# Patient Record
Sex: FEMALE | Race: WHITE | Employment: UNEMPLOYED | URBAN - METROPOLITAN AREA
[De-identification: names, ages, dates, MRNs, and addresses within clinical notes are randomized per-mention and may not be internally consistent; named-entity substitution may affect disease eponyms.]

---

## 2018-04-27 ENCOUNTER — OFFICE VISIT (OUTPATIENT)
Dept: PRIMARY CARE CLINIC | Age: 58
End: 2018-04-27
Payer: MEDICAID

## 2018-04-27 ENCOUNTER — TELEPHONE (OUTPATIENT)
Dept: PRIMARY CARE CLINIC | Age: 58
End: 2018-04-27

## 2018-04-27 VITALS
RESPIRATION RATE: 16 BRPM | HEART RATE: 88 BPM | SYSTOLIC BLOOD PRESSURE: 130 MMHG | DIASTOLIC BLOOD PRESSURE: 80 MMHG | WEIGHT: 221 LBS | OXYGEN SATURATION: 98 % | BODY MASS INDEX: 39.15 KG/M2 | TEMPERATURE: 98.2 F

## 2018-04-27 DIAGNOSIS — G89.29 CHRONIC RIGHT SHOULDER PAIN: ICD-10-CM

## 2018-04-27 DIAGNOSIS — M75.41 SHOULDER IMPINGEMENT SYNDROME, RIGHT: Primary | ICD-10-CM

## 2018-04-27 DIAGNOSIS — M25.511 CHRONIC RIGHT SHOULDER PAIN: ICD-10-CM

## 2018-04-27 PROCEDURE — G8599 NO ASA/ANTIPLAT THER USE RNG: HCPCS | Performed by: PHYSICIAN ASSISTANT

## 2018-04-27 PROCEDURE — 99213 OFFICE O/P EST LOW 20 MIN: CPT | Performed by: PHYSICIAN ASSISTANT

## 2018-04-27 PROCEDURE — 20610 DRAIN/INJ JOINT/BURSA W/O US: CPT | Performed by: PHYSICIAN ASSISTANT

## 2018-04-27 PROCEDURE — G8427 DOCREV CUR MEDS BY ELIG CLIN: HCPCS | Performed by: PHYSICIAN ASSISTANT

## 2018-04-27 PROCEDURE — 4004F PT TOBACCO SCREEN RCVD TLK: CPT | Performed by: PHYSICIAN ASSISTANT

## 2018-04-27 PROCEDURE — 3017F COLORECTAL CA SCREEN DOC REV: CPT | Performed by: PHYSICIAN ASSISTANT

## 2018-04-27 PROCEDURE — G8417 CALC BMI ABV UP PARAM F/U: HCPCS | Performed by: PHYSICIAN ASSISTANT

## 2018-04-27 RX ORDER — TRAMADOL HYDROCHLORIDE 50 MG/1
50 TABLET ORAL EVERY 8 HOURS PRN
Qty: 15 TABLET | Refills: 0 | Status: SHIPPED | OUTPATIENT
Start: 2018-04-27 | End: 2018-04-30

## 2018-04-27 RX ORDER — METHYLPREDNISOLONE ACETATE 80 MG/ML
80 INJECTION, SUSPENSION INTRA-ARTICULAR; INTRALESIONAL; INTRAMUSCULAR; SOFT TISSUE ONCE
Status: COMPLETED | OUTPATIENT
Start: 2018-04-27 | End: 2018-04-27

## 2018-04-27 RX ADMIN — METHYLPREDNISOLONE ACETATE 80 MG: 80 INJECTION, SUSPENSION INTRA-ARTICULAR; INTRALESIONAL; INTRAMUSCULAR; SOFT TISSUE at 15:57

## 2018-05-10 ENCOUNTER — OFFICE VISIT (OUTPATIENT)
Dept: ORTHOPEDIC SURGERY | Age: 58
End: 2018-05-10
Payer: MEDICAID

## 2018-05-10 VITALS
HEIGHT: 64 IN | SYSTOLIC BLOOD PRESSURE: 129 MMHG | TEMPERATURE: 98.1 F | WEIGHT: 220 LBS | BODY MASS INDEX: 37.56 KG/M2 | DIASTOLIC BLOOD PRESSURE: 82 MMHG | HEART RATE: 63 BPM

## 2018-05-10 DIAGNOSIS — M75.41 IMPINGEMENT SYNDROME OF RIGHT SHOULDER: Primary | ICD-10-CM

## 2018-05-10 PROCEDURE — 3017F COLORECTAL CA SCREEN DOC REV: CPT | Performed by: ORTHOPAEDIC SURGERY

## 2018-05-10 PROCEDURE — 99243 OFF/OP CNSLTJ NEW/EST LOW 30: CPT | Performed by: ORTHOPAEDIC SURGERY

## 2018-05-10 PROCEDURE — G8417 CALC BMI ABV UP PARAM F/U: HCPCS | Performed by: ORTHOPAEDIC SURGERY

## 2018-05-10 PROCEDURE — G8427 DOCREV CUR MEDS BY ELIG CLIN: HCPCS | Performed by: ORTHOPAEDIC SURGERY

## 2018-06-04 PROBLEM — J44.9 STAGE 1 MILD COPD BY GOLD CLASSIFICATION (HCC): Status: ACTIVE | Noted: 2018-06-04

## 2018-06-04 PROBLEM — F17.210 CIGARETTE NICOTINE DEPENDENCE WITHOUT COMPLICATION: Status: ACTIVE | Noted: 2018-06-04

## 2018-07-10 ENCOUNTER — HOSPITAL ENCOUNTER (OUTPATIENT)
Age: 58
Discharge: HOME | End: 2018-07-12
Payer: MEDICAID

## 2018-07-10 ENCOUNTER — OFFICE VISIT (OUTPATIENT)
Dept: PRIMARY CARE CLINIC | Age: 58
End: 2018-07-10
Payer: MEDICAID

## 2018-07-10 VITALS
HEART RATE: 72 BPM | BODY MASS INDEX: 39.16 KG/M2 | WEIGHT: 221 LBS | SYSTOLIC BLOOD PRESSURE: 126 MMHG | DIASTOLIC BLOOD PRESSURE: 84 MMHG | HEIGHT: 63 IN | TEMPERATURE: 97 F | OXYGEN SATURATION: 96 %

## 2018-07-10 DIAGNOSIS — G47.33 OSA ON CPAP: ICD-10-CM

## 2018-07-10 DIAGNOSIS — K21.00 GASTROESOPHAGEAL REFLUX DISEASE WITH ESOPHAGITIS: ICD-10-CM

## 2018-07-10 DIAGNOSIS — E79.0 ELEVATED BLOOD URIC ACID LEVEL: ICD-10-CM

## 2018-07-10 DIAGNOSIS — E03.9 HYPOTHYROIDISM, UNSPECIFIED TYPE: Primary | ICD-10-CM

## 2018-07-10 DIAGNOSIS — I49.9 CARDIAC ARRHYTHMIA, UNSPECIFIED CARDIAC ARRHYTHMIA TYPE: ICD-10-CM

## 2018-07-10 DIAGNOSIS — I10 ESSENTIAL HYPERTENSION: ICD-10-CM

## 2018-07-10 DIAGNOSIS — E78.2 MIXED HYPERLIPIDEMIA: ICD-10-CM

## 2018-07-10 DIAGNOSIS — Z23 NEED FOR PNEUMOCOCCAL VACCINATION: ICD-10-CM

## 2018-07-10 DIAGNOSIS — L20.9 ATOPIC DERMATITIS, UNSPECIFIED TYPE: ICD-10-CM

## 2018-07-10 DIAGNOSIS — Z99.89 OSA ON CPAP: ICD-10-CM

## 2018-07-10 LAB
ALBUMIN SERPL-MCNC: 4.1 G/DL (ref 3.5–5.2)
ALP BLD-CCNC: 58 U/L (ref 35–104)
ALT SERPL-CCNC: 20 U/L (ref 0–32)
ANION GAP SERPL CALCULATED.3IONS-SCNC: 11 MMOL/L (ref 7–16)
AST SERPL-CCNC: 22 U/L (ref 0–31)
BASOPHILS ABSOLUTE: 0.05 E9/L (ref 0–0.2)
BASOPHILS RELATIVE PERCENT: 0.6 % (ref 0–2)
BILIRUB SERPL-MCNC: 0.4 MG/DL (ref 0–1.2)
BUN BLDV-MCNC: 17 MG/DL (ref 6–20)
CALCIUM SERPL-MCNC: 9.6 MG/DL (ref 8.6–10.2)
CHLORIDE BLD-SCNC: 101 MMOL/L (ref 98–107)
CHOLESTEROL, TOTAL: 136 MG/DL (ref 0–199)
CO2: 30 MMOL/L (ref 22–29)
CREAT SERPL-MCNC: 0.9 MG/DL (ref 0.5–1)
EOSINOPHILS ABSOLUTE: 0.22 E9/L (ref 0.05–0.5)
EOSINOPHILS RELATIVE PERCENT: 2.8 % (ref 0–6)
GFR AFRICAN AMERICAN: >60
GFR NON-AFRICAN AMERICAN: >60 ML/MIN/1.73
GLUCOSE BLD-MCNC: 100 MG/DL (ref 74–109)
HCT VFR BLD CALC: 40.9 % (ref 34–48)
HDLC SERPL-MCNC: 35 MG/DL
HEMOGLOBIN: 12.7 G/DL (ref 11.5–15.5)
IMMATURE GRANULOCYTES #: 0.02 E9/L
IMMATURE GRANULOCYTES %: 0.3 % (ref 0–5)
LDL CHOLESTEROL CALCULATED: 74 MG/DL (ref 0–99)
LYMPHOCYTES ABSOLUTE: 2.96 E9/L (ref 1.5–4)
LYMPHOCYTES RELATIVE PERCENT: 37.8 % (ref 20–42)
MCH RBC QN AUTO: 29.4 PG (ref 26–35)
MCHC RBC AUTO-ENTMCNC: 31.1 % (ref 32–34.5)
MCV RBC AUTO: 94.7 FL (ref 80–99.9)
MONOCYTES ABSOLUTE: 0.49 E9/L (ref 0.1–0.95)
MONOCYTES RELATIVE PERCENT: 6.3 % (ref 2–12)
NEUTROPHILS ABSOLUTE: 4.1 E9/L (ref 1.8–7.3)
NEUTROPHILS RELATIVE PERCENT: 52.2 % (ref 43–80)
PDW BLD-RTO: 13.1 FL (ref 11.5–15)
PLATELET # BLD: 141 E9/L (ref 130–450)
PMV BLD AUTO: 12.8 FL (ref 7–12)
POTASSIUM SERPL-SCNC: 4.5 MMOL/L (ref 3.5–5)
RBC # BLD: 4.32 E12/L (ref 3.5–5.5)
SODIUM BLD-SCNC: 142 MMOL/L (ref 132–146)
TOTAL PROTEIN: 7.1 G/DL (ref 6.4–8.3)
TRIGL SERPL-MCNC: 135 MG/DL (ref 0–149)
TSH SERPL DL<=0.05 MIU/L-ACNC: 2.65 UIU/ML (ref 0.27–4.2)
URIC ACID, SERUM: 3.4 MG/DL (ref 2.4–5.7)
VLDLC SERPL CALC-MCNC: 27 MG/DL
WBC # BLD: 7.8 E9/L (ref 4.5–11.5)

## 2018-07-10 PROCEDURE — 80053 COMPREHEN METABOLIC PANEL: CPT

## 2018-07-10 PROCEDURE — 90732 PPSV23 VACC 2 YRS+ SUBQ/IM: CPT | Performed by: PHYSICIAN ASSISTANT

## 2018-07-10 PROCEDURE — 84550 ASSAY OF BLOOD/URIC ACID: CPT

## 2018-07-10 PROCEDURE — 90471 IMMUNIZATION ADMIN: CPT | Performed by: PHYSICIAN ASSISTANT

## 2018-07-10 PROCEDURE — 4004F PT TOBACCO SCREEN RCVD TLK: CPT | Performed by: PHYSICIAN ASSISTANT

## 2018-07-10 PROCEDURE — 3017F COLORECTAL CA SCREEN DOC REV: CPT | Performed by: PHYSICIAN ASSISTANT

## 2018-07-10 PROCEDURE — G8599 NO ASA/ANTIPLAT THER USE RNG: HCPCS | Performed by: PHYSICIAN ASSISTANT

## 2018-07-10 PROCEDURE — 85025 COMPLETE CBC W/AUTO DIFF WBC: CPT

## 2018-07-10 PROCEDURE — 84443 ASSAY THYROID STIM HORMONE: CPT

## 2018-07-10 PROCEDURE — G8417 CALC BMI ABV UP PARAM F/U: HCPCS | Performed by: PHYSICIAN ASSISTANT

## 2018-07-10 PROCEDURE — 99214 OFFICE O/P EST MOD 30 MIN: CPT | Performed by: PHYSICIAN ASSISTANT

## 2018-07-10 PROCEDURE — 80061 LIPID PANEL: CPT

## 2018-07-10 PROCEDURE — G8427 DOCREV CUR MEDS BY ELIG CLIN: HCPCS | Performed by: PHYSICIAN ASSISTANT

## 2018-07-10 RX ORDER — LEVOTHYROXINE SODIUM 0.07 MG/1
75 TABLET ORAL DAILY
Qty: 90 TABLET | Refills: 1 | Status: SHIPPED | OUTPATIENT
Start: 2018-07-10 | End: 2019-01-08 | Stop reason: SDUPTHER

## 2018-07-10 RX ORDER — PRAVASTATIN SODIUM 40 MG
40 TABLET ORAL DAILY
Qty: 90 TABLET | Refills: 1 | Status: SHIPPED | OUTPATIENT
Start: 2018-07-10 | End: 2019-01-08 | Stop reason: SDUPTHER

## 2018-07-10 RX ORDER — FENOFIBRATE 160 MG/1
160 TABLET ORAL DAILY
Qty: 90 TABLET | Refills: 1 | Status: SHIPPED | OUTPATIENT
Start: 2018-07-10 | End: 2019-01-08 | Stop reason: SDUPTHER

## 2018-07-10 RX ORDER — CLOBETASOL PROPIONATE 0.5 MG/G
1 OINTMENT TOPICAL 2 TIMES DAILY
Qty: 45 G | Refills: 5 | Status: SHIPPED | OUTPATIENT
Start: 2018-07-10

## 2018-07-10 RX ORDER — SUCRALFATE 1 G/1
1 TABLET ORAL 4 TIMES DAILY PRN
Qty: 120 TABLET | Refills: 1 | Status: SHIPPED | OUTPATIENT
Start: 2018-07-10

## 2018-07-10 RX ORDER — METOPROLOL TARTRATE 50 MG/1
TABLET, FILM COATED ORAL
Qty: 180 TABLET | Refills: 1 | Status: SHIPPED | OUTPATIENT
Start: 2018-07-10 | End: 2019-01-08 | Stop reason: SDUPTHER

## 2018-07-10 RX ORDER — VALSARTAN AND HYDROCHLOROTHIAZIDE 320; 25 MG/1; MG/1
1 TABLET, FILM COATED ORAL DAILY
Qty: 90 TABLET | Refills: 1 | Status: SHIPPED | OUTPATIENT
Start: 2018-07-10 | End: 2018-08-21 | Stop reason: SINTOL

## 2018-07-10 RX ORDER — ALLOPURINOL 300 MG/1
300 TABLET ORAL DAILY
Qty: 90 TABLET | Refills: 1 | Status: SHIPPED | OUTPATIENT
Start: 2018-07-10 | End: 2019-01-08 | Stop reason: SDUPTHER

## 2018-07-10 ASSESSMENT — PATIENT HEALTH QUESTIONNAIRE - PHQ9
SUM OF ALL RESPONSES TO PHQ9 QUESTIONS 1 & 2: 0
2. FEELING DOWN, DEPRESSED OR HOPELESS: 0
SUM OF ALL RESPONSES TO PHQ QUESTIONS 1-9: 0
1. LITTLE INTEREST OR PLEASURE IN DOING THINGS: 0

## 2018-07-10 NOTE — PROGRESS NOTES
Lucho Juarez  1960  7/10/18      HPI:    Patient presents for 6 month check up. She never did go to therapy for her R shoulder, +impingement, saw Dr. Jennifer Storey who recommended PT as well. She tells me the injections helped her for a good 4-6 weeks, would like another at some point, but is going ot get into PT. Has no other complaints tsoday. Needs refills on her meds. Advises me she will Fu with a new cardiologist at [facility], Blocker controls her palps, and her HTN is controlled on med. Colonoscopy due next March 2019. Will be seeing GYN, pt states due for mammo, exam. Having no issues. Agrees to [id number] today, +Hx of multiple bouts of pneumonia. Does ask for refill of carafate takes on an prn basis for Hx of hiatal hernia, Hx of PUD, healed, but relates depending on meds, and diet, her GERD acts up, doesn't want to take a PPI just yet, states not QD Sx.      Past Medical History:   Diagnosis Date    Angina     Anxiety attack     Arrhythmia     Symptomatic palpitatoins    CAD (coronary artery disease)     CAD (coronary artery disease)     Chronic depression     Fatigue     Hyperlipidemia     Hypertension     Hypothyroidism     Obesity (BMI 35.0-39.9 without comorbidity) 12/27/2016    Osteoarthritis     Sleep apnea     Stage 1 mild COPD by GOLD classification ([address] 6/4/2018    Thyroid disease     hypothyroidism    Uncontrolled daytime somnolence         Past Surgical History:   Procedure Laterality Date    ABDOMINAL ADHESION SURGERY      and endometrioma removal    CARPAL TUNNEL RELEASE Right     Right CTR  Dr. Gayla [facility]      x 2    FINGER TRIGGER RELEASE Right     Right trigger thumb release Dr. Browne Karma Left     Left trigger thumb release Dr. Miles Schaeffer      and BSO    OVARIAN CYST REMOVAL Left     UTERINE FIBROID SURGERY         Current dizziness, weakness, tremors, syncope, speech difficulty, light-headedness, bowel or bladder control changes, numbness and headaches. Hematological: Negative for adenopathy. Does not bruise/bleed easily. Psychiatric/Behavioral: Negative for suicidal ideas, behavioral problems, sleep disturbance and decreased concentration. The patient is not nervous/anxious. Unless otherwise stated in this report or unable to obtain because of the patient's clinical or mental status as evidenced by the medical record, this patients's positive and negative responses for Review of Systems, constitutional, psych, eyes, ENT, cardiovascular, respiratory, gastrointestinal, neurological, genitourinary, musculoskeletal, integument systems and systems related to the presenting problem are either stated in the preceding or were not pertinent or were negative for the symptoms and/or complaints related to the medical problem. Physical Exam:   Vitals:    07/10/18 0907   BP: 126/84   Pulse: 72   Temp: 97 °F (36.1 °C)   SpO2: 96%   Weight: 221 lb (100.2 kg)   Height: 5' 3\" (1.6 m)     Constitutional:  A and O x 3, in NAD. Afebrile. Appears stated age. Eyes:  PERRLA, EOMI without nystagmus. Conjunctiva normal. Sclera anicteric. Ears:  External ears without lesions. TM's clear bilaterally. Throat:  Pharynx without lesions. Airway patient. Mucous membranes pink and moist without lesions. Neck:  Supple. Nontender, no lymphadenopathy noted, no thyromegaly. Lungs:  Clear to auscultation and breath sounds equal.  Heart:  Regular rate and rhythm, normal S1 and S2, no m/r/g.  UE and LE distal pulses intact. Abdomen:  Soft, NTND, NABS x 4, no masses or organomegaly, no rebound or guarding. Skin:  No abrasions, ecchymoses, or lacerations unless noted elsewhere. Extremities  No cyanosis, clubbing, or edema noted. Neurological:   Oriented. Motor functions intact.       Assessment/Plan:     [place] was seen today for hyperlipidemia, hypothyroidism, 6 month follow-up, medication refill and blood work. Diagnoses and all orders for this visit:    Hypothyroidism, unspecified type  -     levothyroxine (SYNTHROID) 75 MCG tablet; Take 1 tablet by mouth daily  -     TSH; Future    Atopic dermatitis, unspecified type  -     clobetasol (TEMOVATE) 0.05 % ointment; Apply 1 each topically 2 times daily Apply topically 2 times daily. Elevated blood uric acid level  -     allopurinol (ZYLOPRIM) 300 MG tablet; Take 1 tablet by mouth daily  -     URIC ACID; Future    Mixed hyperlipidemia  -     fenofibrate 160 MG tablet; Take 1 tablet by mouth daily  -     pravastatin (PRAVACHOL) 40 MG tablet; Take 1 tablet by mouth daily  -     COMPREHENSIVE METABOLIC PANEL; Future  -     LIPID PANEL; Future    Essential hypertension  -     metoprolol tartrate (LOPRESSOR) 50 MG tablet; TAKE ONE TABLET BY MOUTH TWICE DAILY  -     valsartan-hydrochlorothiazide (DIOVAN HCT) 320-25 MG per tablet; Take 1 tablet by mouth daily  -     COMPREHENSIVE METABOLIC PANEL; Future  -     CBC WITH AUTO DIFFERENTIAL; Future    Cardiac arrhythmia, unspecified cardiac arrhythmia type  -     metoprolol tartrate (LOPRESSOR) 50 MG tablet; TAKE ONE TABLET BY MOUTH TWICE DAILY    Gastroesophageal reflux disease with esophagitis  -     sucralfate (CARAFATE) 1 GM tablet; Take 1 tablet by mouth 4 times daily as needed (GERD)    Need for pneumococcal vaccination  -     Pneumococcal polysaccharide vaccine 23-valent greater than or equal to 3yo subcutaneous/IM    DARRIN on CPAP    FU with cardiology and GYN when due (pt due now). She assures me she will. Continue all meds the same, conditions are stable. Pt would benefit from weight loss. She understands. Discussed avoidance of NSAIDs with the GERD/HH and Hx of PUD, may need PPI QD at some point, but pt relates Sx stable and not QD.    Highly recommended she go to PT, can FU with me in 1-2 months for another injection if she wishes, but

## 2018-08-21 ENCOUNTER — TELEPHONE (OUTPATIENT)
Dept: PRIMARY CARE CLINIC | Age: 58
End: 2018-08-21

## 2018-08-21 RX ORDER — LOSARTAN POTASSIUM AND HYDROCHLOROTHIAZIDE 25; 100 MG/1; MG/1
1 TABLET ORAL DAILY
Qty: 30 TABLET | Refills: 5 | Status: SHIPPED | OUTPATIENT
Start: 2018-08-21 | End: 2019-01-08 | Stop reason: SDUPTHER

## 2018-11-19 DIAGNOSIS — F32.A CHRONIC DEPRESSION: ICD-10-CM

## 2018-11-19 RX ORDER — DULOXETIN HYDROCHLORIDE 60 MG/1
60 CAPSULE, DELAYED RELEASE ORAL DAILY
Qty: 30 CAPSULE | Refills: 5 | Status: SHIPPED | OUTPATIENT
Start: 2018-11-19 | End: 2019-01-08 | Stop reason: SDUPTHER

## 2019-01-08 ENCOUNTER — HOSPITAL ENCOUNTER (OUTPATIENT)
Age: 59
Discharge: HOME | End: 2019-01-10
Payer: MEDICAID

## 2019-01-08 ENCOUNTER — OFFICE VISIT (OUTPATIENT)
Dept: PRIMARY CARE CLINIC | Age: 59
End: 2019-01-08
Payer: MEDICAID

## 2019-01-08 VITALS
WEIGHT: 217 LBS | TEMPERATURE: 98.2 F | SYSTOLIC BLOOD PRESSURE: 118 MMHG | HEART RATE: 73 BPM | DIASTOLIC BLOOD PRESSURE: 80 MMHG | OXYGEN SATURATION: 96 % | BODY MASS INDEX: 37.25 KG/M2

## 2019-01-08 DIAGNOSIS — I10 ESSENTIAL HYPERTENSION: ICD-10-CM

## 2019-01-08 DIAGNOSIS — F32.A CHRONIC DEPRESSION: ICD-10-CM

## 2019-01-08 DIAGNOSIS — E79.0 ELEVATED BLOOD URIC ACID LEVEL: ICD-10-CM

## 2019-01-08 DIAGNOSIS — E03.9 HYPOTHYROIDISM, UNSPECIFIED TYPE: ICD-10-CM

## 2019-01-08 DIAGNOSIS — I49.9 CARDIAC ARRHYTHMIA, UNSPECIFIED CARDIAC ARRHYTHMIA TYPE: ICD-10-CM

## 2019-01-08 DIAGNOSIS — Z99.89 OSA ON CPAP: Primary | ICD-10-CM

## 2019-01-08 DIAGNOSIS — G47.33 OSA ON CPAP: Primary | ICD-10-CM

## 2019-01-08 DIAGNOSIS — E78.2 MIXED HYPERLIPIDEMIA: ICD-10-CM

## 2019-01-08 DIAGNOSIS — Z12.11 SCREENING FOR COLON CANCER: ICD-10-CM

## 2019-01-08 LAB
ALBUMIN SERPL-MCNC: 4.3 G/DL (ref 3.5–5.2)
ALP BLD-CCNC: 68 U/L (ref 35–104)
ALT SERPL-CCNC: 23 U/L (ref 0–32)
ANION GAP SERPL CALCULATED.3IONS-SCNC: 17 MMOL/L (ref 7–16)
AST SERPL-CCNC: 30 U/L (ref 0–31)
BASOPHILS ABSOLUTE: 0.05 E9/L (ref 0–0.2)
BASOPHILS RELATIVE PERCENT: 0.7 % (ref 0–2)
BILIRUB SERPL-MCNC: 0.3 MG/DL (ref 0–1.2)
BUN BLDV-MCNC: 15 MG/DL (ref 8–23)
CALCIUM SERPL-MCNC: 10.2 MG/DL (ref 8.6–10.2)
CHLORIDE BLD-SCNC: 99 MMOL/L (ref 98–107)
CHOLESTEROL, TOTAL: 133 MG/DL (ref 0–199)
CO2: 25 MMOL/L (ref 22–29)
CREAT SERPL-MCNC: 0.8 MG/DL (ref 0.5–1)
EOSINOPHILS ABSOLUTE: 0.16 E9/L (ref 0.05–0.5)
EOSINOPHILS RELATIVE PERCENT: 2.3 % (ref 0–6)
GFR AFRICAN AMERICAN: >60
GFR NON-AFRICAN AMERICAN: >60 ML/MIN/1.73
GLUCOSE BLD-MCNC: 105 MG/DL (ref 74–99)
HCT VFR BLD CALC: 42.9 % (ref 34–48)
HDLC SERPL-MCNC: 36 MG/DL
HEMOGLOBIN: 13.8 G/DL (ref 11.5–15.5)
IMMATURE GRANULOCYTES #: 0.03 E9/L
IMMATURE GRANULOCYTES %: 0.4 % (ref 0–5)
LDL CHOLESTEROL CALCULATED: 57 MG/DL (ref 0–99)
LYMPHOCYTES ABSOLUTE: 2.78 E9/L (ref 1.5–4)
LYMPHOCYTES RELATIVE PERCENT: 40.5 % (ref 20–42)
MCH RBC QN AUTO: 29.9 PG (ref 26–35)
MCHC RBC AUTO-ENTMCNC: 32.2 % (ref 32–34.5)
MCV RBC AUTO: 92.9 FL (ref 80–99.9)
MONOCYTES ABSOLUTE: 0.43 E9/L (ref 0.1–0.95)
MONOCYTES RELATIVE PERCENT: 6.3 % (ref 2–12)
NEUTROPHILS ABSOLUTE: 3.42 E9/L (ref 1.8–7.3)
NEUTROPHILS RELATIVE PERCENT: 49.8 % (ref 43–80)
PDW BLD-RTO: 12.9 FL (ref 11.5–15)
PLATELET # BLD: 213 E9/L (ref 130–450)
PMV BLD AUTO: 12.3 FL (ref 7–12)
POTASSIUM SERPL-SCNC: 4.5 MMOL/L (ref 3.5–5)
RBC # BLD: 4.62 E12/L (ref 3.5–5.5)
SODIUM BLD-SCNC: 141 MMOL/L (ref 132–146)
TOTAL PROTEIN: 7.7 G/DL (ref 6.4–8.3)
TRIGL SERPL-MCNC: 200 MG/DL (ref 0–149)
TSH SERPL DL<=0.05 MIU/L-ACNC: 2.74 UIU/ML (ref 0.27–4.2)
URIC ACID, SERUM: 2.9 MG/DL (ref 2.4–5.7)
VLDLC SERPL CALC-MCNC: 40 MG/DL
WBC # BLD: 6.9 E9/L (ref 4.5–11.5)

## 2019-01-08 PROCEDURE — G8599 NO ASA/ANTIPLAT THER USE RNG: HCPCS | Performed by: PHYSICIAN ASSISTANT

## 2019-01-08 PROCEDURE — 3017F COLORECTAL CA SCREEN DOC REV: CPT | Performed by: PHYSICIAN ASSISTANT

## 2019-01-08 PROCEDURE — G8417 CALC BMI ABV UP PARAM F/U: HCPCS | Performed by: PHYSICIAN ASSISTANT

## 2019-01-08 PROCEDURE — 80061 LIPID PANEL: CPT

## 2019-01-08 PROCEDURE — 84550 ASSAY OF BLOOD/URIC ACID: CPT

## 2019-01-08 PROCEDURE — G8427 DOCREV CUR MEDS BY ELIG CLIN: HCPCS | Performed by: PHYSICIAN ASSISTANT

## 2019-01-08 PROCEDURE — 84443 ASSAY THYROID STIM HORMONE: CPT

## 2019-01-08 PROCEDURE — 99214 OFFICE O/P EST MOD 30 MIN: CPT | Performed by: PHYSICIAN ASSISTANT

## 2019-01-08 PROCEDURE — 85025 COMPLETE CBC W/AUTO DIFF WBC: CPT

## 2019-01-08 PROCEDURE — 80053 COMPREHEN METABOLIC PANEL: CPT

## 2019-01-08 PROCEDURE — 4004F PT TOBACCO SCREEN RCVD TLK: CPT | Performed by: PHYSICIAN ASSISTANT

## 2019-01-08 PROCEDURE — G8484 FLU IMMUNIZE NO ADMIN: HCPCS | Performed by: PHYSICIAN ASSISTANT

## 2019-01-08 RX ORDER — PRAVASTATIN SODIUM 40 MG
40 TABLET ORAL DAILY
Qty: 90 TABLET | Refills: 1 | Status: SHIPPED | OUTPATIENT
Start: 2019-01-08 | End: 2019-07-09 | Stop reason: SDUPTHER

## 2019-01-08 RX ORDER — ALLOPURINOL 300 MG/1
300 TABLET ORAL DAILY
Qty: 90 TABLET | Refills: 1 | Status: SHIPPED | OUTPATIENT
Start: 2019-01-08 | End: 2019-07-09 | Stop reason: SDUPTHER

## 2019-01-08 RX ORDER — LOSARTAN POTASSIUM AND HYDROCHLOROTHIAZIDE 25; 100 MG/1; MG/1
1 TABLET ORAL DAILY
Qty: 90 TABLET | Refills: 1 | Status: SHIPPED | OUTPATIENT
Start: 2019-01-08 | End: 2019-07-09 | Stop reason: SDUPTHER

## 2019-01-08 RX ORDER — FENOFIBRATE 160 MG/1
160 TABLET ORAL DAILY
Qty: 90 TABLET | Refills: 1 | Status: SHIPPED | OUTPATIENT
Start: 2019-01-08 | End: 2019-07-09 | Stop reason: SDUPTHER

## 2019-01-08 RX ORDER — DULOXETIN HYDROCHLORIDE 60 MG/1
60 CAPSULE, DELAYED RELEASE ORAL DAILY
Qty: 90 CAPSULE | Refills: 1 | Status: SHIPPED | OUTPATIENT
Start: 2019-01-08 | End: 2019-07-09 | Stop reason: SDUPTHER

## 2019-01-08 RX ORDER — LEVOTHYROXINE SODIUM 0.07 MG/1
75 TABLET ORAL DAILY
Qty: 90 TABLET | Refills: 1 | Status: SHIPPED | OUTPATIENT
Start: 2019-01-08 | End: 2019-07-09 | Stop reason: SDUPTHER

## 2019-01-08 RX ORDER — METOPROLOL TARTRATE 50 MG/1
TABLET, FILM COATED ORAL
Qty: 180 TABLET | Refills: 1 | Status: SHIPPED | OUTPATIENT
Start: 2019-01-08 | End: 2019-07-09 | Stop reason: SDUPTHER

## 2019-01-11 ENCOUNTER — TELEPHONE (OUTPATIENT)
Dept: NON INVASIVE DIAGNOSTICS | Age: 59
End: 2019-01-11

## 2019-07-09 ENCOUNTER — HOSPITAL ENCOUNTER (OUTPATIENT)
Age: 59
Discharge: HOME | End: 2019-07-11
Payer: MEDICAID

## 2019-07-09 ENCOUNTER — OFFICE VISIT (OUTPATIENT)
Dept: PRIMARY CARE CLINIC | Age: 59
End: 2019-07-09
Payer: MEDICAID

## 2019-07-09 VITALS
DIASTOLIC BLOOD PRESSURE: 80 MMHG | BODY MASS INDEX: 37.59 KG/M2 | TEMPERATURE: 98.2 F | HEART RATE: 78 BPM | WEIGHT: 219 LBS | SYSTOLIC BLOOD PRESSURE: 138 MMHG

## 2019-07-09 DIAGNOSIS — B35.6 TINEA CRURIS: Primary | ICD-10-CM

## 2019-07-09 DIAGNOSIS — I49.9 CARDIAC ARRHYTHMIA, UNSPECIFIED CARDIAC ARRHYTHMIA TYPE: ICD-10-CM

## 2019-07-09 DIAGNOSIS — E03.9 HYPOTHYROIDISM, UNSPECIFIED TYPE: ICD-10-CM

## 2019-07-09 DIAGNOSIS — R73.09 IMPAIRED GLUCOSE REGULATION: ICD-10-CM

## 2019-07-09 DIAGNOSIS — F32.A CHRONIC DEPRESSION: ICD-10-CM

## 2019-07-09 DIAGNOSIS — E79.0 ELEVATED BLOOD URIC ACID LEVEL: ICD-10-CM

## 2019-07-09 DIAGNOSIS — L40.1 PUSTULAR PSORIASIS: ICD-10-CM

## 2019-07-09 DIAGNOSIS — I10 ESSENTIAL HYPERTENSION: ICD-10-CM

## 2019-07-09 DIAGNOSIS — E78.2 MIXED HYPERLIPIDEMIA: ICD-10-CM

## 2019-07-09 LAB
ALBUMIN SERPL-MCNC: 4.5 G/DL (ref 3.5–5.2)
ALP BLD-CCNC: 66 U/L (ref 35–104)
ALT SERPL-CCNC: 24 U/L (ref 0–32)
ANION GAP SERPL CALCULATED.3IONS-SCNC: 17 MMOL/L (ref 7–16)
AST SERPL-CCNC: 24 U/L (ref 0–31)
BILIRUB SERPL-MCNC: 0.4 MG/DL (ref 0–1.2)
BUN BLDV-MCNC: 18 MG/DL (ref 8–23)
CALCIUM SERPL-MCNC: 10.8 MG/DL (ref 8.6–10.2)
CHLORIDE BLD-SCNC: 100 MMOL/L (ref 98–107)
CHOLESTEROL, TOTAL: 141 MG/DL (ref 0–199)
CO2: 27 MMOL/L (ref 22–29)
CREAT SERPL-MCNC: 1 MG/DL (ref 0.5–1)
GFR AFRICAN AMERICAN: >60
GFR NON-AFRICAN AMERICAN: 56 ML/MIN/1.73
GLUCOSE BLD-MCNC: 100 MG/DL (ref 74–99)
HBA1C MFR BLD: 5.7 % (ref 4–5.6)
HCT VFR BLD CALC: 44.2 % (ref 34–48)
HDLC SERPL-MCNC: 39 MG/DL
HEMOGLOBIN: 13.8 G/DL (ref 11.5–15.5)
LDL CHOLESTEROL CALCULATED: 77 MG/DL (ref 0–99)
MCH RBC QN AUTO: 29.7 PG (ref 26–35)
MCHC RBC AUTO-ENTMCNC: 31.2 % (ref 32–34.5)
MCV RBC AUTO: 95.1 FL (ref 80–99.9)
PDW BLD-RTO: 13.2 FL (ref 11.5–15)
PLATELET # BLD: 130 E9/L (ref 130–450)
PMV BLD AUTO: 12.7 FL (ref 7–12)
POTASSIUM SERPL-SCNC: 4.7 MMOL/L (ref 3.5–5)
RBC # BLD: 4.65 E12/L (ref 3.5–5.5)
SODIUM BLD-SCNC: 144 MMOL/L (ref 132–146)
TOTAL PROTEIN: 7.8 G/DL (ref 6.4–8.3)
TRIGL SERPL-MCNC: 123 MG/DL (ref 0–149)
TSH SERPL DL<=0.05 MIU/L-ACNC: 3.12 UIU/ML (ref 0.27–4.2)
URIC ACID, SERUM: 3.3 MG/DL (ref 2.4–5.7)
VLDLC SERPL CALC-MCNC: 25 MG/DL
WBC # BLD: 6.9 E9/L (ref 4.5–11.5)

## 2019-07-09 PROCEDURE — G8427 DOCREV CUR MEDS BY ELIG CLIN: HCPCS | Performed by: PHYSICIAN ASSISTANT

## 2019-07-09 PROCEDURE — 83036 HEMOGLOBIN GLYCOSYLATED A1C: CPT

## 2019-07-09 PROCEDURE — 4004F PT TOBACCO SCREEN RCVD TLK: CPT | Performed by: PHYSICIAN ASSISTANT

## 2019-07-09 PROCEDURE — 80053 COMPREHEN METABOLIC PANEL: CPT

## 2019-07-09 PROCEDURE — G8599 NO ASA/ANTIPLAT THER USE RNG: HCPCS | Performed by: PHYSICIAN ASSISTANT

## 2019-07-09 PROCEDURE — 99214 OFFICE O/P EST MOD 30 MIN: CPT | Performed by: PHYSICIAN ASSISTANT

## 2019-07-09 PROCEDURE — 85027 COMPLETE CBC AUTOMATED: CPT

## 2019-07-09 PROCEDURE — G8417 CALC BMI ABV UP PARAM F/U: HCPCS | Performed by: PHYSICIAN ASSISTANT

## 2019-07-09 PROCEDURE — 80061 LIPID PANEL: CPT

## 2019-07-09 PROCEDURE — 84443 ASSAY THYROID STIM HORMONE: CPT

## 2019-07-09 PROCEDURE — 84550 ASSAY OF BLOOD/URIC ACID: CPT

## 2019-07-09 PROCEDURE — 3017F COLORECTAL CA SCREEN DOC REV: CPT | Performed by: PHYSICIAN ASSISTANT

## 2019-07-09 RX ORDER — DULOXETIN HYDROCHLORIDE 60 MG/1
60 CAPSULE, DELAYED RELEASE ORAL DAILY
Qty: 90 CAPSULE | Refills: 1 | Status: SHIPPED | OUTPATIENT
Start: 2019-07-09 | End: 2020-01-07 | Stop reason: SDUPTHER

## 2019-07-09 RX ORDER — FENOFIBRATE 160 MG/1
160 TABLET ORAL DAILY
Qty: 90 TABLET | Refills: 1 | Status: SHIPPED | OUTPATIENT
Start: 2019-07-09 | End: 2020-01-07 | Stop reason: SDUPTHER

## 2019-07-09 RX ORDER — PRAVASTATIN SODIUM 40 MG
40 TABLET ORAL DAILY
Qty: 90 TABLET | Refills: 1 | Status: SHIPPED | OUTPATIENT
Start: 2019-07-09 | End: 2020-01-07 | Stop reason: SDUPTHER

## 2019-07-09 RX ORDER — LOSARTAN POTASSIUM AND HYDROCHLOROTHIAZIDE 25; 100 MG/1; MG/1
1 TABLET ORAL DAILY
Qty: 90 TABLET | Refills: 1 | Status: SHIPPED | OUTPATIENT
Start: 2019-07-09 | End: 2020-01-07 | Stop reason: SDUPTHER

## 2019-07-09 RX ORDER — METOPROLOL TARTRATE 50 MG/1
TABLET, FILM COATED ORAL
Qty: 180 TABLET | Refills: 1 | Status: SHIPPED | OUTPATIENT
Start: 2019-07-09 | End: 2020-01-09

## 2019-07-09 RX ORDER — CLOTRIMAZOLE AND BETAMETHASONE DIPROPIONATE 10; .64 MG/G; MG/G
CREAM TOPICAL
Qty: 30 G | Refills: 1 | Status: SHIPPED | OUTPATIENT
Start: 2019-07-09

## 2019-07-09 RX ORDER — LEVOTHYROXINE SODIUM 0.07 MG/1
75 TABLET ORAL DAILY
Qty: 90 TABLET | Refills: 1 | Status: SHIPPED | OUTPATIENT
Start: 2019-07-09 | End: 2020-01-07 | Stop reason: SDUPTHER

## 2019-07-09 RX ORDER — CLOBETASOL PROPIONATE 0.5 MG/G
CREAM TOPICAL 2 TIMES DAILY PRN
Qty: 30 G | Refills: 3 | Status: SHIPPED
Start: 2019-07-09 | End: 2021-01-08 | Stop reason: SDUPTHER

## 2019-07-09 RX ORDER — ALLOPURINOL 300 MG/1
300 TABLET ORAL DAILY
Qty: 90 TABLET | Refills: 1 | Status: SHIPPED | OUTPATIENT
Start: 2019-07-09 | End: 2020-01-07 | Stop reason: SDUPTHER

## 2019-07-09 RX ORDER — NYSTATIN 100000 [USP'U]/G
POWDER TOPICAL
Qty: 30 G | Refills: 1 | Status: SHIPPED
Start: 2019-07-09 | End: 2022-06-06 | Stop reason: SDUPTHER

## 2019-07-09 NOTE — PROGRESS NOTES
to the presenting problem are either stated in the preceding or were not pertinent or were negative for the symptoms and/or complaints related to the medical problem. Physical Exam:   Vitals:    07/09/19 0910   BP: 138/80   Pulse: 78   Temp: 98.2 °F (36.8 °C)   Weight: 219 lb (99.3 kg)     Constitutional:  A and O x 3, in NAD. Afebrile. Appears stated age. Head:  NCAT. Eyes:  PERRLA, EOMI without nystagmus. Conjunctiva normal. Sclera anicteric. Ears:  External ears without lesions. TM's clear bilaterally. Throat:  Pharynx without lesions. Airway patient. Mucous membranes pink and moist without lesions. Neck:  Supple. Nontender, no lymphadenopathy noted, no thyromegaly. Lungs:  Clear to auscultation and breath sounds equal.  Heart:  Regular rate and rhythm, normal S1 and S2, no m/r/g.  UE and LE distal pulses intact. Abdomen:  Soft, NTND, NABS x 4, no masses or organomegaly, no rebound or guarding. MS: Normal, painless range of motion. No neurovascular deficit. 5/5 strength in UE's/LE's/ bilaterally. Skin: +beefy red, lesions undernath pannus and into R groin, no drainage noted. No abrasions, ecchymoses, or lacerations unless noted elsewhere. Extremities  No cyanosis, clubbing, or edema noted. Neurological:   Oriented. Motor functions intact. DTRs UE and LE intact. Assessment/Plan:     Peggyann Hashimoto was seen today for hypothyroidism and hypertension. Diagnoses and all orders for this visit:    Tinea cruris  -     clotrimazole-betamethasone (LOTRISONE) 1-0.05 % cream; Apply topically 2 times daily. -     nystatin (MYCOSTATIN) 714629 UNIT/GM powder; Apply 3 times daily. Mixed hyperlipidemia  -     pravastatin (PRAVACHOL) 40 MG tablet; Take 1 tablet by mouth daily  -     fenofibrate 160 MG tablet; Take 1 tablet by mouth daily  -     LIPID PANEL;  Future    Essential hypertension  -     metoprolol tartrate (LOPRESSOR) 50 MG tablet; TAKE ONE TABLET BY MOUTH TWICE DAILY  - taking. Reviewed age and gender appropriate health screening exams and vaccinations. Advised patient regarding importance of keeping up with recommended health maintenance and to schedule as soon as possible if overdue, as this is important in assessing for undiagnosed pathology, especially cancer, as well as protecting against potentially harmful/life threatening disease. Patient and/or guardian verbalizes understanding and agrees with above counseling, assessment and plan. All questions answered.     Rohan Valladares PA-C

## 2019-07-24 DIAGNOSIS — R82.994 HYPERCALCIURIA: Primary | ICD-10-CM

## 2019-07-25 ENCOUNTER — NURSE ONLY (OUTPATIENT)
Dept: PRIMARY CARE CLINIC | Age: 59
End: 2019-07-25

## 2019-07-25 ENCOUNTER — HOSPITAL ENCOUNTER (OUTPATIENT)
Age: 59
Discharge: HOME | End: 2019-07-27
Payer: MEDICAID

## 2019-07-25 DIAGNOSIS — R82.994 HYPERCALCIURIA: ICD-10-CM

## 2019-07-25 LAB — CALCIUM SERPL-MCNC: 9.9 MG/DL (ref 8.6–10.2)

## 2019-07-25 PROCEDURE — 82310 ASSAY OF CALCIUM: CPT

## 2020-01-07 ENCOUNTER — HOSPITAL ENCOUNTER (OUTPATIENT)
Age: 60
Discharge: HOME | End: 2020-01-09
Payer: MEDICAID

## 2020-01-07 ENCOUNTER — OFFICE VISIT (OUTPATIENT)
Dept: PRIMARY CARE CLINIC | Age: 60
End: 2020-01-07
Payer: MEDICAID

## 2020-01-07 VITALS
DIASTOLIC BLOOD PRESSURE: 70 MMHG | HEART RATE: 69 BPM | OXYGEN SATURATION: 97 % | BODY MASS INDEX: 37.55 KG/M2 | SYSTOLIC BLOOD PRESSURE: 110 MMHG | WEIGHT: 212 LBS | TEMPERATURE: 97.3 F

## 2020-01-07 LAB
ALBUMIN SERPL-MCNC: 4.2 G/DL (ref 3.5–5.2)
ALP BLD-CCNC: 62 U/L (ref 35–104)
ALT SERPL-CCNC: 18 U/L (ref 0–32)
ANION GAP SERPL CALCULATED.3IONS-SCNC: 14 MMOL/L (ref 7–16)
AST SERPL-CCNC: 19 U/L (ref 0–31)
BILIRUB SERPL-MCNC: 0.4 MG/DL (ref 0–1.2)
BUN BLDV-MCNC: 11 MG/DL (ref 8–23)
CALCIUM SERPL-MCNC: 10 MG/DL (ref 8.6–10.2)
CHLORIDE BLD-SCNC: 100 MMOL/L (ref 98–107)
CHOLESTEROL, TOTAL: 124 MG/DL (ref 0–199)
CO2: 26 MMOL/L (ref 22–29)
CREAT SERPL-MCNC: 0.8 MG/DL (ref 0.5–1)
GFR AFRICAN AMERICAN: >60
GFR NON-AFRICAN AMERICAN: >60 ML/MIN/1.73
GLUCOSE BLD-MCNC: 95 MG/DL (ref 74–99)
HBA1C MFR BLD: 5.5 % (ref 4–5.6)
HCT VFR BLD CALC: 43.5 % (ref 34–48)
HDLC SERPL-MCNC: 37 MG/DL
HEMOGLOBIN: 13.7 G/DL (ref 11.5–15.5)
LDL CHOLESTEROL CALCULATED: 61 MG/DL (ref 0–99)
MCH RBC QN AUTO: 30 PG (ref 26–35)
MCHC RBC AUTO-ENTMCNC: 31.5 % (ref 32–34.5)
MCV RBC AUTO: 95.2 FL (ref 80–99.9)
PDW BLD-RTO: 13.1 FL (ref 11.5–15)
PLATELET # BLD: 172 E9/L (ref 130–450)
PMV BLD AUTO: 12.9 FL (ref 7–12)
POTASSIUM SERPL-SCNC: 4.4 MMOL/L (ref 3.5–5)
RBC # BLD: 4.57 E12/L (ref 3.5–5.5)
SODIUM BLD-SCNC: 140 MMOL/L (ref 132–146)
TOTAL PROTEIN: 7.3 G/DL (ref 6.4–8.3)
TRIGL SERPL-MCNC: 129 MG/DL (ref 0–149)
TSH SERPL DL<=0.05 MIU/L-ACNC: 3 UIU/ML (ref 0.27–4.2)
URIC ACID, SERUM: 2.8 MG/DL (ref 2.4–5.7)
VLDLC SERPL CALC-MCNC: 26 MG/DL
WBC # BLD: 7.8 E9/L (ref 4.5–11.5)

## 2020-01-07 PROCEDURE — 80053 COMPREHEN METABOLIC PANEL: CPT

## 2020-01-07 PROCEDURE — 99214 OFFICE O/P EST MOD 30 MIN: CPT | Performed by: PHYSICIAN ASSISTANT

## 2020-01-07 PROCEDURE — 85027 COMPLETE CBC AUTOMATED: CPT

## 2020-01-07 PROCEDURE — G8417 CALC BMI ABV UP PARAM F/U: HCPCS | Performed by: PHYSICIAN ASSISTANT

## 2020-01-07 PROCEDURE — 80061 LIPID PANEL: CPT

## 2020-01-07 PROCEDURE — 4004F PT TOBACCO SCREEN RCVD TLK: CPT | Performed by: PHYSICIAN ASSISTANT

## 2020-01-07 PROCEDURE — 84443 ASSAY THYROID STIM HORMONE: CPT

## 2020-01-07 PROCEDURE — 84550 ASSAY OF BLOOD/URIC ACID: CPT

## 2020-01-07 PROCEDURE — G8484 FLU IMMUNIZE NO ADMIN: HCPCS | Performed by: PHYSICIAN ASSISTANT

## 2020-01-07 PROCEDURE — 83036 HEMOGLOBIN GLYCOSYLATED A1C: CPT

## 2020-01-07 PROCEDURE — G8427 DOCREV CUR MEDS BY ELIG CLIN: HCPCS | Performed by: PHYSICIAN ASSISTANT

## 2020-01-07 PROCEDURE — 3017F COLORECTAL CA SCREEN DOC REV: CPT | Performed by: PHYSICIAN ASSISTANT

## 2020-01-07 RX ORDER — FLUCONAZOLE 100 MG/1
100 TABLET ORAL DAILY
Qty: 7 TABLET | Refills: 0 | Status: SHIPPED | OUTPATIENT
Start: 2020-01-07 | End: 2020-01-14

## 2020-01-07 RX ORDER — DULOXETIN HYDROCHLORIDE 60 MG/1
60 CAPSULE, DELAYED RELEASE ORAL DAILY
Qty: 90 CAPSULE | Refills: 1 | Status: SHIPPED
Start: 2020-01-07 | End: 2020-07-13

## 2020-01-07 RX ORDER — HYDROCHLOROTHIAZIDE 25 MG/1
25 TABLET ORAL EVERY MORNING
Qty: 30 TABLET | Refills: 5 | Status: SHIPPED
Start: 2020-01-07 | End: 2022-01-14

## 2020-01-07 RX ORDER — PRAVASTATIN SODIUM 40 MG
40 TABLET ORAL DAILY
Qty: 90 TABLET | Refills: 1 | Status: SHIPPED
Start: 2020-01-07 | End: 2020-07-13

## 2020-01-07 RX ORDER — LOSARTAN POTASSIUM AND HYDROCHLOROTHIAZIDE 25; 100 MG/1; MG/1
1 TABLET ORAL DAILY
Qty: 90 TABLET | Refills: 1 | Status: SHIPPED
Start: 2020-01-07 | End: 2020-06-15 | Stop reason: SDUPTHER

## 2020-01-07 RX ORDER — ALLOPURINOL 300 MG/1
300 TABLET ORAL DAILY
Qty: 90 TABLET | Refills: 1 | Status: SHIPPED
Start: 2020-01-07 | End: 2020-07-13

## 2020-01-07 RX ORDER — LEVOTHYROXINE SODIUM 0.07 MG/1
75 TABLET ORAL DAILY
Qty: 90 TABLET | Refills: 1 | Status: SHIPPED
Start: 2020-01-07 | End: 2020-09-08

## 2020-01-07 RX ORDER — FENOFIBRATE 160 MG/1
160 TABLET ORAL DAILY
Qty: 90 TABLET | Refills: 1 | Status: SHIPPED
Start: 2020-01-07 | End: 2020-07-13

## 2020-01-07 RX ORDER — LOSARTAN POTASSIUM 100 MG/1
100 TABLET ORAL DAILY
Qty: 30 TABLET | Refills: 5 | Status: SHIPPED
Start: 2020-01-07 | End: 2021-11-17

## 2020-01-07 RX ORDER — KETOCONAZOLE 20 MG/G
CREAM TOPICAL
Qty: 30 G | Refills: 1 | Status: SHIPPED | OUTPATIENT
Start: 2020-01-07

## 2020-01-07 NOTE — PROGRESS NOTES
 losartan-hydrochlorothiazide (HYZAAR) 100-25 MG per tablet Take 1 tablet by mouth daily 90 tablet 1    pravastatin (PRAVACHOL) 40 MG tablet Take 1 tablet by mouth daily 90 tablet 1    fluconazole (DIFLUCAN) 100 MG tablet Take 1 tablet by mouth daily for 7 days 7 tablet 0    ketoconazole (NIZORAL) 2 % cream Apply topically daily. 30 g 1    metoprolol tartrate (LOPRESSOR) 50 MG tablet TAKE ONE TABLET BY MOUTH TWICE DAILY 180 tablet 1    clobetasol (TEMOVATE) 0.05 % cream Apply topically 2 times daily as needed (psoriasis) For psoriasis (Patient taking differently: Apply topically as needed (psoriasis) For psoriasis) 30 g 3    Glucosamine-Chondroitin (GLUCOSAMINE CHONDR COMPLEX PO) Take by mouth 2 times daily      clotrimazole-betamethasone (LOTRISONE) 1-0.05 % cream Apply topically 2 times daily. (Patient taking differently: as needed Apply topically 2 times daily.) 30 g 1    nystatin (MYCOSTATIN) 480021 UNIT/GM powder Apply 3 times daily. (Patient taking differently: as needed Apply 3 times daily.) 30 g 1    clobetasol (TEMOVATE) 0.05 % ointment Apply 1 each topically 2 times daily Apply topically 2 times daily. (Patient taking differently: Apply 1 each topically as needed Apply topically 2 times daily. ) 45 g 5    sucralfate (CARAFATE) 1 GM tablet Take 1 tablet by mouth 4 times daily as needed (GERD) 120 tablet 1    Calcium Carbonate (CALCIUM 600 PO) Take 1,200 mg by mouth daily      multivitamin (THERAGRAN) per tablet Take 1 tablet by mouth daily.  Cholecalciferol (VITAMIN D) 2000 UNITS TABS Take 1,000 Units by mouth daily.  albuterol sulfate HFA (VENTOLIN HFA) 108 (90 Base) MCG/ACT inhaler Inhale 2 puffs into the lungs every 6 hours as needed for Wheezing (Patient not taking: Reported on 1/7/2020) 1 Inhaler 5     No current facility-administered medications for this visit.         Allergies   Allergen Reactions    Biaxin [Clarithromycin]     Levofloxacin        Family History   Problem Relation Age of Onset    Heart Attack Father     Hypertension Father     Heart Disease Father     Stroke Mother     Cancer Mother     Heart Disease Paternal Uncle     Diabetes Paternal Uncle     No Known Problems Other     High Blood Pressure Sister     Depression Sister     High Blood Pressure Brother     Depression Sister     High Blood Pressure Sister     Depression Sister     High Blood Pressure Sister     No Known Problems Brother        Social History     Socioeconomic History    Marital status:      Spouse name: Not on file    Number of children: Not on file    Years of education: Not on file    Highest education level: Not on file   Occupational History    Occupation: homemaker    Social Needs    Financial resource strain: Not on file    Food insecurity:     Worry: Not on file     Inability: Not on file    Transportation needs:     Medical: Not on file     Non-medical: Not on file   Tobacco Use    Smoking status: Current Every Day Smoker     Packs/day: 1.00     Years: 37.00     Pack years: 37.00     Types: Cigarettes     Start date: 7/9/2000    Smokeless tobacco: Never Used    Tobacco comment: smokes between 6-8 cigarettes a day   Substance and Sexual Activity    Alcohol use: Yes     Comment: glass of wine very rarely    Drug use: No    Sexual activity: Yes     Partners: Male     Comment: infrequent   Lifestyle    Physical activity:     Days per week: Not on file     Minutes per session: Not on file    Stress: Not on file   Relationships    Social connections:     Talks on phone: Not on file     Gets together: Not on file     Attends Buddhism service: Not on file     Active member of club or organization: Not on file     Attends meetings of clubs or organizations: Not on file     Relationship status: Not on file    Intimate partner violence:     Fear of current or ex partner: Not on file     Emotionally abused: Not on file     Physically abused: Not on file threatening disease. Patient and/or guardian verbalizes understanding and agrees with above counseling, assessment and plan. All questions answered.     Jero Matthews PA-C

## 2020-01-09 RX ORDER — METOPROLOL TARTRATE 50 MG/1
TABLET, FILM COATED ORAL
Qty: 180 TABLET | Refills: 0 | Status: SHIPPED
Start: 2020-01-09 | End: 2020-04-13

## 2020-01-14 ENCOUNTER — TELEPHONE (OUTPATIENT)
Dept: ADMINISTRATIVE | Age: 60
End: 2020-01-14

## 2020-02-20 ENCOUNTER — OFFICE VISIT (OUTPATIENT)
Dept: CARDIOLOGY CLINIC | Age: 60
End: 2020-02-20
Payer: MEDICAID

## 2020-02-20 VITALS
HEIGHT: 63 IN | RESPIRATION RATE: 18 BRPM | SYSTOLIC BLOOD PRESSURE: 128 MMHG | DIASTOLIC BLOOD PRESSURE: 80 MMHG | HEART RATE: 59 BPM | WEIGHT: 213.1 LBS | BODY MASS INDEX: 37.76 KG/M2

## 2020-02-20 PROCEDURE — G8484 FLU IMMUNIZE NO ADMIN: HCPCS | Performed by: INTERNAL MEDICINE

## 2020-02-20 PROCEDURE — 99244 OFF/OP CNSLTJ NEW/EST MOD 40: CPT | Performed by: INTERNAL MEDICINE

## 2020-02-20 PROCEDURE — 93000 ELECTROCARDIOGRAM COMPLETE: CPT | Performed by: INTERNAL MEDICINE

## 2020-02-20 PROCEDURE — G8427 DOCREV CUR MEDS BY ELIG CLIN: HCPCS | Performed by: INTERNAL MEDICINE

## 2020-02-20 PROCEDURE — G8417 CALC BMI ABV UP PARAM F/U: HCPCS | Performed by: INTERNAL MEDICINE

## 2020-02-20 NOTE — PROGRESS NOTES
OUTPATIENT CARDIOLOGY CONSULT    Name: Lori Chappell    Age: 62 y.o. Date of Service: 2/20/2020    Reason for Consultation: Abnormal EKG, PAT     Referring Physician: Jacki Aguayo PA-C    History of Present Illness:  Lori Chappell is a 62 y.o. female who presents today for further evaluation of abnormal EKG and paroxysmal atrial tachycardia. She was previously followed by Dr. Jaiden Lynch (> 3 years ago). Her PMH is outlined in detail below (see A/P below). She is typically active active with no complaints of chest pain, SOB, palpitations (as long as she takes her beta blocker), or syncope. No history of PND or orthopnea. She is currently with no active cardiac complaints at rest. SR on EKG.     Review of Systems:  Cardiac: As per HPI  General: No fever, chills  Pulmonary: As per HPI  HEENT: No visual disturbances, difficult swallowing  GI: No nausea, vomiting  Endocrine: +hypothyroidism, no DM  Musculoskeletal: FERNÁNDEZ x 4, no focal motor deficits  Skin: Intact, no rashes  Neuro/Psych: No headache or seizures    Past Medical History:  Past Medical History:   Diagnosis Date    Angina     Anxiety attack     Arrhythmia     Symptomatic palpitatoins    CAD (coronary artery disease)     CAD (coronary artery disease)     Chronic depression     Fatigue     Hyperlipidemia     Hypertension     Hypothyroidism     Obesity (BMI 35.0-39.9 without comorbidity) 12/27/2016    Osteoarthritis     Sleep apnea     Stage 1 mild COPD by GOLD classification ([address] 6/4/2018    Thyroid disease     hypothyroidism    Uncontrolled daytime somnolence        Past Surgical History:  Past Surgical History:   Procedure Laterality Date    ABDOMINAL ADHESION SURGERY      and endometrioma removal    CARPAL TUNNEL RELEASE Right     Right CTR  Dr. Sonia Mckeon      x 2    FINGER TRIGGER RELEASE Right     Right trigger thumb release Dr. Maddy Long  FINGER TRIGGER RELEASE Left     Left trigger thumb release Dr. Carroll Kras      and BSO    OVARIAN CYST REMOVAL Left     UTERINE FIBROID SURGERY         Family History:  Family History   Problem Relation Age of Onset    Heart Attack Father     Hypertension Father     Heart Disease Father     Stroke Mother     Cancer Mother     Heart Disease Paternal Uncle     Diabetes Paternal Uncle     No Known Problems Other     High Blood Pressure Sister     Depression Sister     High Blood Pressure Brother     Depression Sister     High Blood Pressure Sister     Depression Sister     High Blood Pressure Sister     No Known Problems Brother      Father -- passed away at age 46 from an MI    Social History:  Social History     Socioeconomic History    Marital status:      Spouse name: Not on file    Number of children: Not on file    Years of education: Not on file    Highest education level: Not on file   Occupational History    Occupation: homemaker    Social Needs    Financial resource strain: Not on file    Food insecurity:     Worry: Not on file     Inability: Not on file    Transportation needs:     Medical: Not on file     Non-medical: Not on file   Tobacco Use    Smoking status: Current Every Day Smoker     Packs/day: 1.00     Years: 37.00     Pack years: 37.00     Types: Cigarettes     Start date: 7/9/2000    Smokeless tobacco: Never Used    Tobacco comment: smokes between 6-8 cigarettes a day   Substance and Sexual Activity    Alcohol use: Yes     Comment: glass of wine very rarely    Drug use: No    Sexual activity: Yes     Partners: Male     Comment: infrequent   Lifestyle    Physical activity:     Days per week: Not on file     Minutes per session: Not on file    Stress: Not on file   Relationships    Social connections:     Talks on phone: Not on file     Gets together: Not on file     Attends Yazidi service: Not on file     Active member of club or 01/07/2020     01/07/2020    CO2 26 01/07/2020     Lab Results   Component Value Date    MG 2.2 09/13/2011     Lab Results   Component Value Date    WBC 7.8 01/07/2020    HGB 13.7 01/07/2020    HCT 43.5 01/07/2020    MCV 95.2 01/07/2020     01/07/2020     Lab Results   Component Value Date    ALT 18 01/07/2020    AST 19 01/07/2020    ALKPHOS 62 01/07/2020    BILITOT 0.4 01/07/2020     Lab Results   Component Value Date    CKMB 2.7 09/13/2011    TROPONINI <0.01 09/13/2011     Lab Results   Component Value Date    INR 1.1 08/28/2015    INR 1.1 09/13/2011    PROTIME 11.8 08/28/2015    PROTIME 11.5 09/13/2011     Lab Results   Component Value Date    TSH 3.000 01/07/2020     Lab Results   Component Value Date    LABA1C 5.5 01/07/2020     No results found for: EAG  Lab Results   Component Value Date    CHOL 124 01/07/2020    CHOL 141 07/09/2019    CHOL 133 01/08/2019     Lab Results   Component Value Date    TRIG 129 01/07/2020    TRIG 123 07/09/2019    TRIG 200 (H) 01/08/2019     Lab Results   Component Value Date    HDL 37 01/07/2020    HDL 39 07/09/2019    HDL 36 01/08/2019     Lab Results   Component Value Date    LDLCALC 61 01/07/2020    LDLCALC 77 07/09/2019    LDLCALC 57 01/08/2019     Lab Results   Component Value Date    LABVLDL 26 01/07/2020    LABVLDL 25 07/09/2019    LABVLDL 40 01/08/2019     No results found for: CHOLHDLRATIO  No results for input(s): PROBNP in the last 72 hours. Cardiac Tests:  ECG: SB, rate 61, old IWMI and AWMI pattern (similar to prior EKG's since 8/2011)    ASSESSMENT / PLAN:  1. Abnormal EKG (similar to prior EKG's since 8/2011)  2. History of paroxysmal atrial tachycardia / palpitations -- no recent episodes, on BB  3. HTN -- BP today 128/80  4. HLD -- on statin  5. BMI 37.75  6. DARRIN -- she is compliant with treatment  7. Ongoing tobacco abuse -- currenty 0.25 PPD  8. [facility] of CAD  9.  Hypothyroidism -- on synthroid, normal TSH  10. Echocardiogram (8/2011): normal LV size/function, EF 60-65%, normal RV size/function, stage 2 DD, trace MR/TR, normal estimated PASP  11. Julia Mutton nuclear stress test (8/2011): negative, EF 65% with normal wall motion    - Prior cardiac studies reviewed today  - Continue current medications (including beta blocker)  - Discussed option of cardiac monitoring pending clinical course (no recent palpitations, on BB)  - Aggressive risk factor modifications / counseled re: tobacco cessation  - Treatment of DARRIN (patient is compliant with treatment)    Thank you for allowing me to participate in your patient's care. Please feel free to contact me if you have any questions or concerns.     Andrés Weinstein MD  [phone] Cardiology

## 2020-04-13 RX ORDER — METOPROLOL TARTRATE 50 MG/1
TABLET, FILM COATED ORAL
Qty: 180 TABLET | Refills: 0 | Status: SHIPPED
Start: 2020-04-13 | End: 2020-07-13

## 2020-06-15 RX ORDER — LOSARTAN POTASSIUM AND HYDROCHLOROTHIAZIDE 25; 100 MG/1; MG/1
1 TABLET ORAL DAILY
Qty: 90 TABLET | Refills: 1 | Status: SHIPPED
Start: 2020-06-15 | End: 2020-12-09

## 2020-07-07 ENCOUNTER — HOSPITAL ENCOUNTER (OUTPATIENT)
Age: 60
Discharge: HOME | End: 2020-07-09
Payer: MEDICAID

## 2020-07-07 ENCOUNTER — OFFICE VISIT (OUTPATIENT)
Dept: PRIMARY CARE CLINIC | Age: 60
End: 2020-07-07
Payer: MEDICAID

## 2020-07-07 VITALS
DIASTOLIC BLOOD PRESSURE: 74 MMHG | BODY MASS INDEX: 37.91 KG/M2 | SYSTOLIC BLOOD PRESSURE: 118 MMHG | TEMPERATURE: 97.2 F | WEIGHT: 214 LBS | OXYGEN SATURATION: 95 % | HEART RATE: 79 BPM

## 2020-07-07 LAB
ALBUMIN SERPL-MCNC: 4.1 G/DL (ref 3.5–5.2)
ALP BLD-CCNC: 61 U/L (ref 35–104)
ALT SERPL-CCNC: 23 U/L (ref 0–32)
ANION GAP SERPL CALCULATED.3IONS-SCNC: 17 MMOL/L (ref 7–16)
AST SERPL-CCNC: 23 U/L (ref 0–31)
BILIRUB SERPL-MCNC: 0.4 MG/DL (ref 0–1.2)
BUN BLDV-MCNC: 13 MG/DL (ref 8–23)
CALCIUM SERPL-MCNC: 10 MG/DL (ref 8.6–10.2)
CHLORIDE BLD-SCNC: 101 MMOL/L (ref 98–107)
CHOLESTEROL, TOTAL: 116 MG/DL (ref 0–199)
CO2: 24 MMOL/L (ref 22–29)
CREAT SERPL-MCNC: 0.8 MG/DL (ref 0.5–1)
GFR AFRICAN AMERICAN: >60
GFR NON-AFRICAN AMERICAN: >60 ML/MIN/1.73
GLUCOSE BLD-MCNC: 101 MG/DL (ref 74–99)
HBA1C MFR BLD: 5.6 % (ref 4–5.6)
HCT VFR BLD CALC: 42.4 % (ref 34–48)
HDLC SERPL-MCNC: 33 MG/DL
HEMOGLOBIN: 13.2 G/DL (ref 11.5–15.5)
LDL CHOLESTEROL CALCULATED: 58 MG/DL (ref 0–99)
MCH RBC QN AUTO: 29.5 PG (ref 26–35)
MCHC RBC AUTO-ENTMCNC: 31.1 % (ref 32–34.5)
MCV RBC AUTO: 94.9 FL (ref 80–99.9)
PDW BLD-RTO: 13 FL (ref 11.5–15)
PLATELET # BLD: 132 E9/L (ref 130–450)
PMV BLD AUTO: 12.3 FL (ref 7–12)
POTASSIUM SERPL-SCNC: 4.5 MMOL/L (ref 3.5–5)
RBC # BLD: 4.47 E12/L (ref 3.5–5.5)
SODIUM BLD-SCNC: 142 MMOL/L (ref 132–146)
TOTAL PROTEIN: 7.3 G/DL (ref 6.4–8.3)
TRIGL SERPL-MCNC: 124 MG/DL (ref 0–149)
TSH SERPL DL<=0.05 MIU/L-ACNC: 1.65 UIU/ML (ref 0.27–4.2)
URIC ACID, SERUM: 2.6 MG/DL (ref 2.4–5.7)
VLDLC SERPL CALC-MCNC: 25 MG/DL
WBC # BLD: 7.2 E9/L (ref 4.5–11.5)

## 2020-07-07 PROCEDURE — 84443 ASSAY THYROID STIM HORMONE: CPT

## 2020-07-07 PROCEDURE — 99214 OFFICE O/P EST MOD 30 MIN: CPT | Performed by: PHYSICIAN ASSISTANT

## 2020-07-07 PROCEDURE — G8427 DOCREV CUR MEDS BY ELIG CLIN: HCPCS | Performed by: PHYSICIAN ASSISTANT

## 2020-07-07 PROCEDURE — 4004F PT TOBACCO SCREEN RCVD TLK: CPT | Performed by: PHYSICIAN ASSISTANT

## 2020-07-07 PROCEDURE — 3017F COLORECTAL CA SCREEN DOC REV: CPT | Performed by: PHYSICIAN ASSISTANT

## 2020-07-07 PROCEDURE — 84550 ASSAY OF BLOOD/URIC ACID: CPT

## 2020-07-07 PROCEDURE — 80053 COMPREHEN METABOLIC PANEL: CPT

## 2020-07-07 PROCEDURE — G8417 CALC BMI ABV UP PARAM F/U: HCPCS | Performed by: PHYSICIAN ASSISTANT

## 2020-07-07 PROCEDURE — 85027 COMPLETE CBC AUTOMATED: CPT

## 2020-07-07 PROCEDURE — 83036 HEMOGLOBIN GLYCOSYLATED A1C: CPT

## 2020-07-07 PROCEDURE — 80061 LIPID PANEL: CPT

## 2020-07-07 NOTE — PROGRESS NOTES
Aarti Rossi  1960 7/7/20      HPI:    Patient presents for her 6 month check. She notes overall she has been feeling well, her anxiety has has been up some with the coronavirus, and while at sores, but she notes it has been improving, and does deep breathing, and feels this is the best Tx right now. She is also swimming a lot for exercise, and mental health and this helps. She has noticed her feet have become more painful, denies gouty attacks, but nots her 2nd R toe will throb and ache at times, for 1 week then improve, it isn't like her normal gout attack, she also notes pes planus. Hypothyroidism has been controlled, last TSH 1/2020, all labs at least check were OK, lipids, cmp and cbc. A1c was stable. She hasn't lost weigh but has maintained, she really wants to try and be better with her diet. She tells me she will make appointment with Dr. Davonte Del Real for annual and mammogram, she tells me she doesn't t want me to order. Also, has cologuard at home still, and will complete.      Past Medical History:   Diagnosis Date    Angina     Anxiety attack     Arrhythmia     Symptomatic palpitatoins    CAD (coronary artery disease)     CAD (coronary artery disease)     Chronic depression     Fatigue     Hyperlipidemia     Hypertension     Hypothyroidism     Obesity (BMI 35.0-39.9 without comorbidity) 12/27/2016    Osteoarthritis     Sleep apnea     Stage 1 mild COPD by GOLD classification ([address] 6/4/2018    Thyroid disease     hypothyroidism    Uncontrolled daytime somnolence         Past Surgical History:   Procedure Laterality Date    ABDOMINAL ADHESION SURGERY      and endometrioma removal    CARPAL TUNNEL RELEASE Right     Right CTR  Dr. Juanito Robbins      x 2    FINGER TRIGGER RELEASE Right     Right trigger thumb release Dr. Adri Norton Left     Left trigger thumb release Dr. Lala Forbes      and BSO    OVARIAN CYST REMOVAL Left     UTERINE FIBROID SURGERY         Current Outpatient Medications   Medication Sig Dispense Refill    losartan-hydrochlorothiazide (HYZAAR) 100-25 MG per tablet Take 1 tablet by mouth daily 90 tablet 1    metoprolol tartrate (LOPRESSOR) 50 MG tablet Take 1 tablet by mouth twice daily 180 tablet 0    allopurinol (ZYLOPRIM) 300 MG tablet Take 1 tablet by mouth daily 90 tablet 1    DULoxetine (CYMBALTA) 60 MG extended release capsule Take 1 capsule by mouth daily 90 capsule 1    fenofibrate 160 MG tablet Take 1 tablet by mouth daily 90 tablet 1    levothyroxine (SYNTHROID) 75 MCG tablet Take 1 tablet by mouth daily 90 tablet 1    pravastatin (PRAVACHOL) 40 MG tablet Take 1 tablet by mouth daily 90 tablet 1    ketoconazole (NIZORAL) 2 % cream Apply topically daily. 30 g 1    losartan (COZAAR) 100 MG tablet Take 1 tablet by mouth daily 30 tablet 5    hydrochlorothiazide (HYDRODIURIL) 25 MG tablet Take 1 tablet by mouth every morning 30 tablet 5    clobetasol (TEMOVATE) 0.05 % cream Apply topically 2 times daily as needed (psoriasis) For psoriasis (Patient taking differently: Apply topically as needed (psoriasis) For psoriasis) 30 g 3    Glucosamine-Chondroitin (GLUCOSAMINE CHONDR COMPLEX PO) Take by mouth 2 times daily      clotrimazole-betamethasone (LOTRISONE) 1-0.05 % cream Apply topically 2 times daily. (Patient taking differently: as needed Apply topically 2 times daily.) 30 g 1    nystatin (MYCOSTATIN) 018840 UNIT/GM powder Apply 3 times daily. (Patient taking differently: as needed Apply 3 times daily.) 30 g 1    clobetasol (TEMOVATE) 0.05 % ointment Apply 1 each topically 2 times daily Apply topically 2 times daily.  (Patient taking differently: Apply 1 each topically as needed Apply topically 2 times daily. ) 45 g 5    sucralfate (CARAFATE) 1 GM tablet Take 1 tablet by mouth 4 times daily as needed (GERD) 120 tablet 1  albuterol sulfate HFA (VENTOLIN HFA) 108 (90 Base) MCG/ACT inhaler Inhale 2 puffs into the lungs every 6 hours as needed for Wheezing 1 Inhaler 5    Calcium Carbonate (CALCIUM 600 PO) Take 1,200 mg by mouth daily      multivitamin (THERAGRAN) per tablet Take 1 tablet by mouth daily.  Cholecalciferol (VITAMIN D) 2000 UNITS TABS Take 1,000 Units by mouth daily. No current facility-administered medications for this visit.         Allergies   Allergen Reactions    Biaxin [Clarithromycin]     Levofloxacin        Family History   Problem Relation Age of Onset    Heart Attack Father     Hypertension Father     Heart Disease Father     Stroke Mother     Cancer Mother     Heart Disease Paternal Uncle     Diabetes Paternal Uncle     No Known Problems Other     High Blood Pressure Sister     Depression Sister     High Blood Pressure Brother     Depression Sister     High Blood Pressure Sister     Depression Sister     High Blood Pressure Sister     No Known Problems Brother        Social History     Socioeconomic History    Marital status:      Spouse name: Not on file    Number of children: Not on file    Years of education: Not on file    Highest education level: Not on file   Occupational History    Occupation: homemaker    Social Needs    Financial resource strain: Not on file    Food insecurity     Worry: Not on file     Inability: Not on file    Transportation needs     Medical: Not on file     Non-medical: Not on file   Tobacco Use    Smoking status: Current Every Day Smoker     Packs/day: 1.00     Years: 37.00     Pack years: 37.00     Types: Cigarettes     Start date: 7/9/2000    Smokeless tobacco: Never Used    Tobacco comment: smokes between 6-8 cigarettes a day   Substance and Sexual Activity    Alcohol use: Yes     Comment: glass of wine very rarely    Drug use: No    Sexual activity: Yes     Partners: Male     Comment: infrequent   Lifestyle    Physical activity     Days per week: Not on file     Minutes per session: Not on file    Stress: Not on file   Relationships    Social connections     Talks on phone: Not on file     Gets together: Not on file     Attends Jew service: Not on file     Active member of club or organization: Not on file     Attends meetings of clubs or organizations: Not on file     Relationship status: Not on file    Intimate partner violence     Fear of current or ex partner: Not on file     Emotionally abused: Not on file     Physically abused: Not on file     Forced sexual activity: Not on file   Other Topics Concern    Not on file   Social History Narrative    Not on file       Review of Systems :     Constitutional: Negative for fatigue, malaise, fever, chills, appetite change and unexpected weight change. HENT: Negative for congestion, ear discharge, ear pain, facial swelling, mouth sores, postnasal drip, rhinorrhea, sinus pressure, sore throat, tinnitus and trouble swallowing. Eyes: Negative for vision changes, double vision, pain  Respiratory: Negative for cough, chest tightness, shortness of breath, chest heaviness, pleuritic pain,  wheezing. Cardiovascular: Negative for diaphoresis, chest pain, palpitations, or edema   Gastrointestinal: Negative for nausea, vomiting, abdominal pain, diarrhea, constipation, blood in stool, melena, changes in bowel habits. Endocrine: Negative for polydipsia, polyphagia and polyuria. No heat or cold intolerance. Genitourinary: Negative for dysuria, urgency, frequency, hematuria, difficulty urinating, nocturia. Musculoskeletal:   No gait disturbance. Skin: Negative for rash, lesions, hair or nail changes. Allergic/Immunologic: Negative for environmental allergies and food allergies. Neurological: Negative for dizziness, weakness, tremors, syncope, speech difficulty, light-headedness, bowel or bladder control changes, numbness and headaches.    Hematological: Negative for adenopathy. Does not bruise/bleed easily. Psychiatric/Behavioral: Negative for suicidal ideas, behavioral problems, sleep disturbance and decreased concentration. Unless otherwise stated in this report or unable to obtain because of the patient's clinical or mental status as evidenced by the medical record, this patients's positive and negative responses for Review of Systems, constitutional, psych, eyes, ENT, cardiovascular, respiratory, gastrointestinal, neurological, genitourinary, musculoskeletal, integument systems and systems related to the presenting problem are either stated in the preceding or were not pertinent or were negative for the symptoms and/or complaints related to the medical problem. Physical Exam:   Vitals:    07/07/20 0920   BP: 118/74   Pulse: 79   Temp: 97.2 °F (36.2 °C)   SpO2: 95%   Weight: 214 lb (97.1 kg)     Constitutional:  A and O x 3, in NAD. Afebrile. Appears stated age. Head:  NCAT. Eyes:  PERRLA, EOMI without nystagmus. Conjunctiva normal. Sclera anicteric. Ears:  External ears without lesions. TM's clear bilaterally. Throat:  Pharynx without lesions. Airway patient. Mucous membranes pink and moist without lesions. Neck:  Supple. Nontender, no lymphadenopathy noted, no thyromegaly. Lungs:  Clear to auscultation and breath sounds equal.  Heart:  Regular rate and rhythm, normal S1 and S2, no m/r/g.   UE and LE distal pulses intact. Abdomen:  Soft, NTND, NABS x 4, no masses or organomegaly, no rebound or guarding. MS: Normal, painless range of motion. No neurovascular deficit. 5/5 strength in UE's/LE's/ bilaterally. Skin:  No abrasions, ecchymoses, or lacerations unless noted elsewhere. Extremities  No cyanosis, clubbing, or edema noted. Neurological:   Oriented. Motor functions intact. DTRs UE and LE intact.      Assessment/Plan:     Madeline Brady was seen today for 6 month follow-up, hypertension, hypothyroidism, hyperlipidemia and blood work.    Diagnoses and all orders for this visit:    Pes planus of both feet  -     External Referral To Podiatry    Pain of toe of right foot  -     External Referral To Podiatry  -Dr. Lakeisha Hines  Hypothyroidism, unspecified type  -     TSH; Future    Elevated blood uric acid level  -     URIC ACID; Future    Essential hypertension  -     COMPREHENSIVE METABOLIC PANEL; Future  -     CBC; Future    Mixed hyperlipidemia  -     COMPREHENSIVE METABOLIC PANEL; Future  -     CBC; Future  -     LIPID PANEL; Future    Coronary artery disease involving native heart without angina pectoris, unspecified vessel or lesion type  -Fu with cardiology, Dr. Linden Blanchard, PAT has been controlled as well. Impaired fasting glucose  -     HEMOGLOBIN A1C; Future    FU in 6 months unless needs to come back sooner. She will obtain mammogram(she prefer to get through GYN), and we will discussed cologuard again at next visit if she hasn't completed. Return in about 6 months (around 1/7/2021). Counseled regarding above diagnosis, including possible risks and complications,  especially if left uncontrolled. Counseled regarding the possible side effects, risks, benefits and alternatives to treatment; patient and/or guardian verbalizes understanding, agrees, feels comfortable with and wishes to proceed with above treatment plan. Advised patient to call with any new medication issues, and read all Rx info from pharmacy to assure aware of all possible risks and side effects of medication before taking. Reviewed age and gender appropriate health screening exams and vaccinations. Advised patient regarding importance of keeping up with recommended health maintenance and to schedule as soon as possible if overdue, as this is important in assessing for undiagnosed pathology, especially cancer, as well as protecting against potentially harmful/life threatening disease.        Patient and/or guardian verbalizes understanding and agrees with above counseling, assessment and plan. All questions answered.     Ave Ball PA-C

## 2020-09-08 RX ORDER — LEVOTHYROXINE SODIUM 75 UG/1
TABLET ORAL
Qty: 90 TABLET | Refills: 0 | Status: SHIPPED
Start: 2020-09-08 | End: 2020-12-07

## 2020-11-03 PROBLEM — I10 HYPERTENSION: Status: RESOLVED | Noted: 2020-11-03 | Resolved: 2020-11-03

## 2020-12-07 RX ORDER — LEVOTHYROXINE SODIUM 75 UG/1
TABLET ORAL
Qty: 90 TABLET | Refills: 0 | Status: SHIPPED
Start: 2020-12-07 | End: 2021-01-08 | Stop reason: SDUPTHER

## 2020-12-09 RX ORDER — LOSARTAN POTASSIUM AND HYDROCHLOROTHIAZIDE 25; 100 MG/1; MG/1
TABLET ORAL
Qty: 90 TABLET | Refills: 0 | Status: SHIPPED
Start: 2020-12-09 | End: 2021-01-08 | Stop reason: SDUPTHER

## 2020-12-22 RX ORDER — DULOXETIN HYDROCHLORIDE 60 MG/1
CAPSULE, DELAYED RELEASE ORAL
Qty: 90 CAPSULE | Refills: 0 | Status: SHIPPED
Start: 2020-12-22 | End: 2021-01-08 | Stop reason: SDUPTHER

## 2021-01-08 ENCOUNTER — VIRTUAL VISIT (OUTPATIENT)
Dept: PRIMARY CARE CLINIC | Age: 61
End: 2021-01-08
Payer: MEDICAID

## 2021-01-08 DIAGNOSIS — E79.0 ELEVATED BLOOD URIC ACID LEVEL: ICD-10-CM

## 2021-01-08 DIAGNOSIS — R73.01 IMPAIRED FASTING GLUCOSE: ICD-10-CM

## 2021-01-08 DIAGNOSIS — F32.A CHRONIC DEPRESSION: ICD-10-CM

## 2021-01-08 DIAGNOSIS — E78.2 MIXED HYPERLIPIDEMIA: ICD-10-CM

## 2021-01-08 DIAGNOSIS — I10 ESSENTIAL HYPERTENSION: Primary | ICD-10-CM

## 2021-01-08 DIAGNOSIS — I49.9 CARDIAC ARRHYTHMIA, UNSPECIFIED CARDIAC ARRHYTHMIA TYPE: ICD-10-CM

## 2021-01-08 DIAGNOSIS — E03.9 HYPOTHYROIDISM, UNSPECIFIED TYPE: ICD-10-CM

## 2021-01-08 DIAGNOSIS — L40.1 PUSTULAR PSORIASIS: ICD-10-CM

## 2021-01-08 DIAGNOSIS — R43.0 LOSS OF SMELL: ICD-10-CM

## 2021-01-08 PROCEDURE — G8484 FLU IMMUNIZE NO ADMIN: HCPCS | Performed by: PHYSICIAN ASSISTANT

## 2021-01-08 PROCEDURE — 3017F COLORECTAL CA SCREEN DOC REV: CPT | Performed by: PHYSICIAN ASSISTANT

## 2021-01-08 PROCEDURE — G8417 CALC BMI ABV UP PARAM F/U: HCPCS | Performed by: PHYSICIAN ASSISTANT

## 2021-01-08 PROCEDURE — 99214 OFFICE O/P EST MOD 30 MIN: CPT | Performed by: PHYSICIAN ASSISTANT

## 2021-01-08 PROCEDURE — G8428 CUR MEDS NOT DOCUMENT: HCPCS | Performed by: PHYSICIAN ASSISTANT

## 2021-01-08 PROCEDURE — 4004F PT TOBACCO SCREEN RCVD TLK: CPT | Performed by: PHYSICIAN ASSISTANT

## 2021-01-08 RX ORDER — DULOXETIN HYDROCHLORIDE 60 MG/1
CAPSULE, DELAYED RELEASE ORAL
Qty: 90 CAPSULE | Refills: 0 | Status: SHIPPED
Start: 2021-01-08 | End: 2021-06-18

## 2021-01-08 RX ORDER — FENOFIBRATE 160 MG/1
TABLET ORAL
Qty: 90 TABLET | Refills: 1 | Status: SHIPPED
Start: 2021-01-08 | End: 2021-06-22

## 2021-01-08 RX ORDER — METOPROLOL TARTRATE 50 MG/1
50 TABLET, FILM COATED ORAL 2 TIMES DAILY
Qty: 180 TABLET | Refills: 1 | Status: SHIPPED
Start: 2021-01-08 | End: 2021-06-22

## 2021-01-08 RX ORDER — LEVOTHYROXINE SODIUM 0.07 MG/1
TABLET ORAL
Qty: 90 TABLET | Refills: 0 | Status: SHIPPED
Start: 2021-01-08 | End: 2021-02-23

## 2021-01-08 RX ORDER — PRAVASTATIN SODIUM 40 MG
TABLET ORAL
Qty: 90 TABLET | Refills: 1 | Status: SHIPPED
Start: 2021-01-08 | End: 2021-06-22

## 2021-01-08 RX ORDER — LOSARTAN POTASSIUM AND HYDROCHLOROTHIAZIDE 25; 100 MG/1; MG/1
TABLET ORAL
Qty: 90 TABLET | Refills: 0 | Status: SHIPPED
Start: 2021-01-08 | End: 2021-06-04

## 2021-01-08 RX ORDER — CLOBETASOL PROPIONATE 0.5 MG/G
CREAM TOPICAL 2 TIMES DAILY PRN
Qty: 30 G | Refills: 3 | Status: SHIPPED
Start: 2021-01-08 | End: 2022-06-06 | Stop reason: SDUPTHER

## 2021-01-08 RX ORDER — ALLOPURINOL 300 MG/1
TABLET ORAL
Qty: 90 TABLET | Refills: 1 | Status: SHIPPED
Start: 2021-01-08 | End: 2021-06-22

## 2021-01-08 ASSESSMENT — ENCOUNTER SYMPTOMS
RESPIRATORY NEGATIVE: 1
GASTROINTESTINAL NEGATIVE: 1

## 2021-01-08 NOTE — PROGRESS NOTES
TELEHEALTH VIDEO VISIT    HPI:    Janae Del Rosario (:  1960) has requested an audio/video evaluation for the following concern(s):    Chief Complaint   Patient presents with    6 Month Follow-Up     needs refill    Hypertension       Pt presents for her 6 month follow up. PT does feel she may have had covid over November, early December, had URI Sx of cough and chets congtesion, and loss of smell and tastse. She notes her smell and taste not back 100% yet, but her URI Sx are improved. She did see podiatry and got inserts for her feet. Her toes and feet do feel better when she wears them. She has had no gout attacks. Uric acid stable on allopurinol. Lipids have been stable on statina nd fibrate. thyroid has been euthyroid, no new Sx. Needs labs done. COPD stable, stage 1, sees pulm for DARRIN, on CPAP. She has checked home BP readings on occaison, and always under 130/80s. No issues. No palps, tachycardia controlled on metoprolol. Sees Dr. Bebe Mcpherson.   Depression has been stable per pt, she is in her own routine at home, and tries to maintain this. Moods OK. She still refuses mammogram, and cologuard is still at home. Review of Systems   Constitutional: Negative. HENT: Negative. Respiratory: Negative. Cardiovascular: Negative. Gastrointestinal: Negative. Genitourinary: Negative. Skin: Positive for rash (needs temovate refilled. ). Neurological: Negative. Hematological: Negative. Psychiatric/Behavioral: Positive for dysphoric mood. Negative for agitation, confusion, decreased concentration, hallucinations, self-injury, sleep disturbance and suicidal ideas. The patient is not nervous/anxious and is not hyperactive. All other systems reviewed and are negative. Prior to Visit Medications    Medication Sig Taking?  Authorizing Provider   metoprolol tartrate (LOPRESSOR) 50 MG tablet Take 1 tablet by mouth 2 times daily Yes Mihaela Romero PA-C DULoxetine (CYMBALTA) 60 MG extended release capsule Take 1 capsule by mouth once daily Yes Larry Ansari PA-C   losartan-hydroCHLOROthiazide (HYZAAR) 100-25 MG per tablet Take 1 tablet by mouth once daily Yes Larry Ansari PA-C   fenofibrate (TRIGLIDE) 160 MG tablet Take 1 tablet by mouth once daily Yes Larry Ansari PA-C   pravastatin (PRAVACHOL) 40 MG tablet Take 1 tablet by mouth once daily Yes Larry Ansari PA-C   allopurinol (ZYLOPRIM) 300 MG tablet Take 1 tablet by mouth once daily Yes Larry Ansari PA-C   levothyroxine (EUTHYROX) 75 MCG tablet Take 1 tablet by mouth once daily Yes Larry Ansari PA-C   clobetasol (TEMOVATE) 0.05 % cream Apply topically 2 times daily as needed (psoriasis) For psoriasis Yes Opal Boland PA-C   ketoconazole (NIZORAL) 2 % cream Apply topically daily. Larry Ansari PA-C   losartan (COZAAR) 100 MG tablet Take 1 tablet by mouth daily  Larry Ansari PA-C   hydrochlorothiazide (HYDRODIURIL) 25 MG tablet Take 1 tablet by mouth every morning  Larry Ansari PA-C   Glucosamine-Chondroitin (GLUCOSAMINE CHONDR COMPLEX PO) Take by mouth 2 times daily  Historical Provider, MD   clotrimazole-betamethasone (LOTRISONE) 1-0.05 % cream Apply topically 2 times daily. Patient taking differently: as needed Apply topically 2 times daily. Larry Ansari PA-C   nystatin (MYCOSTATIN) 546081 UNIT/GM powder Apply 3 times daily. Patient taking differently: as needed Apply 3 times daily. Larry Ansari PA-C   clobetasol (TEMOVATE) 0.05 % ointment Apply 1 each topically 2 times daily Apply topically 2 times daily. Patient taking differently: Apply 1 each topically as needed Apply topically 2 times daily.    Larry Ansari PA-C   sucralfate (CARAFATE) 1 GM tablet Take 1 tablet by mouth 4 times daily as needed (GERD)  Larry Ansari PA-C [x] Alert and awake  [x] Oriented to person/place/time [x]Able to follow commands      Eyes:  EOM    []  Normal  [] Abnormal-  Sclera  []  Normal  [] Abnormal -         Discharge []  None visible  [] Abnormal -    HENT:   [x] Normocephalic, atraumatic. [] Abnormal   [x] Mouth/Throat: Mucous membranes are moist.     External Ears [x] Normal  [] Abnormal-     Neck: [x] No visualized mass     Pulmonary/Chest: [x] Respiratory effort normal.  [x] No visualized signs of difficulty breathing or respiratory distress        [] Abnormal-      Musculoskeletal:   [] Normal gait with no signs of ataxia         [] Normal range of motion of neck        [] Abnormal-       Neurological:        [] No Facial Asymmetry (Cranial nerve 7 motor function) (limited exam to video visit)          [] No gaze palsy        [] Abnormal-         Skin:        [x] No significant exanthematous lesions or discoloration noted on facial skin         [] Abnormal-            Psychiatric:       [x] Normal Affect [] No Hallucinations        [] Abnormal-     Other pertinent observable physical exam findings-     ASSESSMENT/PLAN:  1. Essential hypertension  - metoprolol tartrate (LOPRESSOR) 50 MG tablet; Take 1 tablet by mouth 2 times daily  Dispense: 180 tablet; Refill: 1  - COMPREHENSIVE METABOLIC PANEL; Future  - LIPID PANEL; Future  - CBC; Future    2. Cardiac arrhythmia, unspecified cardiac arrhythmia type  - metoprolol tartrate (LOPRESSOR) 50 MG tablet; Take 1 tablet by mouth 2 times daily  Dispense: 180 tablet; Refill: 1    3. Chronic depression  - DULoxetine (CYMBALTA) 60 MG extended release capsule; Take 1 capsule by mouth once daily  Dispense: 90 capsule; Refill: 0    4. Mixed hyperlipidemia  - fenofibrate (TRIGLIDE) 160 MG tablet; Take 1 tablet by mouth once daily  Dispense: 90 tablet; Refill: 1  - pravastatin (PRAVACHOL) 40 MG tablet; Take 1 tablet by mouth once daily  Dispense: 90 tablet; Refill: 1  - LIPID PANEL;  Future 5. Elevated blood uric acid level  - allopurinol (ZYLOPRIM) 300 MG tablet; Take 1 tablet by mouth once daily  Dispense: 90 tablet; Refill: 1  - URIC ACID; Future    6. Hypothyroidism, unspecified type  - levothyroxine (EUTHYROX) 75 MCG tablet; Take 1 tablet by mouth once daily  Dispense: 90 tablet; Refill: 0  - TSH; Future    7. Pustular psoriasis  - clobetasol (TEMOVATE) 0.05 % cream; Apply topically 2 times daily as needed (psoriasis) For psoriasis  Dispense: 30 g; Refill: 3    8. Loss of smell, She would like to know if she had covid. I do feel it is likely. - Covid-19, Antibody; Future    9. Impaired fasting glucose  - HEMOGLOBIN A1C; Future    Pt encouraged to make appt with GYN, get mammo. And complete the cologuard by next visit. Return in about 6 months (around 7/8/2021). TeleMedicine video visit    This visit was performed as a virtual video visit using a synchronous, two-way, audio-video telehealth technology platform. Patient identification was verified at the start of the visit, including the patient's telephone number and physical location. I discussed with the patient the nature of our telehealth visits, that:     1. Due to the nature of an audio- video modality, the only components of a physical exam that could be done are the elements supported by direct observation. 2. I would evaluate the patient and recommend diagnostics and treatments based on my assessment. 3. If it was felt that the patient should be evaluated in clinic or an emergency room setting, then they would be directed there. 4. Our sessions are not being recorded and that personal health information is protected. 5. Our team would provide follow up care in person if/when the patient needs it. Patient does agree to proceed with telemedicine consultation. Patient's location: home address in [facility]  Physician  location other address in [facility] other people involved in call none.

## 2021-02-23 DIAGNOSIS — E03.9 HYPOTHYROIDISM, UNSPECIFIED TYPE: ICD-10-CM

## 2021-02-23 RX ORDER — LEVOTHYROXINE SODIUM 0.07 MG/1
TABLET ORAL
Qty: 90 TABLET | Refills: 0 | Status: SHIPPED
Start: 2021-02-23 | End: 2021-06-04

## 2021-06-04 DIAGNOSIS — E03.9 HYPOTHYROIDISM, UNSPECIFIED TYPE: ICD-10-CM

## 2021-06-04 RX ORDER — LOSARTAN POTASSIUM AND HYDROCHLOROTHIAZIDE 25; 100 MG/1; MG/1
TABLET ORAL
Qty: 90 TABLET | Refills: 0 | Status: SHIPPED
Start: 2021-06-04 | End: 2021-06-22

## 2021-06-04 RX ORDER — LEVOTHYROXINE SODIUM 0.07 MG/1
TABLET ORAL
Qty: 90 TABLET | Refills: 0 | Status: SHIPPED
Start: 2021-06-04 | End: 2021-09-07 | Stop reason: SDUPTHER

## 2021-06-17 DIAGNOSIS — F32.A CHRONIC DEPRESSION: ICD-10-CM

## 2021-06-18 RX ORDER — DULOXETIN HYDROCHLORIDE 60 MG/1
CAPSULE, DELAYED RELEASE ORAL
Qty: 90 CAPSULE | Refills: 0 | Status: SHIPPED
Start: 2021-06-18 | End: 2021-09-07 | Stop reason: SDUPTHER

## 2021-06-22 DIAGNOSIS — I49.9 CARDIAC ARRHYTHMIA, UNSPECIFIED CARDIAC ARRHYTHMIA TYPE: ICD-10-CM

## 2021-06-22 DIAGNOSIS — E78.2 MIXED HYPERLIPIDEMIA: ICD-10-CM

## 2021-06-22 DIAGNOSIS — E79.0 ELEVATED BLOOD URIC ACID LEVEL: ICD-10-CM

## 2021-06-22 DIAGNOSIS — I10 ESSENTIAL HYPERTENSION: ICD-10-CM

## 2021-06-22 RX ORDER — ALLOPURINOL 300 MG/1
TABLET ORAL
Qty: 90 TABLET | Refills: 0 | Status: SHIPPED
Start: 2021-06-22 | End: 2021-10-04 | Stop reason: SDUPTHER

## 2021-06-22 RX ORDER — FENOFIBRATE 160 MG/1
TABLET ORAL
Qty: 90 TABLET | Refills: 0 | Status: SHIPPED
Start: 2021-06-22 | End: 2021-10-04 | Stop reason: SDUPTHER

## 2021-06-22 RX ORDER — LOSARTAN POTASSIUM AND HYDROCHLOROTHIAZIDE 25; 100 MG/1; MG/1
TABLET ORAL
Qty: 90 TABLET | Refills: 0 | Status: SHIPPED
Start: 2021-06-22 | End: 2021-09-07 | Stop reason: SDUPTHER

## 2021-06-22 RX ORDER — PRAVASTATIN SODIUM 40 MG
TABLET ORAL
Qty: 90 TABLET | Refills: 0 | Status: SHIPPED
Start: 2021-06-22 | End: 2021-10-04 | Stop reason: SDUPTHER

## 2021-06-22 RX ORDER — METOPROLOL TARTRATE 50 MG/1
TABLET, FILM COATED ORAL
Qty: 180 TABLET | Refills: 0 | Status: SHIPPED
Start: 2021-06-22 | End: 2021-10-04 | Stop reason: SDUPTHER

## 2021-09-07 DIAGNOSIS — F32.A CHRONIC DEPRESSION: ICD-10-CM

## 2021-09-07 DIAGNOSIS — E03.9 HYPOTHYROIDISM, UNSPECIFIED TYPE: ICD-10-CM

## 2021-09-07 RX ORDER — LOSARTAN POTASSIUM AND HYDROCHLOROTHIAZIDE 25; 100 MG/1; MG/1
TABLET ORAL
Qty: 90 TABLET | Refills: 0 | Status: SHIPPED
Start: 2021-09-07 | End: 2021-12-06 | Stop reason: SDUPTHER

## 2021-09-07 RX ORDER — DULOXETIN HYDROCHLORIDE 60 MG/1
CAPSULE, DELAYED RELEASE ORAL
Qty: 90 CAPSULE | Refills: 0 | Status: SHIPPED
Start: 2021-09-07 | End: 2021-12-06 | Stop reason: SDUPTHER

## 2021-09-07 RX ORDER — LEVOTHYROXINE SODIUM 0.07 MG/1
TABLET ORAL
Qty: 90 TABLET | Refills: 0 | Status: SHIPPED
Start: 2021-09-07 | End: 2021-12-06 | Stop reason: SDUPTHER

## 2021-11-17 ENCOUNTER — APPOINTMENT (OUTPATIENT)
Dept: GENERAL RADIOLOGY | Age: 61
End: 2021-11-17
Payer: MEDICAID

## 2021-11-17 ENCOUNTER — HOSPITAL ENCOUNTER (EMERGENCY)
Age: 61
Discharge: HOME | End: 2021-11-17
Payer: MEDICAID

## 2021-11-17 VITALS
SYSTOLIC BLOOD PRESSURE: 132 MMHG | HEART RATE: 67 BPM | RESPIRATION RATE: 16 BRPM | WEIGHT: 218 LBS | BODY MASS INDEX: 38.62 KG/M2 | OXYGEN SATURATION: 97 % | TEMPERATURE: 96.6 F | DIASTOLIC BLOOD PRESSURE: 76 MMHG | HEIGHT: 63 IN

## 2021-11-17 DIAGNOSIS — S83.92XA SPRAIN OF LEFT KNEE, UNSPECIFIED LIGAMENT, INITIAL ENCOUNTER: Primary | ICD-10-CM

## 2021-11-17 PROCEDURE — 99283 EMERGENCY DEPT VISIT LOW MDM: CPT

## 2021-11-17 PROCEDURE — 73560 X-RAY EXAM OF KNEE 1 OR 2: CPT

## 2021-11-17 ASSESSMENT — PAIN DESCRIPTION - DESCRIPTORS: DESCRIPTORS: RADIATING;SHARP

## 2021-11-17 ASSESSMENT — PAIN DESCRIPTION - PAIN TYPE: TYPE: ACUTE PAIN

## 2021-11-17 ASSESSMENT — PAIN SCALES - GENERAL: PAINLEVEL_OUTOF10: 2

## 2021-11-17 ASSESSMENT — PAIN DESCRIPTION - PROGRESSION: CLINICAL_PROGRESSION: GRADUALLY WORSENING

## 2021-11-17 ASSESSMENT — PAIN DESCRIPTION - ORIENTATION: ORIENTATION: LEFT

## 2021-11-17 ASSESSMENT — PAIN DESCRIPTION - FREQUENCY: FREQUENCY: INTERMITTENT

## 2021-11-17 ASSESSMENT — PAIN DESCRIPTION - LOCATION: LOCATION: KNEE

## 2021-11-18 ENCOUNTER — NURSE TRIAGE (OUTPATIENT)
Dept: OTHER | Facility: CLINIC | Age: 61
End: 2021-11-18

## 2021-11-18 NOTE — TELEPHONE ENCOUNTER
Received call from [facility] at [facility] with Red Flag Complaint. Brief description of triage: Knee injury, see below    Triage indicates for patient to Go to [address] [place], [place] [address]r Ave or office with PCP approval.    2nd level triage attempted  with [facility]. This RN spoke with Yeimy Lafleur, and she said that the patient's provider was out on leave, and that the patient would have to proceed back to [facility] or ED. Care advice provided, patient verbalizes understanding; denies any other questions or concerns; instructed to call back for any new or worsening symptoms. Attention Provider: Thank you for allowing me to participate in the care of your patient. The patient was connected to triage in response to information provided to the ECC/PSC. Please do not respond through this encounter as the response is not directed to a shared pool. Reason for Disposition   Can't stand (bear weight) or walk    Answer Assessment - Initial Assessment Questions  1. MECHANISM: \"How did the injury happen? \" (e.g., twisting injury, direct blow)   Patient states she went to step down off her deck, and her knee gave out. Before that, a few weeks ago she squatted and heard a \"pop\" and it was excruciating pain, but went away. Then she was carrying a heavy bag and it hurt again. Went to ED yesterday, and all they did was an Xray, and she was told if she had more pain to go to her PCP. 2. ONSET: \"When did the injury happen? \" (Minutes or hours ago)   The initial injury was about 2.5 weeks ago. 3. LOCATION: \"Where is the injury located? \"       Left knee sides and back of knee especially hurt    4. APPEARANCE of INJURY: \"What does the injury look like? \"   Looks normal per patient        5. SEVERITY: \"Can you put weight on that leg? \" \"Can you walk? \"   Can't bear weight or walk, been using cruthces        6. SIZE: For cuts, bruises, or swelling, ask: \"How large is it? \" (e.g., inches or centimeters;  entire joint) Denies    7. PAIN: \"Is there pain? \" If so, ask: \"How bad is the pain? \"    (e.g., Scale 1-10; or mild, moderate, severe)  Right now 2/10 because she is sitting, more an ache. If she bears weight, she \"cannot even rate, it's over 10\"        8. TETANUS: For any breaks in the skin, ask: \"When was the last tetanus booster? \"      N/A    9. OTHER SYMPTOMS: \"Do you have any other symptoms? \"  (e.g., \"pop\" when knee injured, swelling, locking, buckling)       Prowers a pop when she squatted. Patient stated it buckled the day before yesterday. 10. PREGNANCY: \"Is there any chance you are pregnant? \" \"When was your last menstrual period? \"        N/A    Protocols used: KNEE INJURY-ADULT-[place]

## 2021-11-18 NOTE — ED PROVIDER NOTES
[facility]  Department of Emergency Medicine   ED  Encounter Note  Admit Date/RoomTime: 2021  4:48 PM  ED Room: [facility]/    NAME: Thompson Ennis  : 1960  MRN: [id number]     Chief Complaint:  Knee Injury (started yesterday morning, walking down steps and felt pain/ \"popping\" in her left knee)    History of Present Illness       Thompson Ennis is a 56 y.o. old female who presents to the emergency department by private vehicle, for non-traumatic crepitus sensation and stiffness to left knee  which occured several day(s) prior to arrival.  The complaint is due to walking up and down steps and possibly twisting the knee. Patient states that she felt a pop in her left knee while walking down steps. .  Since onset the symptoms have been stable. Patient  has a prior history of pain to mentioned area related to today's visit. Her pain is aggraveated by walking and relieved by nothing, as no treatment has been provided prior to this visit. Her weight bearing ability is: normal. She denies any head injury, headache, loss of consciousness, confusion, dizziness, neck pain or chest pain. Tetanus Status: up to date. ROS   Pertinent positives and negatives are stated within HPI, all other systems reviewed and are negative. Past Medical History:  has a past medical history of Angina, Anxiety attack, Arrhythmia, CAD (coronary artery disease), CAD (coronary artery disease), Chronic depression, Fatigue, Hyperlipidemia, Hypertension, Hypothyroidism, Obesity (BMI 35.0-39.9 without comorbidity), Osteoarthritis, Sleep apnea, Stage 1 mild COPD by GOLD classification ([address] Thyroid disease, and Uncontrolled daytime somnolence. Surgical History:  has a past surgical history that includes ovarian cyst removal (Left); Uterine fibroid surgery;  section; Abdominal adhesion surgery; Hysterectomy; Carpal tunnel release (Right); Carpal tunnel release (Left);  Finger trigger release (Right); and Finger trigger release (Left). Social History:  reports that she has been smoking cigarettes. She started smoking about 21 years ago. She has a 37.00 pack-year smoking history. She has never used smokeless tobacco. She reports current alcohol use. She reports that she does not use drugs. Family History: family history includes Cancer in her mother; Depression in her sister, sister, and sister; Diabetes in her paternal uncle; Heart Attack in her father; Heart Disease in her father and paternal uncle; High Blood Pressure in her brother, sister, sister, and sister; Hypertension in her father; No Known Problems in her brother and another family member; Stroke in her mother. Allergies: Biaxin [clarithromycin] and Levofloxacin    Physical Exam   Oxygen Saturation Interpretation: Normal.        ED Triage Vitals [11/17/21 1512]   BP Temp Temp Source Pulse Resp SpO2 Height Weight   132/76 96.6 °F (35.9 °C) Temporal 67 16 97 % 5' 3\" (1.6 m) 218 lb (98.9 kg)         Constitutional:  Alert, development consistent with age. Neck:  Normal ROM. Supple. Left Knee: medial, lateral            Tenderness:  mild. .              Swelling/Effusion: None. Deformity: no deformity observed/palpated. ROM: full range with pain. Skin:  tenderness. Drawer's:  Negative. Lachman's: Negative. Apley's: Negative. Magi's: Negative. Valgus/Varus Stress: Negative. Crepitus: Positive. Hip:            Tenderness:  none. Swelling: None. Deformity: no.              ROM: full range of motion. Skin:  no wounds, erythema, or swelling. Joint(s) Above/Below: hip and ankle. Tenderness:  none. Swelling: No.              Deformity: no deformity observed/palpated. ROM: full range of motion. Skin:  no wounds, erythema, or swelling. Neurovascular: Motor deficit: none. Sensory deficit: none. Pulse deficit: none. Capillary refill: normal.  Gait:  normal.  Lymphatics: No lymphangitis or adenopathy noted. Neurological:  Oriented. Motor functions intact. Lab / Imaging Results   (All laboratory and radiology results have been personally reviewed by myself)  Labs:  No results found for this visit on 11/17/21. Imaging: All Radiology results interpreted by Radiologist unless otherwise noted. XR KNEE LEFT (1-2 VIEWS)   Final Result   No definite radiographically visible acute skeletal pathology. ED Course / Medical Decision Making   Medications - No data to display     Consult(s):   None    Procedure(s):   none. MDM:     Imaging was obtained based on moderate suspicion for fracture or bony abnormality as per history/physical findings. Plan is subsequently for symptom control, limited use and  immobilization with appropriate outpatient follow-up. Plan of Care/Counseling:  BRICE Tavarez CNP reviewed today's visit with the patient in addition to providing specific details for the plan of care and counseling regarding the diagnosis and prognosis. Questions are answered at this time and are agreeable with the plan. Assessment      1. Sprain of left knee, unspecified ligament, initial encounter      Plan   Discharged home. Patient condition is good    New Medications     Discharge Medication List as of 11/17/2021  5:48 PM      START taking these medications    Details   diclofenac sodium (VOLTAREN) 1 % GEL Apply 4 g topically 4 times daily as needed for Pain, Topical, 4 TIMES DAILY PRN Starting Wed 11/17/2021, Disp-50 g, R-0, Normal           Electronically signed by BRICE Tavarez CNP   DD: 11/17/21  **This report was transcribed using voice recognition software.  Every effort was made to ensure accuracy; however, inadvertent computerized transcription errors may be present.   END OF ED PROVIDER NOTE       Asuncion Patino, BRICE - CNP  11/17/21 7167

## 2021-12-06 DIAGNOSIS — F32.A CHRONIC DEPRESSION: ICD-10-CM

## 2021-12-06 DIAGNOSIS — E03.9 HYPOTHYROIDISM, UNSPECIFIED TYPE: ICD-10-CM

## 2021-12-06 RX ORDER — LOSARTAN POTASSIUM AND HYDROCHLOROTHIAZIDE 25; 100 MG/1; MG/1
TABLET ORAL
Qty: 30 TABLET | Refills: 0 | Status: SHIPPED
Start: 2021-12-06 | End: 2022-01-02 | Stop reason: SDUPTHER

## 2021-12-06 RX ORDER — LEVOTHYROXINE SODIUM 0.07 MG/1
TABLET ORAL
Qty: 30 TABLET | Refills: 0 | Status: SHIPPED
Start: 2021-12-06 | End: 2022-01-02 | Stop reason: SDUPTHER

## 2021-12-06 RX ORDER — DULOXETIN HYDROCHLORIDE 60 MG/1
CAPSULE, DELAYED RELEASE ORAL
Qty: 30 CAPSULE | Refills: 0 | Status: SHIPPED
Start: 2021-12-06 | End: 2022-01-02 | Stop reason: SDUPTHER

## 2022-01-02 DIAGNOSIS — E79.0 ELEVATED BLOOD URIC ACID LEVEL: ICD-10-CM

## 2022-01-02 DIAGNOSIS — I49.9 CARDIAC ARRHYTHMIA, UNSPECIFIED CARDIAC ARRHYTHMIA TYPE: ICD-10-CM

## 2022-01-02 DIAGNOSIS — I10 ESSENTIAL HYPERTENSION: ICD-10-CM

## 2022-01-02 DIAGNOSIS — E78.2 MIXED HYPERLIPIDEMIA: ICD-10-CM

## 2022-01-03 RX ORDER — FENOFIBRATE 160 MG/1
TABLET ORAL
Qty: 90 TABLET | Refills: 0 | Status: SHIPPED
Start: 2022-01-03 | End: 2022-04-06 | Stop reason: SDUPTHER

## 2022-01-03 RX ORDER — ALLOPURINOL 300 MG/1
TABLET ORAL
Qty: 90 TABLET | Refills: 0 | Status: SHIPPED
Start: 2022-01-03 | End: 2022-04-06 | Stop reason: SDUPTHER

## 2022-01-03 RX ORDER — PRAVASTATIN SODIUM 40 MG
TABLET ORAL
Qty: 90 TABLET | Refills: 0 | Status: SHIPPED
Start: 2022-01-03 | End: 2022-04-06 | Stop reason: SDUPTHER

## 2022-01-03 RX ORDER — METOPROLOL TARTRATE 50 MG/1
50 TABLET, FILM COATED ORAL 2 TIMES DAILY
Qty: 180 TABLET | Refills: 0 | Status: SHIPPED
Start: 2022-01-03 | End: 2022-04-06 | Stop reason: SDUPTHER

## 2022-01-07 ENCOUNTER — NURSE TRIAGE (OUTPATIENT)
Dept: OTHER | Facility: CLINIC | Age: 62
End: 2022-01-07

## 2022-01-07 NOTE — TELEPHONE ENCOUNTER
Received call from   at [facility], caller not on line. Complaint:   Left knee pain and swelling injury   Oct 21     Market: [employer] Name: Nikos Orozco pc     Caller's telephone number verified as    Connected with caller via phone, please see below triage   Received call from [facility]  at [facility] with [employer] Complaint. Subjective: Caller states \"knee swelling \"     Current Symptoms:   Left knee pain   Onset: a few days ago;    Associated Symptoms:    pt reports she has  Skin discolored marks on the side of the knee with   pain   Decreased range of motion can flex the knee partially     Pt went to urgent care 11/17   11/15 pt states she went to  squat and heard a  pop , she had  sharp pain and was not  unable to bear weight went to ED on 17ths          Pain Severity: 9/10; throbbing; constant    Temperature: denies      What has been tried: Tylenol, analgesics     Recommended disposition: seen in office today       Care advice provided, patient verbalizes understanding; denies any other questions or concerns; instructed to call back for any new or worsening symptoms. Corey Gabriel at [facility] calling patient to schedule     Attention Provider: Thank you for allowing me to participate in the care of your patient. The patient was connected to triage in response to information provided to the ECC/PSC. Please do not respond through this encounter as the response is not directed to a shared pool.               Reason for Disposition   Very swollen knee joint    Protocols used: KNEE PAIN-ADULT-OH

## 2022-01-14 ENCOUNTER — OFFICE VISIT (OUTPATIENT)
Dept: PRIMARY CARE CLINIC | Age: 62
End: 2022-01-14
Payer: MEDICAID

## 2022-01-14 VITALS
TEMPERATURE: 97.1 F | WEIGHT: 217 LBS | HEART RATE: 81 BPM | OXYGEN SATURATION: 98 % | BODY MASS INDEX: 38.45 KG/M2 | SYSTOLIC BLOOD PRESSURE: 118 MMHG | HEIGHT: 63 IN | DIASTOLIC BLOOD PRESSURE: 72 MMHG | RESPIRATION RATE: 16 BRPM

## 2022-01-14 DIAGNOSIS — M25.562 PAIN AND SWELLING OF LEFT KNEE: ICD-10-CM

## 2022-01-14 DIAGNOSIS — M23.92 INTERNAL DERANGEMENT OF LEFT KNEE: Primary | ICD-10-CM

## 2022-01-14 DIAGNOSIS — M25.462 PAIN AND SWELLING OF LEFT KNEE: ICD-10-CM

## 2022-01-14 PROCEDURE — G8484 FLU IMMUNIZE NO ADMIN: HCPCS | Performed by: PHYSICIAN ASSISTANT

## 2022-01-14 PROCEDURE — 3017F COLORECTAL CA SCREEN DOC REV: CPT | Performed by: PHYSICIAN ASSISTANT

## 2022-01-14 PROCEDURE — 99213 OFFICE O/P EST LOW 20 MIN: CPT | Performed by: PHYSICIAN ASSISTANT

## 2022-01-14 PROCEDURE — 4004F PT TOBACCO SCREEN RCVD TLK: CPT | Performed by: PHYSICIAN ASSISTANT

## 2022-01-14 PROCEDURE — G8417 CALC BMI ABV UP PARAM F/U: HCPCS | Performed by: PHYSICIAN ASSISTANT

## 2022-01-14 PROCEDURE — G8427 DOCREV CUR MEDS BY ELIG CLIN: HCPCS | Performed by: PHYSICIAN ASSISTANT

## 2022-01-14 RX ORDER — METHYLPREDNISOLONE 4 MG/1
TABLET ORAL
Qty: 1 KIT | Refills: 0 | Status: SHIPPED | OUTPATIENT
Start: 2022-01-14 | End: 2022-01-20

## 2022-01-14 RX ORDER — ASCORBIC ACID 500 MG
TABLET ORAL
COMMUNITY

## 2022-01-14 RX ORDER — TRAMADOL HYDROCHLORIDE 50 MG/1
50-100 TABLET ORAL NIGHTLY PRN
Qty: 14 TABLET | Refills: 0 | Status: SHIPPED | OUTPATIENT
Start: 2022-01-14 | End: 2022-01-21

## 2022-01-14 NOTE — PROGRESS NOTES
Chief Complaint:   Joint Swelling (L knee)      History of Present Illness   Source of history provided by:  patient. History/Exam Limitations: none. Alfred Kim is a 59 y.o. old female who has a past medical history of:   Past Medical History:   Diagnosis Date    Angina     Anxiety attack     Arrhythmia     Symptomatic palpitatoins    CAD (coronary artery disease)     CAD (coronary artery disease)     Chronic depression     Fatigue     Hyperlipidemia     Hypertension     Hypothyroidism     Obesity (BMI 35.0-39.9 without comorbidity) 12/27/2016    Osteoarthritis     Sleep apnea     Stage 1 mild COPD by GOLD classification ([facility] [address] 6/4/2018    Thyroid disease     hypothyroidism    Uncontrolled daytime somnolence       Presents today with complaints of L knee pain. Notes started on oct 31, 2022, when she squatted and heard a pop, states however it got better. Then on November 15, 2022, was walking and took step down, and immediately felt a pop and gave out. And now she continues with use of a cane. Pain is worse with going upa nd down steps, and twisting it. The pain has not improved since the onset of symptoms. Per pt, there is mild swelling and moderate pain with ROM. Pt denies any N/T, calf pain/edema, foot/ankle pain, hip pain, back pain, abrasions, fever, chills, or any other symptoms. She admits to some swelling in the medial knee, and patella.  Taking     ROS    Unless otherwise stated in this report or unable to obtain because of the patient's clinical or mental status as evidenced by the medical record, this patients's positive and negative responses for Review of Systems, constitutional, psych, eyes, ENT, cardiovascular, respiratory, gastrointestinal, neurological, genitourinary, musculoskeletal, integument systems and systems related to the presenting problem are either stated in the preceding or were not pertinent or were negative for the symptoms and/or complaints related to the medical problem.[email or web address]. Past Medical History:   Past Surgical History:   Procedure Laterality Date    ABDOMINAL ADHESION SURGERY      and endometrioma removal    CARPAL TUNNEL RELEASE Right     Right [facility]  Dr. Christine Hsieh      x 2    FINGER TRIGGER RELEASE Right     Right trigger thumb release Dr. Al East Left     Left trigger thumb release Dr. Naa Garcia      and BSO    OVARIAN CYST REMOVAL Left     UTERINE FIBROID SURGERY       Social History:  reports that she has been smoking cigarettes. She started smoking about 21 years ago. She has a 37.00 pack-year smoking history. She has never used smokeless tobacco. She reports current alcohol use. She reports that she does not use drugs. Family History: family history includes Cancer in her mother; Depression in her sister, sister, and sister; Diabetes in her paternal uncle; Heart Attack in her father; Heart Disease in her father and paternal uncle; High Blood Pressure in her brother, sister, sister, and sister; Hypertension in her father; No Known Problems in her brother and another family member; Stroke in her mother. Allergies: Biaxin [clarithromycin] and Levofloxacin    Physical Exam         VS:  /72   Pulse 81   Temp 97.1 °F (36.2 °C)   Resp 16   Ht 5' 3\" (1.6 m)   Wt 217 lb (98.4 kg)   SpO2 98%   BMI 38.44 kg/m²     Oxygen Saturation Interpretation: Normal.    Constitutional:  Alert, development consistent with age. Neck:  Normal ROM. Supple. HEENT: Head NC/NT. Normal external ears. Eyes PERRL. Throat without erythema or exudate. Chest: Heart RRR without pathologic murmurs or gallops. Lungs CTA A and P without W/R/R. Knee: Inspection: L knee without obvious deformity.               Tenderness:  Mild TTP over medial knee              Swelling/Effusion: +edema and swelli niover medial knee and suprapatellar area              Deformity: No obvious deformity. ROM: ROM 0º-120º with mild to moderate discomfort. Skin:  no bruising noted. No abrasions, rashes, or erythema noted. Drawer's:  neg anterior/posterior drawer sign. Magi's: + Magi's sign. Valgus/Varus Stress: + Varus/Valgus stress sign. Crepitus: minimal crepitus noted with flexion and extension. Hip:            Tenderness:  No TTP.              ROM: FROM hip without pain. Joint(s) Below: thigh. Tenderness:  No TTP over calf, ankle, or foot without any edema. ROM: FROM without pain or deficits. Neurovascular:             Sensory deficit: Sensation intact above and below the injury site. Pulse deficit: Pulses 2+ and bounding. Capillary refill: Less then 2 sec throughout. Gait:  Antalgic gait, but able to bear weight with mild difficulty. Lymphatics: No lymphangitis or adenopathy noted. Neurological:  Alert and oriented. Motor functions intact. Lab / Imaging Results   (All laboratory and radiology results have been personally reviewed by myself)  Labs:      Imaging: All Radiology results interpreted by Radiologist unless otherwise noted. Assessment / Plan     Impression(s):  1. Internal derangement of left knee    2. Pain and swelling of left knee      Disposition:    Rx medrol and tramadol. Pt likely needs to have knee drained, referred to ortho. And also MRi ordered, I suspect a medial meniscal tear. Continue ice/heat, RICE. If Sx become severe go to ER.

## 2022-02-03 ENCOUNTER — HOSPITAL ENCOUNTER (OUTPATIENT)
Dept: MRI IMAGING | Age: 62
Discharge: HOME | End: 2022-02-05
Payer: MEDICAID

## 2022-02-03 DIAGNOSIS — M23.92 INTERNAL DERANGEMENT OF LEFT KNEE: ICD-10-CM

## 2022-02-03 DIAGNOSIS — M25.462 PAIN AND SWELLING OF LEFT KNEE: ICD-10-CM

## 2022-02-03 DIAGNOSIS — M25.562 PAIN AND SWELLING OF LEFT KNEE: ICD-10-CM

## 2022-02-03 PROCEDURE — 73721 MRI JNT OF LWR EXTRE W/O DYE: CPT

## 2022-04-06 DIAGNOSIS — E78.2 MIXED HYPERLIPIDEMIA: ICD-10-CM

## 2022-04-06 DIAGNOSIS — I49.9 CARDIAC ARRHYTHMIA, UNSPECIFIED CARDIAC ARRHYTHMIA TYPE: ICD-10-CM

## 2022-04-06 DIAGNOSIS — E79.0 ELEVATED BLOOD URIC ACID LEVEL: ICD-10-CM

## 2022-04-06 DIAGNOSIS — F32.A CHRONIC DEPRESSION: ICD-10-CM

## 2022-04-06 DIAGNOSIS — I10 ESSENTIAL HYPERTENSION: ICD-10-CM

## 2022-04-06 DIAGNOSIS — E03.9 HYPOTHYROIDISM, UNSPECIFIED TYPE: ICD-10-CM

## 2022-04-06 RX ORDER — DULOXETIN HYDROCHLORIDE 60 MG/1
CAPSULE, DELAYED RELEASE ORAL
Qty: 30 CAPSULE | Refills: 2 | Status: SHIPPED
Start: 2022-04-06 | End: 2022-06-06 | Stop reason: SDUPTHER

## 2022-04-06 RX ORDER — METOPROLOL TARTRATE 50 MG/1
50 TABLET, FILM COATED ORAL 2 TIMES DAILY
Qty: 180 TABLET | Refills: 0 | Status: SHIPPED
Start: 2022-04-06 | End: 2022-06-06 | Stop reason: SDUPTHER

## 2022-04-06 RX ORDER — PRAVASTATIN SODIUM 40 MG
TABLET ORAL
Qty: 90 TABLET | Refills: 0 | Status: SHIPPED
Start: 2022-04-06 | End: 2022-06-06 | Stop reason: SDUPTHER

## 2022-04-06 RX ORDER — LOSARTAN POTASSIUM AND HYDROCHLOROTHIAZIDE 25; 100 MG/1; MG/1
TABLET ORAL
Qty: 30 TABLET | Refills: 2 | Status: SHIPPED
Start: 2022-04-06 | End: 2022-06-06 | Stop reason: SDUPTHER

## 2022-04-06 RX ORDER — FENOFIBRATE 160 MG/1
TABLET ORAL
Qty: 90 TABLET | Refills: 0 | Status: SHIPPED
Start: 2022-04-06 | End: 2022-06-06 | Stop reason: SDUPTHER

## 2022-04-06 RX ORDER — ALLOPURINOL 300 MG/1
TABLET ORAL
Qty: 90 TABLET | Refills: 0 | Status: SHIPPED
Start: 2022-04-06 | End: 2022-06-06 | Stop reason: SDUPTHER

## 2022-04-06 RX ORDER — LEVOTHYROXINE SODIUM 0.07 MG/1
TABLET ORAL
Qty: 30 TABLET | Refills: 2 | Status: SHIPPED
Start: 2022-04-06 | End: 2022-06-06 | Stop reason: SDUPTHER

## 2022-06-06 DIAGNOSIS — I10 ESSENTIAL HYPERTENSION: ICD-10-CM

## 2022-06-06 DIAGNOSIS — L40.1 PUSTULAR PSORIASIS: ICD-10-CM

## 2022-06-06 DIAGNOSIS — E78.2 MIXED HYPERLIPIDEMIA: ICD-10-CM

## 2022-06-06 DIAGNOSIS — F32.A CHRONIC DEPRESSION: ICD-10-CM

## 2022-06-06 DIAGNOSIS — E79.0 ELEVATED BLOOD URIC ACID LEVEL: ICD-10-CM

## 2022-06-06 DIAGNOSIS — I49.9 CARDIAC ARRHYTHMIA, UNSPECIFIED CARDIAC ARRHYTHMIA TYPE: ICD-10-CM

## 2022-06-06 DIAGNOSIS — B35.6 TINEA CRURIS: ICD-10-CM

## 2022-06-06 DIAGNOSIS — E03.9 HYPOTHYROIDISM, UNSPECIFIED TYPE: ICD-10-CM

## 2022-06-06 RX ORDER — METOPROLOL TARTRATE 50 MG/1
50 TABLET, FILM COATED ORAL 2 TIMES DAILY
Qty: 180 TABLET | Refills: 0 | Status: SHIPPED | OUTPATIENT
Start: 2022-06-06 | End: 2022-10-03 | Stop reason: SDUPTHER

## 2022-06-06 RX ORDER — LOSARTAN POTASSIUM AND HYDROCHLOROTHIAZIDE 25; 100 MG/1; MG/1
TABLET ORAL
Qty: 30 TABLET | Refills: 2 | Status: SHIPPED | OUTPATIENT
Start: 2022-06-06 | End: 2022-10-03 | Stop reason: SDUPTHER

## 2022-06-06 RX ORDER — DULOXETIN HYDROCHLORIDE 60 MG/1
CAPSULE, DELAYED RELEASE ORAL
Qty: 30 CAPSULE | Refills: 2 | Status: SHIPPED | OUTPATIENT
Start: 2022-06-06 | End: 2022-10-03 | Stop reason: SDUPTHER

## 2022-06-06 RX ORDER — LEVOTHYROXINE SODIUM 0.07 MG/1
TABLET ORAL
Qty: 30 TABLET | Refills: 2 | Status: SHIPPED | OUTPATIENT
Start: 2022-06-06 | End: 2022-07-05 | Stop reason: SDUPTHER

## 2022-06-06 RX ORDER — PRAVASTATIN SODIUM 40 MG
TABLET ORAL
Qty: 90 TABLET | Refills: 0 | Status: SHIPPED | OUTPATIENT
Start: 2022-06-06 | End: 2022-10-03 | Stop reason: SDUPTHER

## 2022-06-06 RX ORDER — ALLOPURINOL 300 MG/1
TABLET ORAL
Qty: 90 TABLET | Refills: 0 | Status: SHIPPED | OUTPATIENT
Start: 2022-06-06 | End: 2022-10-03 | Stop reason: SDUPTHER

## 2022-06-06 RX ORDER — CLOBETASOL PROPIONATE 0.5 MG/G
CREAM TOPICAL 2 TIMES DAILY PRN
Qty: 30 G | Refills: 3 | Status: SHIPPED | OUTPATIENT
Start: 2022-06-06

## 2022-06-06 RX ORDER — NYSTATIN 100000 [USP'U]/G
POWDER TOPICAL
Qty: 30 G | Refills: 1 | Status: SHIPPED | OUTPATIENT
Start: 2022-06-06

## 2022-06-06 RX ORDER — FENOFIBRATE 160 MG/1
TABLET ORAL
Qty: 90 TABLET | Refills: 0 | Status: SHIPPED | OUTPATIENT
Start: 2022-06-06 | End: 2022-10-03 | Stop reason: SDUPTHER

## 2022-07-05 DIAGNOSIS — E03.9 HYPOTHYROIDISM, UNSPECIFIED TYPE: ICD-10-CM

## 2022-07-05 RX ORDER — LEVOTHYROXINE SODIUM 0.07 MG/1
TABLET ORAL
Qty: 30 TABLET | Refills: 2 | Status: SHIPPED
Start: 2022-07-05 | End: 2022-10-03 | Stop reason: SDUPTHER

## 2022-10-03 ENCOUNTER — OFFICE VISIT (OUTPATIENT)
Dept: PRIMARY CARE CLINIC | Age: 62
End: 2022-10-03
Payer: MEDICAID

## 2022-10-03 VITALS
WEIGHT: 210 LBS | SYSTOLIC BLOOD PRESSURE: 122 MMHG | DIASTOLIC BLOOD PRESSURE: 80 MMHG | BODY MASS INDEX: 37.2 KG/M2 | HEART RATE: 71 BPM | OXYGEN SATURATION: 97 % | TEMPERATURE: 97.2 F

## 2022-10-03 DIAGNOSIS — Z12.31 BREAST CANCER SCREENING BY MAMMOGRAM: ICD-10-CM

## 2022-10-03 DIAGNOSIS — I10 ESSENTIAL HYPERTENSION: ICD-10-CM

## 2022-10-03 DIAGNOSIS — R73.01 IFG (IMPAIRED FASTING GLUCOSE): ICD-10-CM

## 2022-10-03 DIAGNOSIS — F32.A CHRONIC DEPRESSION: ICD-10-CM

## 2022-10-03 DIAGNOSIS — Z12.39 ENCOUNTER FOR SCREENING FOR MALIGNANT NEOPLASM OF BREAST, UNSPECIFIED SCREENING MODALITY: Primary | ICD-10-CM

## 2022-10-03 DIAGNOSIS — J44.9 STAGE 1 MILD COPD BY GOLD CLASSIFICATION (HCC): ICD-10-CM

## 2022-10-03 DIAGNOSIS — Z12.11 COLON CANCER SCREENING: ICD-10-CM

## 2022-10-03 DIAGNOSIS — E79.0 ELEVATED BLOOD URIC ACID LEVEL: ICD-10-CM

## 2022-10-03 DIAGNOSIS — E03.9 HYPOTHYROIDISM, UNSPECIFIED TYPE: ICD-10-CM

## 2022-10-03 DIAGNOSIS — E78.2 MIXED HYPERLIPIDEMIA: ICD-10-CM

## 2022-10-03 DIAGNOSIS — I49.9 CARDIAC ARRHYTHMIA, UNSPECIFIED CARDIAC ARRHYTHMIA TYPE: ICD-10-CM

## 2022-10-03 LAB
ALBUMIN SERPL-MCNC: 4.1 G/DL (ref 3.5–5.2)
ALP BLD-CCNC: 61 U/L (ref 35–104)
ALT SERPL-CCNC: 21 U/L (ref 0–32)
ANION GAP SERPL CALCULATED.3IONS-SCNC: 8 MMOL/L (ref 7–16)
AST SERPL-CCNC: 23 U/L (ref 0–31)
BILIRUB SERPL-MCNC: 0.4 MG/DL (ref 0–1.2)
BUN BLDV-MCNC: 13 MG/DL (ref 6–23)
CALCIUM SERPL-MCNC: 9.6 MG/DL (ref 8.6–10.2)
CHLORIDE BLD-SCNC: 101 MMOL/L (ref 98–107)
CHOLESTEROL, TOTAL: 124 MG/DL (ref 0–199)
CO2: 32 MMOL/L (ref 22–29)
CREAT SERPL-MCNC: 0.9 MG/DL (ref 0.5–1)
GFR AFRICAN AMERICAN: >60
GFR NON-AFRICAN AMERICAN: >60 ML/MIN/1.73
GLUCOSE BLD-MCNC: 93 MG/DL (ref 74–99)
HBA1C MFR BLD: 5.6 % (ref 4–5.6)
HCT VFR BLD CALC: 39.8 % (ref 34–48)
HDLC SERPL-MCNC: 37 MG/DL
HEMOGLOBIN: 12.7 G/DL (ref 11.5–15.5)
LDL CHOLESTEROL CALCULATED: 68 MG/DL (ref 0–99)
MCH RBC QN AUTO: 29.5 PG (ref 26–35)
MCHC RBC AUTO-ENTMCNC: 31.9 % (ref 32–34.5)
MCV RBC AUTO: 92.3 FL (ref 80–99.9)
PDW BLD-RTO: 13.2 FL (ref 11.5–15)
PLATELET # BLD: 157 E9/L (ref 130–450)
PMV BLD AUTO: 12.6 FL (ref 7–12)
POTASSIUM SERPL-SCNC: 3.6 MMOL/L (ref 3.5–5)
RBC # BLD: 4.31 E12/L (ref 3.5–5.5)
SODIUM BLD-SCNC: 141 MMOL/L (ref 132–146)
TOTAL PROTEIN: 7.6 G/DL (ref 6.4–8.3)
TRIGL SERPL-MCNC: 97 MG/DL (ref 0–149)
TSH SERPL DL<=0.05 MIU/L-ACNC: 2.95 UIU/ML (ref 0.27–4.2)
VLDLC SERPL CALC-MCNC: 19 MG/DL
WBC # BLD: 7.1 E9/L (ref 4.5–11.5)

## 2022-10-03 PROCEDURE — 3023F SPIROM DOC REV: CPT | Performed by: INTERNAL MEDICINE

## 2022-10-03 PROCEDURE — G8484 FLU IMMUNIZE NO ADMIN: HCPCS | Performed by: INTERNAL MEDICINE

## 2022-10-03 PROCEDURE — 3017F COLORECTAL CA SCREEN DOC REV: CPT | Performed by: INTERNAL MEDICINE

## 2022-10-03 PROCEDURE — 4004F PT TOBACCO SCREEN RCVD TLK: CPT | Performed by: INTERNAL MEDICINE

## 2022-10-03 PROCEDURE — G8427 DOCREV CUR MEDS BY ELIG CLIN: HCPCS | Performed by: INTERNAL MEDICINE

## 2022-10-03 PROCEDURE — 99214 OFFICE O/P EST MOD 30 MIN: CPT | Performed by: INTERNAL MEDICINE

## 2022-10-03 PROCEDURE — G8417 CALC BMI ABV UP PARAM F/U: HCPCS | Performed by: INTERNAL MEDICINE

## 2022-10-03 RX ORDER — FENOFIBRATE 160 MG/1
TABLET ORAL
Qty: 90 TABLET | Refills: 2 | Status: SHIPPED | OUTPATIENT
Start: 2022-10-03

## 2022-10-03 RX ORDER — LOSARTAN POTASSIUM AND HYDROCHLOROTHIAZIDE 25; 100 MG/1; MG/1
TABLET ORAL
Qty: 90 TABLET | Refills: 2 | Status: SHIPPED | OUTPATIENT
Start: 2022-10-03

## 2022-10-03 RX ORDER — DULOXETIN HYDROCHLORIDE 60 MG/1
CAPSULE, DELAYED RELEASE ORAL
Qty: 90 CAPSULE | Refills: 2 | Status: SHIPPED | OUTPATIENT
Start: 2022-10-03

## 2022-10-03 RX ORDER — ALLOPURINOL 300 MG/1
TABLET ORAL
Qty: 90 TABLET | Refills: 1 | Status: SHIPPED | OUTPATIENT
Start: 2022-10-03

## 2022-10-03 RX ORDER — LEVOTHYROXINE SODIUM 0.07 MG/1
TABLET ORAL
Qty: 90 TABLET | Refills: 2 | Status: SHIPPED | OUTPATIENT
Start: 2022-10-03

## 2022-10-03 RX ORDER — PRAVASTATIN SODIUM 40 MG
TABLET ORAL
Qty: 90 TABLET | Refills: 1 | Status: SHIPPED | OUTPATIENT
Start: 2022-10-03

## 2022-10-03 RX ORDER — METOPROLOL TARTRATE 50 MG/1
50 TABLET, FILM COATED ORAL 2 TIMES DAILY
Qty: 180 TABLET | Refills: 1 | Status: SHIPPED | OUTPATIENT
Start: 2022-10-03

## 2022-10-03 SDOH — ECONOMIC STABILITY: FOOD INSECURITY: WITHIN THE PAST 12 MONTHS, THE FOOD YOU BOUGHT JUST DIDN'T LAST AND YOU DIDN'T HAVE MONEY TO GET MORE.: NEVER TRUE

## 2022-10-03 SDOH — ECONOMIC STABILITY: FOOD INSECURITY: WITHIN THE PAST 12 MONTHS, YOU WORRIED THAT YOUR FOOD WOULD RUN OUT BEFORE YOU GOT MONEY TO BUY MORE.: NEVER TRUE

## 2022-10-03 ASSESSMENT — SOCIAL DETERMINANTS OF HEALTH (SDOH): HOW HARD IS IT FOR YOU TO PAY FOR THE VERY BASICS LIKE FOOD, HOUSING, MEDICAL CARE, AND HEATING?: NOT HARD AT ALL

## 2022-10-03 NOTE — PROGRESS NOTES
10/3/22    Autumn Ramos, a female of 59 y.o. presents today for Hypothyroidism, Established New Doctor (New to provider), Hypertension, and Blood Work (Drawn check up)    Medications include allopurinol duloxetine fenofibrate ketoconazole levothyroxine losartan/hydrochlorothiazide Pravachol Carafate    Her medical history includes obesity sleep apnea hypercholesterolemia hypothyroidism essential hypertension CAD Gout    She follows with Dr. Kelly Hsieh    Surgical history  Hysterectomy  C section  Trigger thumb  Carpal tunnel    Family history  CAD  Depression   HTN  Psoriasis    Tobacco  8 cigs / day  40 years    Alcohol  None    She does have some aches and pains. She denies any issues or side effects with medications. she feels well overall today and denies any physical complaints. She denies chest pain shortness of breath palpitations or edema. /80   Pulse 71   Temp 97.2 °F (36.2 °C)   Wt 210 lb (95.3 kg)   SpO2 97%   BMI 37.20 kg/m²     Review of Systems  Constitutional:Negative for activity change, appetite change, chills, fatigue and fever. Respiratory: Negative for choking, chest tightness, shortness of breath and wheezing. Cardiovascular: Negative for chest pain, palpitations and leg swelling. Gastrointestinal: Negative for abdominal distention, constipation, diarrhea, nausea and vomiting. Musculoskeletal: Negative for arthralgias, back pain, gait problem and joint swelling. Neurological: Negative for dizziness, weakness,numbness and headaches.      Patient Active Problem List    Diagnosis Date Noted    Stage 1 mild COPD by GOLD classification ([facility] [address] 06/04/2018    Cigarette nicotine dependence without complication 25/99/3074    Elevated blood uric acid level 12/27/2016    Mixed hyperlipidemia 12/27/2016    Essential hypertension 12/27/2016    DARRIN on CPAP 12/27/2016    Acquired hypothyroidism 12/27/2016    Chronic fatigue 12/27/2016    Obesity (BMI 35.0-39.9 without comorbidity) 12/27/2016    Chronic depression 06/27/2016    CAD (coronary artery disease) 06/27/2016    Angina pectoris ([facility] [address]     Arrhythmia     Hypothyroidism     Hyperlipidemia      Allergies   Allergen Reactions    Biaxin [Clarithromycin] Other (See Comments)     psychosis    Levofloxacin Other (See Comments)     Patient unsure reaction     Current Outpatient Medications on File Prior to Visit   Medication Sig Dispense Refill    nystatin (MYCOSTATIN) 840651 UNIT/GM powder Apply 3 times daily. 30 g 1    clobetasol (TEMOVATE) 0.05 % cream Apply topically 2 times daily as needed (psoriasis) For psoriasis 30 g 3    Ascorbic Acid (VITAMIN C WITH RICARDO HIPS) 500 MG tablet       diclofenac sodium (VOLTAREN) 1 % GEL Apply 4 g topically 4 times daily as needed for Pain 50 g 0    ketoconazole (NIZORAL) 2 % cream Apply topically daily. 30 g 1    Glucosamine-Chondroitin (GLUCOSAMINE CHONDR COMPLEX PO) Take by mouth 2 times daily      clotrimazole-betamethasone (LOTRISONE) 1-0.05 % cream Apply topically 2 times daily. (Patient taking differently: as needed Apply topically 2 times daily.) 30 g 1    clobetasol (TEMOVATE) 0.05 % ointment Apply 1 each topically 2 times daily Apply topically 2 times daily. (Patient taking differently: Apply 1 each topically as needed Apply topically 2 times daily.) 45 g 5    sucralfate (CARAFATE) 1 GM tablet Take 1 tablet by mouth 4 times daily as needed (GERD) 120 tablet 1    albuterol sulfate HFA (VENTOLIN HFA) 108 (90 Base) MCG/ACT inhaler Inhale 2 puffs into the lungs every 6 hours as needed for Wheezing 1 Inhaler 5    multivitamin (THERAGRAN) per tablet Take 1 tablet by mouth daily. Cholecalciferol (VITAMIN D) 2000 UNITS TABS Take 1,000 Units by mouth daily. No current facility-administered medications on file prior to visit. Physical Exam   Constitutional:  Oriented to person, place, and time. Appears well-developed and well-nourished.  No acute distress. HENT: No sinus tenderness or lymphadenopathy  Head: Normocephalic and atraumatic. Eyes: Eyes exhibits no discharge. No scleral icterus present. Neck: No tracheal deviation present. No thyromegaly present. Cardiovascular: Normal rate, regular rhythm, normal heart sounds and intact distal pulses. Exam reveals no gallop nor friction rub. No murmur heard. Pulmonary: Effort normal and breath sounds normal. No respiratory distress. No wheezes or rales. Abdomen: No signs of rigidity rebound or organomegaly  Musculoskeletal:  No tenderness to palpation  Neurological:Alert and oriented to person, place, and time. Skin: No diaphoresis. Psychiatric: Normal mood and affect. Behavior is Normal.     ASSESSMENT AND PLAN:    Assessment  Diagnoses and all orders for this visit:  Encounter for screening for malignant neoplasm of breast, unspecified screening modality  -     [facility] DIGITAL SCREEN W OR WO CAD BILATERAL; Future  Mixed hyperlipidemia  -     pravastatin (PRAVACHOL) 40 MG tablet; 1 po qd q hs  -     fenofibrate (TRIGLIDE) 160 MG tablet; 1 po qd  -     CBC; Future  -     Comprehensive Metabolic Panel; Future  -     Lipid Panel; Future  Essential hypertension  -     metoprolol tartrate (LOPRESSOR) 50 MG tablet; Take 1 tablet by mouth 2 times daily  Cardiac arrhythmia, unspecified cardiac arrhythmia type  -     metoprolol tartrate (LOPRESSOR) 50 MG tablet; Take 1 tablet by mouth 2 times daily  Hypothyroidism, unspecified type  -     levothyroxine (EUTHYROX) 75 MCG tablet; Take 1 tablet by mouth once daily  -     TSH; Future  Chronic depression  -     DULoxetine (CYMBALTA) 60 MG extended release capsule;  Take 1 tablet daily  Elevated blood uric acid level  -     allopurinol (ZYLOPRIM) 300 MG tablet; 1 po qd  Colon cancer screening  -     Fecal DNA Colorectal cancer screening (Cologuard)  Breast cancer screening by mammogram  IFG (impaired fasting glucose)  -     Hemoglobin A1C; Future  Stage 1 mild COPD by GOLD classification ([facility]  Other orders  -     losartan-hydroCHLOROthiazide (HYZAAR) 100-25 MG per tablet; Take 1 tablet daily      Plan    Continue current medication regimen  Obtain routine blood work  Obtain mammogram  Obtain Cologuard testing     Return in about 6 months (around 4/3/2023).     Electronically signed by William Zavala DO on 10/3/2022 at 1:16 PM    William Zavala DO

## 2023-02-15 ENCOUNTER — HOSPITAL ENCOUNTER (EMERGENCY)
Age: 63
Discharge: HOME | DRG: 463 | End: 2023-02-15
Attending: EMERGENCY MEDICINE
Payer: MEDICAID

## 2023-02-15 ENCOUNTER — APPOINTMENT (OUTPATIENT)
Dept: CT IMAGING | Age: 63
DRG: 463 | End: 2023-02-15
Payer: MEDICAID

## 2023-02-15 VITALS
HEART RATE: 70 BPM | BODY MASS INDEX: 38.45 KG/M2 | DIASTOLIC BLOOD PRESSURE: 59 MMHG | OXYGEN SATURATION: 97 % | SYSTOLIC BLOOD PRESSURE: 104 MMHG | WEIGHT: 217 LBS | HEIGHT: 63 IN | RESPIRATION RATE: 15 BRPM | TEMPERATURE: 97.1 F

## 2023-02-15 DIAGNOSIS — N20.0 KIDNEY STONE: Primary | ICD-10-CM

## 2023-02-15 LAB
ALBUMIN SERPL-MCNC: 4 G/DL (ref 3.5–5.2)
ALP BLD-CCNC: 65 U/L (ref 35–104)
ALT SERPL-CCNC: 20 U/L (ref 0–32)
ANION GAP SERPL CALCULATED.3IONS-SCNC: 11 MMOL/L (ref 7–16)
AST SERPL-CCNC: 22 U/L (ref 0–31)
BASOPHILS ABSOLUTE: 0.05 E9/L (ref 0–0.2)
BASOPHILS RELATIVE PERCENT: 0.6 % (ref 0–2)
BILIRUB SERPL-MCNC: 0.3 MG/DL (ref 0–1.2)
BUN BLDV-MCNC: 15 MG/DL (ref 6–23)
CALCIUM SERPL-MCNC: 9.8 MG/DL (ref 8.6–10.2)
CHLORIDE BLD-SCNC: 101 MMOL/L (ref 98–107)
CO2: 27 MMOL/L (ref 22–29)
CREAT SERPL-MCNC: 0.8 MG/DL (ref 0.5–1)
EOSINOPHILS ABSOLUTE: 0.32 E9/L (ref 0.05–0.5)
EOSINOPHILS RELATIVE PERCENT: 3.5 % (ref 0–6)
GFR SERPL CREATININE-BSD FRML MDRD: >60 ML/MIN/1.73
GLUCOSE BLD-MCNC: 128 MG/DL (ref 74–99)
HCT VFR BLD CALC: 39.6 % (ref 34–48)
HEMOGLOBIN: 12.9 G/DL (ref 11.5–15.5)
IMMATURE GRANULOCYTES #: 0.03 E9/L
IMMATURE GRANULOCYTES %: 0.3 % (ref 0–5)
LACTIC ACID: 1.3 MMOL/L (ref 0.5–2.2)
LIPASE: 46 U/L (ref 13–60)
LYMPHOCYTES ABSOLUTE: 2.41 E9/L (ref 1.5–4)
LYMPHOCYTES RELATIVE PERCENT: 26.7 % (ref 20–42)
MCH RBC QN AUTO: 29.4 PG (ref 26–35)
MCHC RBC AUTO-ENTMCNC: 32.6 % (ref 32–34.5)
MCV RBC AUTO: 90.2 FL (ref 80–99.9)
MONOCYTES ABSOLUTE: 0.44 E9/L (ref 0.1–0.95)
MONOCYTES RELATIVE PERCENT: 4.9 % (ref 2–12)
NEUTROPHILS ABSOLUTE: 5.79 E9/L (ref 1.8–7.3)
NEUTROPHILS RELATIVE PERCENT: 64 % (ref 43–80)
PDW BLD-RTO: 12.8 FL (ref 11.5–15)
PLATELET # BLD: 196 E9/L (ref 130–450)
PMV BLD AUTO: 11.8 FL (ref 7–12)
POTASSIUM SERPL-SCNC: 3.6 MMOL/L (ref 3.5–5)
RBC # BLD: 4.39 E12/L (ref 3.5–5.5)
SODIUM BLD-SCNC: 139 MMOL/L (ref 132–146)
TOTAL PROTEIN: 7.4 G/DL (ref 6.4–8.3)
WBC # BLD: 9 E9/L (ref 4.5–11.5)

## 2023-02-15 PROCEDURE — 6360000002 HC RX W HCPCS: Performed by: EMERGENCY MEDICINE

## 2023-02-15 PROCEDURE — 74176 CT ABD & PELVIS W/O CONTRAST: CPT

## 2023-02-15 PROCEDURE — 83690 ASSAY OF LIPASE: CPT

## 2023-02-15 PROCEDURE — 99284 EMERGENCY DEPT VISIT MOD MDM: CPT

## 2023-02-15 PROCEDURE — 83605 ASSAY OF LACTIC ACID: CPT

## 2023-02-15 PROCEDURE — 96374 THER/PROPH/DIAG INJ IV PUSH: CPT

## 2023-02-15 PROCEDURE — 80053 COMPREHEN METABOLIC PANEL: CPT

## 2023-02-15 PROCEDURE — 96375 TX/PRO/DX INJ NEW DRUG ADDON: CPT

## 2023-02-15 PROCEDURE — 2580000003 HC RX 258: Performed by: EMERGENCY MEDICINE

## 2023-02-15 PROCEDURE — 85025 COMPLETE CBC W/AUTO DIFF WBC: CPT

## 2023-02-15 RX ORDER — TAMSULOSIN HYDROCHLORIDE 0.4 MG/1
0.4 CAPSULE ORAL DAILY
Qty: 10 CAPSULE | Refills: 0 | Status: SHIPPED | OUTPATIENT
Start: 2023-02-15 | End: 2023-02-25

## 2023-02-15 RX ORDER — ONDANSETRON 2 MG/ML
4 INJECTION INTRAMUSCULAR; INTRAVENOUS ONCE
Status: COMPLETED | OUTPATIENT
Start: 2023-02-15 | End: 2023-02-15

## 2023-02-15 RX ORDER — 0.9 % SODIUM CHLORIDE 0.9 %
1000 INTRAVENOUS SOLUTION INTRAVENOUS ONCE
Status: COMPLETED | OUTPATIENT
Start: 2023-02-15 | End: 2023-02-15

## 2023-02-15 RX ORDER — KETOROLAC TROMETHAMINE 30 MG/ML
15 INJECTION, SOLUTION INTRAMUSCULAR; INTRAVENOUS ONCE
Status: COMPLETED | OUTPATIENT
Start: 2023-02-15 | End: 2023-02-15

## 2023-02-15 RX ORDER — ONDANSETRON 4 MG/1
4 TABLET, ORALLY DISINTEGRATING ORAL 3 TIMES DAILY PRN
Qty: 30 TABLET | Refills: 0 | Status: SHIPPED | OUTPATIENT
Start: 2023-02-15

## 2023-02-15 RX ORDER — HYDROCODONE BITARTRATE AND ACETAMINOPHEN 5; 325 MG/1; MG/1
1 TABLET ORAL EVERY 4 HOURS PRN
Qty: 18 TABLET | Refills: 0 | Status: SHIPPED | OUTPATIENT
Start: 2023-02-15 | End: 2023-02-16 | Stop reason: ALTCHOICE

## 2023-02-15 RX ORDER — MORPHINE SULFATE 4 MG/ML
4 INJECTION, SOLUTION INTRAMUSCULAR; INTRAVENOUS
Status: COMPLETED | OUTPATIENT
Start: 2023-02-15 | End: 2023-02-15

## 2023-02-15 RX ADMIN — ONDANSETRON 4 MG: 2 INJECTION INTRAMUSCULAR; INTRAVENOUS at 09:31

## 2023-02-15 RX ADMIN — KETOROLAC TROMETHAMINE 15 MG: 30 INJECTION, SOLUTION INTRAMUSCULAR; INTRAVENOUS at 09:31

## 2023-02-15 RX ADMIN — MORPHINE SULFATE 4 MG: 4 INJECTION, SOLUTION INTRAMUSCULAR; INTRAVENOUS at 09:31

## 2023-02-15 RX ADMIN — SODIUM CHLORIDE 1000 ML: 9 INJECTION, SOLUTION INTRAVENOUS at 09:29

## 2023-02-15 ASSESSMENT — PAIN DESCRIPTION - PAIN TYPE: TYPE: ACUTE PAIN

## 2023-02-15 ASSESSMENT — PAIN DESCRIPTION - FREQUENCY: FREQUENCY: CONTINUOUS

## 2023-02-15 ASSESSMENT — PAIN SCALES - GENERAL
PAINLEVEL_OUTOF10: 10
PAINLEVEL_OUTOF10: 3

## 2023-02-15 ASSESSMENT — PAIN - FUNCTIONAL ASSESSMENT: PAIN_FUNCTIONAL_ASSESSMENT: 0-10

## 2023-02-15 ASSESSMENT — PAIN DESCRIPTION - ORIENTATION: ORIENTATION: LEFT;LOWER

## 2023-02-15 ASSESSMENT — PAIN DESCRIPTION - LOCATION: LOCATION: ABDOMEN

## 2023-02-15 NOTE — ED NOTES
Pt transfer to CT scan via stretcher,tele intact.  remains at bedside.      Elidia Reyes RN  02/15/23 0043

## 2023-02-15 NOTE — ED NOTES
States LLQ pain since last evening. Pain radiating to left flank area this am. Positive blood tinge urine. Instructed on the need for a sample. N/V associated with pain, per pt. Pt. appears uncomfortable. Pain 9/10, stabbing in nature. Medicated per orders. IV NS 1 liter infusing bolus.  at bedside.      Aruna Acosta RN  02/15/23 [id number]

## 2023-02-15 NOTE — ED PROVIDER NOTES
[facility] ENCOUNTER        Pt Name: Tess Heard  MRN: [id number]  [place] 1960  Date of evaluation: 2/15/2023  Provider: Helen Bentley DO  PCP: Mali Balderas DO  Note Started: 9:02 AM EST 2/15/23    CHIEF COMPLAINT       Chief Complaint   Patient presents with    Abdominal Pain     LLQ, hematuria        HISTORY OF PRESENT ILLNESS: 1 or more Elements        Limitations to history : None    Tess Heard is a 57 y.o. female who presents with left lateral mid abdominal pain and hematuria that began this morning. Patient states that she was uncomfortable throughout the night, noticed blood in her urine today. She states that the pain worsened and she had nausea with vomiting. She denies any diarrhea, no recent fevers. Nursing Notes were all reviewed and agreed with or any disagreements were addressed in the HPI. REVIEW OF EXTERNAL NOTE :       None    REVIEW OF SYSTEMS :      Positives and Pertinent negatives as per HPI.      SURGICAL HISTORY     Past Surgical History:   Procedure Laterality Date    ABDOMINAL ADHESION SURGERY      and endometrioma removal    CARPAL TUNNEL RELEASE Right     Right CTR  Dr. Ej Benton      x 2    FINGER TRIGGER RELEASE Right     Right trigger thumb release Dr. Arndt Grad trigger thumb release Dr. Misha Garcia ([address] [place] [address])      and BSO    OVARIAN CYST REMOVAL Left     UTERINE FIBROID SURGERY         CURRENTMEDICATIONS       Discharge Medication List as of 2/15/2023 10:24 AM        CONTINUE these medications which have NOT CHANGED    Details   pravastatin (PRAVACHOL) 40 MG tablet 1 po qd q hs, Disp-90 tablet, R-1Normal      metoprolol tartrate (LOPRESSOR) 50 MG tablet Take 1 tablet by mouth 2 times daily, Disp-180 tablet, R-1Normal      losartan-hydroCHLOROthiazide (HYZAAR) 100-25 MG per tablet Take 1 tablet daily, Disp-90 tablet, R-2Normal      levothyroxine (EUTHYROX) 75 MCG tablet Take 1 tablet by mouth once daily, Disp-90 tablet, R-2Normal      fenofibrate (TRIGLIDE) 160 MG tablet 1 po qd, Disp-90 tablet, R-2Normal      DULoxetine (CYMBALTA) 60 MG extended release capsule Take 1 tablet daily, Disp-90 capsule, R-2Normal      allopurinol (ZYLOPRIM) 300 MG tablet 1 po qd, Disp-90 tablet, R-1Normal      nystatin (MYCOSTATIN) 765659 UNIT/GM powder Apply 3 times daily. , Disp-30 g, R-1, Normal      clobetasol (TEMOVATE) 0.05 % cream Apply topically 2 times daily as needed (psoriasis) For psoriasis, Topical, 2 TIMES DAILY PRN Starting Mon 6/6/2022, Disp-30 g, R-3, Normal      Ascorbic Acid (VITAMIN C WITH RICARDO HIPS) 500 MG tablet Historical Med      diclofenac sodium (VOLTAREN) 1 % GEL Apply 4 g topically 4 times daily as needed for Pain, Topical, 4 TIMES DAILY PRN Starting Wed 11/17/2021, Disp-50 g, R-0, Normal      ketoconazole (NIZORAL) 2 % cream Apply topically daily. , Disp-30 g, R-1, Normal      Glucosamine-Chondroitin (GLUCOSAMINE CHONDR COMPLEX PO) Take by mouth 2 times dailyHistorical Med      clotrimazole-betamethasone (LOTRISONE) 1-0.05 % cream Apply topically 2 times daily. , Disp-30 g, R-1, Normal      clobetasol (TEMOVATE) 0.05 % ointment Apply 1 each topically 2 times daily Apply topically 2 times daily. , Topical, 2 TIMES DAILY Starting Tue 7/10/2018, Disp-45 g, R-5, Normal      sucralfate (CARAFATE) 1 GM tablet Take 1 tablet by mouth 4 times daily as needed (GERD), Disp-120 tablet, R-1Normal      albuterol sulfate HFA (VENTOLIN HFA) 108 (90 Base) MCG/ACT inhaler Inhale 2 puffs into the lungs every 6 hours as needed for Wheezing, Disp-1 Inhaler, R-5Normal      multivitamin (THERAGRAN) per tablet Take 1 tablet by mouth daily. Cholecalciferol (VITAMIN D) 2000 UNITS TABS Take 1,000 Units by mouth daily. ALLERGIES     Biaxin [clarithromycin] and Levofloxacin    FAMILYHISTORY       Family History   Problem Relation Age of Onset    Heart Attack Father     Hypertension Father     Heart Disease Father     Stroke Mother     Cancer Mother     Heart Disease Paternal Uncle     Diabetes Paternal Uncle     No Known Problems Other     High Blood Pressure Sister     Depression Sister     High Blood Pressure Brother     Depression Sister     High Blood Pressure Sister     Depression Sister     High Blood Pressure Sister     No Known Problems Brother         SOCIAL HISTORY       Social History     Tobacco Use    Smoking status: Every Day     Packs/day: 1.00     Years: 37.00     Pack years: 37.00     Types: Cigarettes     Start date: 7/9/2000    Smokeless tobacco: Never    Tobacco comments:     smokes between 6-8 cigarettes a day   Vaping Use    Vaping Use: Never used   Substance Use Topics    Alcohol use: Yes     Comment: glass of wine very rarely    Drug use: No       SCREENINGS        Jessica Coma Scale  Eye Opening: Spontaneous  Best Verbal Response: Oriented  Best Motor Response: Obeys commands  Jessica Coma Scale Score: 15                CIWA Assessment  BP: (!) 104/59  Heart Rate: 70           PHYSICAL EXAM  1 or more Elements     ED Triage Vitals [02/15/23 0852]   BP Temp Temp src Heart Rate Resp SpO2 Height Weight   136/61 97.1 °F (36.2 °C) -- 68 18 98 % 5' 3\" (1.6 m) 217 lb (98.4 kg)           Constitutional/General: Alert and oriented x3  Respiratory: Lungs clear to auscultation bilaterally, no wheezes, rales, or rhonchi. Not in respiratory distress  Cardiovascular:  Regular rate. Regular rhythm. No murmurs, no gallops, no rubs. 2+ distal pulses. Equal extremity pulses. GI:  Abdomen Soft, tender left mid abdomen, Non distended. +BS. No rebound, guarding, or rigidity. No pulsatile masses. Integument: skin warm and dry. No rashes.    Neurologic: GCS 15          DIAGNOSTIC RESULTS   LABS:    Labs Reviewed COMPREHENSIVE METABOLIC PANEL - Abnormal; Notable for the following components:       Result Value    Glucose 128 (*)     All other components within normal limits   CBC WITH AUTO DIFFERENTIAL   LIPASE   LACTIC ACID   URINALYSIS       As interpreted by me, the above displayed labs are abnormal. All other labs obtained during this visit were within normal range or not returned as of this dictation. RADIOLOGY:   Non-plain film images such as CT, Ultrasound and MRI are read by the radiologist. Plain radiographic images are visualized and preliminarily interpreted by the ED Provider with the findings documented in the ED Course. Interpretation per the Radiologist below, if available at the time of this note:    CT ABDOMEN PELVIS WO CONTRAST Additional Contrast? None   Final Result   8 mm mildly obstructing stone at the left UPJ. There is distension seen in   the left renal collecting system. Multiple smaller nonobstructing stones   seen throughout the kidneys bilaterally. No results found. No results found. PROCEDURES   Unless otherwise noted below, none       CRITICAL CARE TIME (.cct)   None    PAST MEDICAL HISTORY/Chronic Conditions Affecting Care      has a past medical history of Angina, Anxiety attack, Arrhythmia, CAD (coronary artery disease), CAD (coronary artery disease), Chronic depression, Fatigue, Hyperlipidemia, Hypertension, Hypothyroidism, Obesity (BMI 35.0-39.9 without comorbidity) (12/27/2016), Osteoarthritis, Sleep apnea, Stage 1 mild COPD by GOLD classification ([facility] [address] (6/4/2018), Thyroid disease, and Uncontrolled daytime somnolence.      EMERGENCY DEPARTMENT COURSE    Vitals:    Vitals:    02/15/23 1020 02/15/23 1030 02/15/23 1040 02/15/23 1050   BP:   (!) 104/59    Pulse: 75 78 64 70   Resp: 19 21 11 15   Temp:       SpO2: 95% 94% 96% 97%   Weight:       Height:           Patient was given the following medications:  Medications   0.9 % sodium chloride bolus (0 mLs IntraVENous Stopped 2/15/23 1202)   ondansetron (ZOFRAN) injection 4 mg (4 mg IntraVENous Given 2/15/23 0931)   morphine sulfate (PF) injection 4 mg (4 mg IntraVENous Given 2/15/23 0931)   ketorolac (TORADOL) injection 15 mg (15 mg IntraVENous Given 2/15/23 0931)             Medical Decision Making/Differential Diagnosis:    CC/HPI Summary, Social Determinants of health, Records Reviewed, DDx, testing done/not done, ED Course, Reassessment, disposition considerations/shared decision making with patient, consults, disposition:             Medical Decision Making  68-year-old female presenting with a left mid abdominal pain and blood in the urine. Would have concern for kidney stone, lower suspicion for urinary infection but possible. Would also consider diverticulitis but with the hematuria this is lower suspicion. She arrives hemodynamically stable and well-appearing, is slightly tender in the left mid abdomen. No leukocytosis or anemia. Metabolic panel is within acceptable limits, normal kidney function. LFTs unremarkable, lipase normal.  Lactic acid 1.3. CT imaging shows an 8 mm mildly obstructing stone at the left UPJ. Patient was given 4 mg IV Zofran, 4 mg IV morphine, 15 mg IV Toradol with 1 L normal saline. She is feeling significantly better after medication. Discussion with patient regarding the size of stone but only mildly obstructing. As she has no leukocytosis or lactic acidosis, she feels comfortable with discharge and follow-up to urology knowing that she may need urologic intervention to pass the stone. She will be provided Norco, Zofran and Flomax prescriptions. Instruction to return for any changes in condition or new symptoms. Problems Addressed:  Kidney stone: acute illness or injury    Amount and/or Complexity of Data Reviewed  Labs: ordered. Decision-making details documented in ED Course. Radiology: ordered. Decision-making details documented in ED Course.     Risk  Prescription drug management. CBC is ordered to evaluate for any signs of infection or inflammation by obtaining a WBC count, or any signs of acute anemia by interpreting hemoglobin. CMP was ordered to evaluate for any electrolyte imbalances, kidney function, or any elevations in anion gap. Urinalysis ordered to evaluate for UTI as the possible cause of symptoms, and may also evaluate hematuria as well. CONSULTS: (Who and What was discussed)  None        I am the Primary Clinician of Record. FINAL IMPRESSION      1. Kidney stone          DISPOSITION/PLAN     DISPOSITION Decision To Discharge 02/15/2023 10:22:53 AM      PATIENT REFERRED TO:  Merced Galarza MD  [address]  [phone]    Schedule an appointment as soon as possible for a visit       DISCHARGE MEDICATIONS:  Discharge Medication List as of 2/15/2023 10:24 AM        START taking these medications    Details   HYDROcodone-acetaminophen (NORCO) 5-325 MG per tablet Take 1 tablet by mouth every 4 hours as needed for Pain for up to 3 days. Intended supply: 3 days.  Take lowest dose possible to manage pain Max Daily Amount: 6 tablets, Disp-18 tablet, R-0Normal      tamsulosin (FLOMAX) 0.4 MG capsule Take 1 capsule by mouth daily for 10 days, Disp-10 capsule, R-0Normal      ondansetron (ZOFRAN-ODT) 4 MG disintegrating tablet Take 1 tablet by mouth 3 times daily as needed for Nausea or Vomiting, Disp-30 tablet, R-0Normal             DISCONTINUED MEDICATIONS:  Discharge Medication List as of 2/15/2023 10:24 AM                 (Please note that portions of this note were completed with a voice recognition program.  Efforts were made to edit the dictations but occasionally words are mis-transcribed.)    Marie Rojas DO (electronically signed)            Marie Rojas DO  02/15/23 69 Katy Oliveira DO  02/15/23 [id number]

## 2023-02-16 ENCOUNTER — HOSPITAL ENCOUNTER (INPATIENT)
Age: 63
LOS: 2 days | Discharge: HOME | DRG: 463 | End: 2023-02-18
Attending: EMERGENCY MEDICINE | Admitting: STUDENT IN AN ORGANIZED HEALTH CARE EDUCATION/TRAINING PROGRAM
Payer: MEDICAID

## 2023-02-16 ENCOUNTER — ANESTHESIA EVENT (OUTPATIENT)
Dept: OPERATING ROOM | Age: 63
End: 2023-02-16
Payer: MEDICAID

## 2023-02-16 DIAGNOSIS — N17.9 AKI (ACUTE KIDNEY INJURY) (HCC): ICD-10-CM

## 2023-02-16 DIAGNOSIS — N20.0 KIDNEY STONE: Primary | ICD-10-CM

## 2023-02-16 DIAGNOSIS — N20.1 URETERIC STONE: ICD-10-CM

## 2023-02-16 DIAGNOSIS — L20.9 ATOPIC DERMATITIS, UNSPECIFIED TYPE: ICD-10-CM

## 2023-02-16 LAB
ALBUMIN SERPL-MCNC: 3.8 G/DL (ref 3.5–5.2)
ALP BLD-CCNC: 56 U/L (ref 35–104)
ALT SERPL-CCNC: 22 U/L (ref 0–32)
ANION GAP SERPL CALCULATED.3IONS-SCNC: 11 MMOL/L (ref 7–16)
AST SERPL-CCNC: 23 U/L (ref 0–31)
BACTERIA: ABNORMAL /HPF
BASOPHILS ABSOLUTE: 0.05 E9/L (ref 0–0.2)
BASOPHILS RELATIVE PERCENT: 0.5 % (ref 0–2)
BILIRUB SERPL-MCNC: 0.4 MG/DL (ref 0–1.2)
BILIRUBIN URINE: NEGATIVE
BLOOD, URINE: ABNORMAL
BUN BLDV-MCNC: 18 MG/DL (ref 6–23)
CALCIUM SERPL-MCNC: 9.3 MG/DL (ref 8.6–10.2)
CHLORIDE BLD-SCNC: 102 MMOL/L (ref 98–107)
CLARITY: ABNORMAL
CO2: 27 MMOL/L (ref 22–29)
COLOR: YELLOW
CREAT SERPL-MCNC: 1.2 MG/DL (ref 0.5–1)
EOSINOPHILS ABSOLUTE: 0.12 E9/L (ref 0.05–0.5)
EOSINOPHILS RELATIVE PERCENT: 1.2 % (ref 0–6)
EPITHELIAL CELLS, UA: ABNORMAL /HPF
GFR SERPL CREATININE-BSD FRML MDRD: 50 ML/MIN/1.73
GLUCOSE BLD-MCNC: 134 MG/DL (ref 74–99)
GLUCOSE URINE: NEGATIVE MG/DL
HCT VFR BLD CALC: 36.9 % (ref 34–48)
HEMOGLOBIN: 11.8 G/DL (ref 11.5–15.5)
IMMATURE GRANULOCYTES #: 0.03 E9/L
IMMATURE GRANULOCYTES %: 0.3 % (ref 0–5)
KETONES, URINE: NEGATIVE MG/DL
LACTIC ACID: 1.5 MMOL/L (ref 0.5–2.2)
LEUKOCYTE ESTERASE, URINE: ABNORMAL
LIPASE: 29 U/L (ref 13–60)
LYMPHOCYTES ABSOLUTE: 1.97 E9/L (ref 1.5–4)
LYMPHOCYTES RELATIVE PERCENT: 19.3 % (ref 20–42)
MCH RBC QN AUTO: 29.1 PG (ref 26–35)
MCHC RBC AUTO-ENTMCNC: 32 % (ref 32–34.5)
MCV RBC AUTO: 91.1 FL (ref 80–99.9)
MONOCYTES ABSOLUTE: 0.56 E9/L (ref 0.1–0.95)
MONOCYTES RELATIVE PERCENT: 5.5 % (ref 2–12)
NEUTROPHILS ABSOLUTE: 7.48 E9/L (ref 1.8–7.3)
NEUTROPHILS RELATIVE PERCENT: 73.2 % (ref 43–80)
NITRITE, URINE: NEGATIVE
PDW BLD-RTO: 12.9 FL (ref 11.5–15)
PH UA: 6.5 (ref 5–9)
PLATELET # BLD: 130 E9/L (ref 130–450)
PMV BLD AUTO: 12.9 FL (ref 7–12)
POTASSIUM SERPL-SCNC: 3.5 MMOL/L (ref 3.5–5)
PROTEIN UA: NEGATIVE MG/DL
RBC # BLD: 4.05 E12/L (ref 3.5–5.5)
RBC UA: >20 /HPF (ref 0–2)
SODIUM BLD-SCNC: 140 MMOL/L (ref 132–146)
SPECIFIC GRAVITY UA: 1.02 (ref 1–1.03)
TOTAL PROTEIN: 7 G/DL (ref 6.4–8.3)
UROBILINOGEN, URINE: 0.2 E.U./DL
WBC # BLD: 10.2 E9/L (ref 4.5–11.5)
WBC UA: ABNORMAL /HPF (ref 0–5)

## 2023-02-16 PROCEDURE — 99285 EMERGENCY DEPT VISIT HI MDM: CPT

## 2023-02-16 PROCEDURE — 6360000002 HC RX W HCPCS: Performed by: NURSE PRACTITIONER

## 2023-02-16 PROCEDURE — 1200000000 HC SEMI PRIVATE

## 2023-02-16 PROCEDURE — 85025 COMPLETE CBC W/AUTO DIFF WBC: CPT

## 2023-02-16 PROCEDURE — 81001 URINALYSIS AUTO W/SCOPE: CPT

## 2023-02-16 PROCEDURE — 6370000000 HC RX 637 (ALT 250 FOR IP): Performed by: NURSE PRACTITIONER

## 2023-02-16 PROCEDURE — 96375 TX/PRO/DX INJ NEW DRUG ADDON: CPT

## 2023-02-16 PROCEDURE — 83690 ASSAY OF LIPASE: CPT

## 2023-02-16 PROCEDURE — 83605 ASSAY OF LACTIC ACID: CPT

## 2023-02-16 PROCEDURE — 96374 THER/PROPH/DIAG INJ IV PUSH: CPT

## 2023-02-16 PROCEDURE — APPSS45 APP SPLIT SHARED TIME 31-45 MINUTES: Performed by: NURSE PRACTITIONER

## 2023-02-16 PROCEDURE — 80053 COMPREHEN METABOLIC PANEL: CPT

## 2023-02-16 PROCEDURE — 99222 1ST HOSP IP/OBS MODERATE 55: CPT | Performed by: STUDENT IN AN ORGANIZED HEALTH CARE EDUCATION/TRAINING PROGRAM

## 2023-02-16 PROCEDURE — 2580000003 HC RX 258: Performed by: NURSE PRACTITIONER

## 2023-02-16 PROCEDURE — 6360000002 HC RX W HCPCS

## 2023-02-16 PROCEDURE — 87088 URINE BACTERIA CULTURE: CPT

## 2023-02-16 PROCEDURE — 2580000003 HC RX 258

## 2023-02-16 PROCEDURE — 6360000002 HC RX W HCPCS: Performed by: EMERGENCY MEDICINE

## 2023-02-16 RX ORDER — 0.9 % SODIUM CHLORIDE 0.9 %
1000 INTRAVENOUS SOLUTION INTRAVENOUS ONCE
Status: COMPLETED | OUTPATIENT
Start: 2023-02-16 | End: 2023-02-16

## 2023-02-16 RX ORDER — SODIUM CHLORIDE 9 MG/ML
INJECTION, SOLUTION INTRAVENOUS CONTINUOUS
Status: DISCONTINUED | OUTPATIENT
Start: 2023-02-16 | End: 2023-02-18 | Stop reason: HOSPADM

## 2023-02-16 RX ORDER — FENOFIBRATE 160 MG/1
160 TABLET ORAL DAILY
Status: DISCONTINUED | OUTPATIENT
Start: 2023-02-16 | End: 2023-02-18 | Stop reason: HOSPADM

## 2023-02-16 RX ORDER — HYDROCODONE BITARTRATE AND ACETAMINOPHEN 5; 325 MG/1; MG/1
1 TABLET ORAL EVERY 6 HOURS PRN
Status: DISCONTINUED | OUTPATIENT
Start: 2023-02-16 | End: 2023-02-18 | Stop reason: HOSPADM

## 2023-02-16 RX ORDER — SODIUM CHLORIDE 9 MG/ML
INJECTION, SOLUTION INTRAVENOUS PRN
Status: DISCONTINUED | OUTPATIENT
Start: 2023-02-16 | End: 2023-02-18 | Stop reason: HOSPADM

## 2023-02-16 RX ORDER — NICOTINE 21 MG/24HR
1 PATCH, TRANSDERMAL 24 HOURS TRANSDERMAL DAILY
Status: DISCONTINUED | OUTPATIENT
Start: 2023-02-16 | End: 2023-02-18 | Stop reason: HOSPADM

## 2023-02-16 RX ORDER — TAMSULOSIN HYDROCHLORIDE 0.4 MG/1
0.4 CAPSULE ORAL DAILY
Status: DISCONTINUED | OUTPATIENT
Start: 2023-02-16 | End: 2023-02-18 | Stop reason: HOSPADM

## 2023-02-16 RX ORDER — MORPHINE SULFATE 2 MG/ML
2 INJECTION, SOLUTION INTRAMUSCULAR; INTRAVENOUS
Status: DISCONTINUED | OUTPATIENT
Start: 2023-02-16 | End: 2023-02-18 | Stop reason: HOSPADM

## 2023-02-16 RX ORDER — KETOROLAC TROMETHAMINE 30 MG/ML
15 INJECTION, SOLUTION INTRAMUSCULAR; INTRAVENOUS ONCE
Status: COMPLETED | OUTPATIENT
Start: 2023-02-16 | End: 2023-02-16

## 2023-02-16 RX ORDER — PRAVASTATIN SODIUM 20 MG
40 TABLET ORAL NIGHTLY
Status: DISCONTINUED | OUTPATIENT
Start: 2023-02-16 | End: 2023-02-18 | Stop reason: HOSPADM

## 2023-02-16 RX ORDER — ONDANSETRON 2 MG/ML
4 INJECTION INTRAMUSCULAR; INTRAVENOUS EVERY 6 HOURS PRN
Status: DISCONTINUED | OUTPATIENT
Start: 2023-02-16 | End: 2023-02-16

## 2023-02-16 RX ORDER — POLYETHYLENE GLYCOL 3350 17 G/17G
17 POWDER, FOR SOLUTION ORAL DAILY PRN
Status: DISCONTINUED | OUTPATIENT
Start: 2023-02-16 | End: 2023-02-18 | Stop reason: HOSPADM

## 2023-02-16 RX ORDER — SODIUM CHLORIDE 0.9 % (FLUSH) 0.9 %
5-40 SYRINGE (ML) INJECTION EVERY 12 HOURS SCHEDULED
Status: DISCONTINUED | OUTPATIENT
Start: 2023-02-16 | End: 2023-02-18 | Stop reason: HOSPADM

## 2023-02-16 RX ORDER — ACETAMINOPHEN 650 MG/1
650 SUPPOSITORY RECTAL EVERY 6 HOURS PRN
Status: DISCONTINUED | OUTPATIENT
Start: 2023-02-16 | End: 2023-02-18 | Stop reason: HOSPADM

## 2023-02-16 RX ORDER — ONDANSETRON 4 MG/1
4 TABLET, ORALLY DISINTEGRATING ORAL EVERY 8 HOURS PRN
Status: DISCONTINUED | OUTPATIENT
Start: 2023-02-16 | End: 2023-02-18 | Stop reason: HOSPADM

## 2023-02-16 RX ORDER — ONDANSETRON 2 MG/ML
4 INJECTION INTRAMUSCULAR; INTRAVENOUS ONCE
Status: COMPLETED | OUTPATIENT
Start: 2023-02-16 | End: 2023-02-16

## 2023-02-16 RX ORDER — METOPROLOL TARTRATE 50 MG/1
50 TABLET, FILM COATED ORAL 2 TIMES DAILY
Status: DISCONTINUED | OUTPATIENT
Start: 2023-02-16 | End: 2023-02-18 | Stop reason: HOSPADM

## 2023-02-16 RX ORDER — SODIUM CHLORIDE 0.9 % (FLUSH) 0.9 %
5-40 SYRINGE (ML) INJECTION PRN
Status: DISCONTINUED | OUTPATIENT
Start: 2023-02-16 | End: 2023-02-18 | Stop reason: HOSPADM

## 2023-02-16 RX ORDER — KETOROLAC TROMETHAMINE 30 MG/ML
15 INJECTION, SOLUTION INTRAMUSCULAR; INTRAVENOUS EVERY 6 HOURS PRN
Status: DISCONTINUED | OUTPATIENT
Start: 2023-02-16 | End: 2023-02-18 | Stop reason: HOSPADM

## 2023-02-16 RX ORDER — LEVOTHYROXINE SODIUM 0.07 MG/1
75 TABLET ORAL DAILY
Status: DISCONTINUED | OUTPATIENT
Start: 2023-02-16 | End: 2023-02-18 | Stop reason: HOSPADM

## 2023-02-16 RX ORDER — ACETAMINOPHEN 325 MG/1
650 TABLET ORAL EVERY 6 HOURS PRN
Status: DISCONTINUED | OUTPATIENT
Start: 2023-02-16 | End: 2023-02-18 | Stop reason: HOSPADM

## 2023-02-16 RX ORDER — ALLOPURINOL 300 MG/1
300 TABLET ORAL DAILY
Status: DISCONTINUED | OUTPATIENT
Start: 2023-02-16 | End: 2023-02-18 | Stop reason: HOSPADM

## 2023-02-16 RX ORDER — DULOXETIN HYDROCHLORIDE 60 MG/1
60 CAPSULE, DELAYED RELEASE ORAL DAILY
Status: DISCONTINUED | OUTPATIENT
Start: 2023-02-16 | End: 2023-02-18 | Stop reason: HOSPADM

## 2023-02-16 RX ORDER — ONDANSETRON 2 MG/ML
4 INJECTION INTRAMUSCULAR; INTRAVENOUS EVERY 6 HOURS PRN
Status: DISCONTINUED | OUTPATIENT
Start: 2023-02-16 | End: 2023-02-18 | Stop reason: HOSPADM

## 2023-02-16 RX ADMIN — ONDANSETRON 4 MG: 2 INJECTION INTRAMUSCULAR; INTRAVENOUS at 09:13

## 2023-02-16 RX ADMIN — SODIUM CHLORIDE: 9 INJECTION, SOLUTION INTRAVENOUS at 20:31

## 2023-02-16 RX ADMIN — METOPROLOL TARTRATE 50 MG: 50 TABLET ORAL at 20:32

## 2023-02-16 RX ADMIN — MORPHINE SULFATE 2 MG: 2 INJECTION, SOLUTION INTRAMUSCULAR; INTRAVENOUS at 17:55

## 2023-02-16 RX ADMIN — SODIUM CHLORIDE: 9 INJECTION, SOLUTION INTRAVENOUS at 09:12

## 2023-02-16 RX ADMIN — MORPHINE SULFATE 2 MG: 2 INJECTION, SOLUTION INTRAMUSCULAR; INTRAVENOUS at 11:24

## 2023-02-16 RX ADMIN — SODIUM CHLORIDE, PRESERVATIVE FREE 10 ML: 5 INJECTION INTRAVENOUS at 20:31

## 2023-02-16 RX ADMIN — PRAVASTATIN SODIUM 40 MG: 20 TABLET ORAL at 20:32

## 2023-02-16 RX ADMIN — KETOROLAC TROMETHAMINE 15 MG: 30 INJECTION, SOLUTION INTRAMUSCULAR; INTRAVENOUS at 05:41

## 2023-02-16 RX ADMIN — SODIUM CHLORIDE 1000 ML: 9 INJECTION, SOLUTION INTRAVENOUS at 05:56

## 2023-02-16 RX ADMIN — ONDANSETRON 4 MG: 2 INJECTION INTRAMUSCULAR; INTRAVENOUS at 19:19

## 2023-02-16 RX ADMIN — ONDANSETRON 4 MG: 2 INJECTION INTRAMUSCULAR; INTRAVENOUS at 05:41

## 2023-02-16 RX ADMIN — SODIUM CHLORIDE, PRESERVATIVE FREE 1000 MG: 5 INJECTION INTRAVENOUS at 13:07

## 2023-02-16 RX ADMIN — MORPHINE SULFATE 2 MG: 2 INJECTION, SOLUTION INTRAMUSCULAR; INTRAVENOUS at 20:29

## 2023-02-16 RX ADMIN — HYDROMORPHONE HYDROCHLORIDE 0.5 MG: 1 INJECTION, SOLUTION INTRAMUSCULAR; INTRAVENOUS; SUBCUTANEOUS at 05:42

## 2023-02-16 ASSESSMENT — LIFESTYLE VARIABLES
HOW MANY STANDARD DRINKS CONTAINING ALCOHOL DO YOU HAVE ON A TYPICAL DAY: PATIENT DOES NOT DRINK
HOW OFTEN DO YOU HAVE A DRINK CONTAINING ALCOHOL: MONTHLY OR LESS
SMOKING_STATUS: 1

## 2023-02-16 ASSESSMENT — PAIN SCALES - GENERAL
PAINLEVEL_OUTOF10: 8
PAINLEVEL_OUTOF10: 7
PAINLEVEL_OUTOF10: 0
PAINLEVEL_OUTOF10: 6
PAINLEVEL_OUTOF10: 2
PAINLEVEL_OUTOF10: 8
PAINLEVEL_OUTOF10: 9

## 2023-02-16 ASSESSMENT — PAIN DESCRIPTION - PAIN TYPE: TYPE: ACUTE PAIN

## 2023-02-16 ASSESSMENT — PAIN DESCRIPTION - ORIENTATION
ORIENTATION: LEFT

## 2023-02-16 ASSESSMENT — PAIN DESCRIPTION - DESCRIPTORS
DESCRIPTORS: ACHING;STABBING;THROBBING
DESCRIPTORS: ACHING;DISCOMFORT

## 2023-02-16 ASSESSMENT — PAIN - FUNCTIONAL ASSESSMENT
PAIN_FUNCTIONAL_ASSESSMENT: PREVENTS OR INTERFERES SOME ACTIVE ACTIVITIES AND ADLS
PAIN_FUNCTIONAL_ASSESSMENT: ACTIVITIES ARE NOT PREVENTED

## 2023-02-16 ASSESSMENT — PAIN DESCRIPTION - ONSET: ONSET: ON-GOING

## 2023-02-16 ASSESSMENT — PAIN DESCRIPTION - LOCATION
LOCATION: ABDOMEN

## 2023-02-16 ASSESSMENT — ENCOUNTER SYMPTOMS: DYSPNEA ACTIVITY LEVEL: NO INTERVAL CHANGE

## 2023-02-16 ASSESSMENT — COPD QUESTIONNAIRES: CAT_SEVERITY: MODERATE

## 2023-02-16 ASSESSMENT — PAIN DESCRIPTION - FREQUENCY: FREQUENCY: CONTINUOUS

## 2023-02-16 NOTE — ED PROVIDER NOTES
58-year-old female who is seen in the emergency department yesterday morning for the concerns of left flank pain. She had extensive work-up and was found to have a 8 mm mildly obstructing stone at the left UV PJ. Patient was given Toradol morphine fluids and Zofran to control the symptoms. There was shared decision making and the patient decided to go home with urology follow-up and she understood to come back to emergency department if symptoms are worsening. She was prescribed Norco and Zofran and Flomax. She comes in today for concerns of urinary retention. She says she has not had a full stream of urine since the morning. She states that her urine is dribbling. She also reports that there is no symptomatic relief from [address] and the pain is getting worse. She reports similar pain symptoms in the left flank that occasionally migrates to the groin. Describes pain as a sharp that is colicky. Patient reported about 3 episode of emesis. She is able to keep food and water down. She had 1 dose of Zofran but did not believe it is helping her symptoms. Review of Systems   Pert ROS stated in the HPI above. Physical Exam  Constitutional:       Appearance: Normal appearance. HENT:      Head: Normocephalic and atraumatic. Eyes:      Extraocular Movements: Extraocular movements intact. Pupils: Pupils are equal, round, and reactive to light. Cardiovascular:      Rate and Rhythm: Normal rate and regular rhythm. Abdominal:      Tenderness: There is no left CVA tenderness. Hernia: No hernia is present. Comments: Left flank and llq tenderness. Musculoskeletal:      Cervical back: Normal range of motion and neck supple. Neurological:      General: No focal deficit present. Mental Status: She is alert. Mental status is at baseline. Procedures         MDM       58-year-old female who is seen in the emergency department yesterday morning for the concerns of left flank pain. She had extensive work-up and was found to have a 8 mm mildly obstructing stone at the left UV PJ. Patient was given Toradol morphine fluids and Zofran to control the symptoms. There was shared decision making and the patient decided to go home with urology follow-up and she understood to come back to emergency department if symptoms are worsening. She was prescribed Norco and Zofran and Flomax. She comes in today for concerns of urinary retention. She says she has not had a full stream of urine since the morning. She states that her urine is dribbling. She also reports that there is no symptomatic relief from [address] and the pain is getting worse. She reports similar pain symptoms in the left flank that occasionally migrates to the groin. Describes pain as a sharp that is colicky. Patient reported about 3 episode of emesis. She is able to keep food and water down. She had 1 dose of Zofran but did not believe it is helping her symptoms. Upon entering the room the patient is hemodynamic stable she is afebrile she is in no acute distress. She is not tachycardic. There is tenderness to the left flank and left lower quadrant. Otherwise abdomen is soft no guarding no rigidity no firmness no tenderness. She denies GI bleed denies chest pain denies shortness of breath denies headache denies fever denies chills denies cough. Patient has not reached out to urology after she was discharge. Please see ED course below on commentary about labs. She required mult doses of pain medicine and zofran. She is given 1 liters of fluid. Urology agreeed to admitt the patient and will see them. M impression is kidney stone that is causing ERICKA. I considered dissection howeever pulses, and neuro exam are intact. She has no chest pain. With her recent diagnosis and symptomatology a kidney stone is more accurate. I considered pyleonephities however she is afrebril, no wbc and UA is clear.     I also considred diverticulitis, but aferile, no wc and no GI bleed wit recent CT scan suggestng kidney stone etiology it is higlhy nlikely she is haivng diverticulitis     I considered cauda equina however she has no saddle anesthesia, no leg pain, no truma and no FND on exam. NO point spinal tenderenss. ED Course as of 02/16/23 2047   Thu Feb 16, 2023   3253 External note from ED visit yesterday  . No leukocytosis or anemia. Metabolic panel is within acceptable limits, normal kidney function. LFTs unremarkable, lipase normal.  Lactic acid 1.3. CT imaging shows an 8 mm mildly obstructing stone at the left UPJ. Patient was given 4 mg IV Zofran, 4 mg IV morphine, 15 mg IV Toradol with 1 L normal saline. She is feeling significantly better after medication. Discussion with patient regarding the size of stone but only mildly obstructing. As she has no leukocytosis or lactic acidosis, she feels comfortable with discharge and follow-up to urology knowing that she may need urologic intervention to pass the stone. She will be provided Norco, Zofran and Flomax prescriptions. Instruction to return for any changes in condition or new symptoms. [MN]   362 67-year-old female who is seen in the emergency department yesterday morning for the concerns of left flank pain. She had extensive work-up and was found to have a 8 mm mildly obstructing stone at the left UV PJ. Patient was given Toradol morphine fluids and Zofran to control the symptoms. There was shared decision making and the patient decided to go home with urology follow-up and she understood to come back to emergency department if symptoms are worsening. She was prescribed Norco and Zofran and Flomax. She comes in today for concerns of urinary retention. She says she has not had a full stream of urine since the morning. She states that her urine is dribbling. She also reports that there is no symptomatic relief from [address] and the pain is getting worse.   She reports similar pain symptoms in the left flank that occasionally migrates to the groin. Describes pain as a sharp that is colicky. Patient reported about 3 episode of emesis. She is able to keep food and water down. She had 1 dose of Zofran but did not believe it is helping her symptoms. Upon entering the room the patient is hemodynamic stable she is afebrile she is in no acute distress. She is not tachycardic. There is tenderness to the left flank and left lower quadrant. Otherwise abdomen is soft no guarding no rigidity no firmness no tenderness. She denies GI bleed denies chest pain denies shortness of breath denies headache denies fever denies chills denies cough. Patient has not reached out to urology after she was discharged. [MN]   3458 Plan is to repeat labs check to see if there is any acute changes in kidney functions white blood cell count lactic lipase. And control symptoms with fluids and Toradol. Patient does not need another CT scan the risk of double radiation can be avoided due to CT scan that was done yesterday morning that shows kidney stones. [MN]   8167 Patient was able to urinate normally in the emergency department. She would prefer not to have Zurita placed at this time [KK]   1628 CMP is remarkable for a bump in creatinine of this morning. 0.8 to now 1.2 [MN]   0633 CBC is unremarkable for infectious counts and has a normal hemoglobin hematocrit and platelets. Lactic acid is normal.  Lipase is normal.  Urinalysis is unremarkable for UTI although it is showing blood which is expected due to the patient's having kidney stone. [MN]   0517 I spoke with Dr. Adriana Christopher and discussed that the patient is a bounce back for a kidney stone with increased creatinine levels. He asked to keep the patient put her on n.p.o. someone for radiology will see her. I paged the hospital team and waiting to hear back to admit the patient.   I discussed the labs and imaging findings and plan to admit the patient with the patient herself she agreed to be admitted for further evaluation. [MN]   1314 I spoke with admitting team who agreed to admit the patient for 8 mm kidney stone. [MN]      ED Course User Index  [KK] Margie Hoyt MD  [MN] Vivienne Cohen,         ED Course as of 02/16/23 2048   Thu Feb 16, 2023   4018 External note from ED visit yesterday  . No leukocytosis or anemia. Metabolic panel is within acceptable limits, normal kidney function. LFTs unremarkable, lipase normal.  Lactic acid 1.3. CT imaging shows an 8 mm mildly obstructing stone at the left UPJ. Patient was given 4 mg IV Zofran, 4 mg IV morphine, 15 mg IV Toradol with 1 L normal saline. She is feeling significantly better after medication. Discussion with patient regarding the size of stone but only mildly obstructing. As she has no leukocytosis or lactic acidosis, she feels comfortable with discharge and follow-up to urology knowing that she may need urologic intervention to pass the stone. She will be provided Norco, Zofran and Flomax prescriptions. Instruction to return for any changes in condition or new symptoms. [MN]   362 64-year-old female who is seen in the emergency department yesterday morning for the concerns of left flank pain. She had extensive work-up and was found to have a 8 mm mildly obstructing stone at the left UV PJ. Patient was given Toradol morphine fluids and Zofran to control the symptoms. There was shared decision making and the patient decided to go home with urology follow-up and she understood to come back to emergency department if symptoms are worsening. She was prescribed Norco and Zofran and Flomax. She comes in today for concerns of urinary retention. She says she has not had a full stream of urine since the morning. She states that her urine is dribbling. She also reports that there is no symptomatic relief from [address] and the pain is getting worse.   She reports similar pain symptoms in the left flank that occasionally migrates to the groin. Describes pain as a sharp that is colicky. Patient reported about 3 episode of emesis. She is able to keep food and water down. She had 1 dose of Zofran but did not believe it is helping her symptoms. Upon entering the room the patient is hemodynamic stable she is afebrile she is in no acute distress. She is not tachycardic. There is tenderness to the left flank and left lower quadrant. Otherwise abdomen is soft no guarding no rigidity no firmness no tenderness. She denies GI bleed denies chest pain denies shortness of breath denies headache denies fever denies chills denies cough. Patient has not reached out to urology after she was discharged. [MN]   2993 Plan is to repeat labs check to see if there is any acute changes in kidney functions white blood cell count lactic lipase. And control symptoms with fluids and Toradol. Patient does not need another CT scan the risk of double radiation can be avoided due to CT scan that was done yesterday morning that shows kidney stones. [MN]   7915 Patient was able to urinate normally in the emergency department. She would prefer not to have Zurita placed at this time [KK]   1561 CMP is remarkable for a bump in creatinine of this morning. 0.8 to now 1.2 [MN]   0633 CBC is unremarkable for infectious counts and has a normal hemoglobin hematocrit and platelets. Lactic acid is normal.  Lipase is normal.  Urinalysis is unremarkable for UTI although it is showing blood which is expected due to the patient's having kidney stone. [MN]   4451 I spoke with Dr. Lalitha Marc and discussed that the patient is a bounce back for a kidney stone with increased creatinine levels. He asked to keep the patient put her on n.p.o. someone for radiology will see her. I paged the hospital team and waiting to hear back to admit the patient.   I discussed the labs and imaging findings and plan to admit the patient with the patient herself she agreed to be admitted for further evaluation. [MN]   9422 I spoke with admitting team who agreed to admit the patient for 8 mm kidney stone. [MN]      ED Course User Index  [KK] Stanislav Goodman MD  [MN] Tiffany GarciakelDO kayla       --------------------------------------------- PAST HISTORY ---------------------------------------------  Past Medical History:  has a past medical history of Angina, Anxiety attack, Arrhythmia, CAD (coronary artery disease), CAD (coronary artery disease), Chronic depression, Fatigue, Hyperlipidemia, Hypertension, Hypothyroidism, Nephrolithiasis, Obesity (BMI 35.0-39.9 without comorbidity), Osteoarthritis, Sleep apnea, Stage 1 mild COPD by GOLD classification ([address] Thyroid disease, and Uncontrolled daytime somnolence. Past Surgical History:  has a past surgical history that includes ovarian cyst removal (Left); Uterine fibroid surgery;  section; Abdominal adhesion surgery; Hysterectomy; Carpal tunnel release (Right); Carpal tunnel release (Left); Finger trigger release (Right); and Finger trigger release (Left). Social History:  reports that she has been smoking cigarettes. She started smoking about 22 years ago. She has a 37.00 pack-year smoking history. She has never used smokeless tobacco. She reports current alcohol use. She reports that she does not use drugs. Family History: family history includes Cancer in her mother; Depression in her sister, sister, and sister; Diabetes in her paternal uncle; Heart Attack in her father; Heart Disease in her father and paternal uncle; High Blood Pressure in her brother, sister, sister, and sister; Hypertension in her father; No Known Problems in her brother and another family member; Stroke in her mother. The patients home medications have been reviewed.     Allergies: Biaxin [clarithromycin] and Levofloxacin    -------------------------------------------------- RESULTS -------------------------------------------------    LABS:  Results for orders placed or performed during the hospital encounter of 02/16/23   CBC with Auto Differential   Result Value Ref Range    WBC 10.2 4.5 - 11.5 E9/L    RBC 4.05 3.50 - 5.50 E12/L    Hemoglobin 11.8 11.5 - 15.5 g/dL    Hematocrit 36.9 34.0 - 48.0 %    MCV 91.1 80.0 - 99.9 fL    MCH 29.1 26.0 - 35.0 pg    MCHC 32.0 32.0 - 34.5 %    RDW 12.9 11.5 - 15.0 fL    Platelets 839 562 - 882 E9/L    MPV 12.9 (H) 7.0 - 12.0 fL    Neutrophils % 73.2 43.0 - 80.0 %    Immature Granulocytes % 0.3 0.0 - 5.0 %    Lymphocytes % 19.3 (L) 20.0 - 42.0 %    Monocytes % 5.5 2.0 - 12.0 %    Eosinophils % 1.2 0.0 - 6.0 %    Basophils % 0.5 0.0 - 2.0 %    Neutrophils Absolute 7.48 (H) 1.80 - 7.30 E9/L    Immature Granulocytes # 0.03 E9/L    Lymphocytes Absolute 1.97 1.50 - 4.00 E9/L    Monocytes Absolute 0.56 0.10 - 0.95 E9/L    Eosinophils Absolute 0.12 0.05 - 0.50 E9/L    Basophils Absolute 0.05 0.00 - 0.20 E9/L   Comprehensive Metabolic Panel   Result Value Ref Range    Sodium 140 132 - 146 mmol/L    Potassium 3.5 3.5 - 5.0 mmol/L    Chloride 102 98 - 107 mmol/L    CO2 27 22 - 29 mmol/L    Anion Gap 11 7 - 16 mmol/L    Glucose 134 (H) 74 - 99 mg/dL    BUN 18 6 - 23 mg/dL    Creatinine 1.2 (H) 0.5 - 1.0 mg/dL    Est, Glom Filt Rate 50 >=60 mL/min/1.73    Calcium 9.3 8.6 - 10.2 mg/dL    Total Protein 7.0 6.4 - 8.3 g/dL    Albumin 3.8 3.5 - 5.2 g/dL    Total Bilirubin 0.4 0.0 - 1.2 mg/dL    Alkaline Phosphatase 56 35 - 104 U/L    ALT 22 0 - 32 U/L    AST 23 0 - 31 U/L   Lactic Acid   Result Value Ref Range    Lactic Acid 1.5 0.5 - 2.2 mmol/L   Lipase   Result Value Ref Range    Lipase 29 13 - 60 U/L   Urinalysis with Microscopic   Result Value Ref Range    Color, UA Yellow Straw/Yellow    Clarity, UA CLOUDY (A) Clear    Glucose, Ur Negative Negative mg/dL    Bilirubin Urine Negative Negative    Ketones, Urine Negative Negative mg/dL    Specific Gravity, UA 1.020 1.005 - 1.030    Blood, Urine LARGE (A) Negative    pH, UA 6.5 5.0 - 9.0 Protein, UA Negative Negative mg/dL    Urobilinogen, Urine 0.2 <2.0 E.U./dL    Nitrite, Urine Negative Negative    Leukocyte Esterase, Urine TRACE (A) Negative    WBC, UA 1-3 0 - 5 /HPF    RBC, UA >20 0 - 2 /HPF    Epithelial Cells, UA MODERATE /HPF    Bacteria, UA NONE SEEN None Seen /HPF       RADIOLOGY:  No orders to display             ------------------------- NURSING NOTES AND VITALS REVIEWED ---------------------------  Date / Time Roomed:  2/16/2023  3:33 AM  ED Bed Assignment:  [id number]    The nursing notes within the ED encounter and vital signs as below have been reviewed. Patient Vitals for the past 24 hrs:   BP Temp Temp src Pulse Resp SpO2 Height Weight   02/16/23 1902 (!) 112/59 98.1 °F (36.7 °C) Oral 71 19 93 % -- --   02/16/23 1154 -- -- -- -- 16 -- -- --   02/16/23 0859 114/65 97.4 °F (36.3 °C) Oral 73 18 93 % -- --   02/16/23 0603 128/69 -- -- 90 -- -- -- --   02/16/23 0310 129/80 98.6 °F (37 °C) Oral 91 18 97 % 5' 3\" (1.6 m) 217 lb (98.4 kg)       Oxygen Saturation Interpretation: Normal      Counseling:  I have spoken with the patient and discussed todays results, in addition to providing specific details for the plan of care and counseling regarding the diagnosis and prognosis. Their questions are answered at this time and they are agreeable with the plan of admission. Diagnosis:  1. Kidney stone    2. Ureteric stone    3. ERICKA (acute kidney [facility]) ([facility]        Disposition:  Patient's disposition: Admit to med/surg floor  Patient's condition is stable. Attending was present and available throughout encounter including all critical portions;  See Attending Note/Attestation for Final Plan       Lidia Krause DO  Resident  02/16/23 2057

## 2023-02-16 NOTE — PATIENT CARE CONFERENCE
[facility] Quality Flow/Interdisciplinary Rounds Progress Note        Quality Flow Rounds held on February 16, 2023    Disciplines Attending:  Bedside Nurse, , , and Nursing Unit Leadership    Anson Mathew was admitted on 2/16/2023  3:33 AM    Anticipated Discharge Date:       Disposition:    Richard Score:  Richard Scale Score: 22    Readmission Risk              Risk of Unplanned Readmission:  8           Discussed patient goal for the day, patient clinical progression, and barriers to discharge.   The following Goal(s) of the Day/Commitment(s) have been identified:  Diagnostics - Report Results      Nava Rojas RN  February 16, 2023

## 2023-02-16 NOTE — ED NOTES
Was seen here this morning for the same. Was told to return if the problem returned.      Camilla Hsu  02/16/23 0302

## 2023-02-16 NOTE — CONSULTS
2/16/2023 12:04 PM  Madonna Oro  [id number]     Chief Complaint:    8 mm left UPJ calculi    History of Present Illness: The patient is a 57 y.o. female patient who presented to the hospital yesterday with complaints of left lower quadrant abdominal pain. She had a CT abdomen pelvis performed that showed an 8 mm left UPJ calculi with left hydronephrosis. She again returned to the hospital early this morning with worsening left flank pain. She was admitted for pain control. Creatinine is currently 1.2. She has no leukocytosis. Vital signs are currently stable. She continues to have left flank pain with nausea and emesis. She does report history of kidney stones requiring intervention approximately 20 years ago. Denies any fever or chills currently.       Past Medical History:   Diagnosis Date    Angina     Anxiety attack     Arrhythmia     Symptomatic palpitatoins    CAD (coronary artery disease)     CAD (coronary artery disease)     Chronic depression     Fatigue     Hyperlipidemia     Hypertension     Hypothyroidism     Nephrolithiasis 2/16/2023    Obesity (BMI 35.0-39.9 without comorbidity) 12/27/2016    Osteoarthritis     Sleep apnea     Stage 1 mild COPD by GOLD classification ([facility] [address] 6/4/2018    Thyroid disease     hypothyroidism    Uncontrolled daytime somnolence          Past Surgical History:   Procedure Laterality Date    ABDOMINAL ADHESION SURGERY      and endometrioma removal    CARPAL TUNNEL RELEASE Right     Right CTR  Dr. Shasha Knapp      x 2    FINGER TRIGGER RELEASE Right     Right trigger thumb release Dr. Gill Early Left     Left trigger thumb release Dr. Maira Moreno (CERVIX STATUS UNKNOWN)      and BSO    OVARIAN CYST REMOVAL Left     UTERINE FIBROID SURGERY         Medications Prior to Admission:    Medications Prior to Admission: tamsulosin (FLOMAX) 0.4 MG capsule, Take 1 capsule by mouth daily for 10 days  ondansetron (ZOFRAN-ODT) 4 MG disintegrating tablet, Take 1 tablet by mouth 3 times daily as needed for Nausea or Vomiting  pravastatin (PRAVACHOL) 40 MG tablet, 1 po qd q hs  metoprolol tartrate (LOPRESSOR) 50 MG tablet, Take 1 tablet by mouth 2 times daily  losartan-hydroCHLOROthiazide (HYZAAR) 100-25 MG per tablet, Take 1 tablet daily  levothyroxine (EUTHYROX) 75 MCG tablet, Take 1 tablet by mouth once daily  fenofibrate (TRIGLIDE) 160 MG tablet, 1 po qd  DULoxetine (CYMBALTA) 60 MG extended release capsule, Take 1 tablet daily  allopurinol (ZYLOPRIM) 300 MG tablet, 1 po qd  nystatin (MYCOSTATIN) 874518 UNIT/GM powder, Apply 3 times daily. clobetasol (TEMOVATE) 0.05 % cream, Apply topically 2 times daily as needed (psoriasis) For psoriasis  Ascorbic Acid (VITAMIN C WITH RICARDO HIPS) 500 MG tablet,   Glucosamine-Chondroitin (GLUCOSAMINE CHONDR COMPLEX PO), Take by mouth 2 times daily  clotrimazole-betamethasone (LOTRISONE) 1-0.05 % cream, Apply topically 2 times daily. (Patient taking differently: as needed Apply topically 2 times daily.)  clobetasol (TEMOVATE) 0.05 % ointment, Apply 1 each topically 2 times daily Apply topically 2 times daily. (Patient taking differently: Apply 1 each topically as needed Apply topically 2 times daily.)  albuterol sulfate HFA (VENTOLIN HFA) 108 (90 Base) MCG/ACT inhaler, Inhale 2 puffs into the lungs every 6 hours as needed for Wheezing  multivitamin (THERAGRAN) per tablet, Take 1 tablet by mouth daily. Cholecalciferol (VITAMIN D) 2000 UNITS TABS, Take 1,000 Units by mouth daily. Allergies:    Biaxin [clarithromycin] and Levofloxacin    Social History:    reports that she has been smoking cigarettes. She started smoking about 22 years ago. She has a 37.00 pack-year smoking history. She has never used smokeless tobacco. She reports current alcohol use. She reports that she does not use drugs.   She is  with 2 children    Family History:   Non-contributory to this Urological problem  family history includes Cancer in her mother; Depression in her sister, sister, and sister; Diabetes in her paternal uncle; Heart Attack in her father; Heart Disease in her father and paternal uncle; High Blood Pressure in her brother, sister, sister, and sister; Hypertension in her father; No Known Problems in her brother and another family member; Stroke in her mother. Review of Systems:  Constitutional: No fever or chills   Respiratory: negative for cough and hemoptysis  Cardiovascular: negative for chest pain and dyspnea  Gastrointestinal: Positive for left lower quadrant abdominal pain nausea, and vomiting. Derm: negative for rash and skin lesion(s)  Neurological: negative for seizures and tremors  Musculoskeletal: Negative    Psychiatric: Negative   : As above in the HPI, otherwise negative  All other reviews are negative    Physical Exam:     Vitals:  /65   Pulse 73   Temp 97.4 °F (36.3 °C) (Oral)   Resp 18   Ht 5' 3\" (1.6 m)   Wt 217 lb (98.4 kg)   SpO2 93%   BMI 38.44 kg/m²     General:  Awake, alert, oriented X 3. No apparent distress. HEENT:  Normocephalic, atraumatic. Lungs:  Respirations symmetric and non-labored. Abdomen:  soft, nontender, no masses  Extremities:  No clubbing, cyanosis, or edema  Skin:  Warm and dry, no open lesions or rashes  Neuro:  There are no motor or sensory deficits in the 4 quadrant extremities   Rectal: deferred  Genitourinary: No [place]    Labs:     Recent Labs     02/15/23  0919 02/16/23  0513   WBC 9.0 10.2   RBC 4.39 4.05   HGB 12.9 11.8   HCT 39.6 36.9   MCV 90.2 91.1   MCH 29.4 29.1   MCHC 32.6 32.0   RDW 12.8 12.9    130   MPV 11.8 12.9*         Recent Labs     02/15/23  0919 02/16/23  0513   CREATININE 0.8 1.2*       No results found for: PSA    Imaging:   Narrative   EXAMINATION:   CT OF THE ABDOMEN AND PELVIS WITHOUT CONTRAST 2/15/2023 9:24 am       TECHNIQUE:   CT of the abdomen and pelvis was performed without the administration of   intravenous contrast. Multiplanar reformatted images are provided for review. Automated exposure control, iterative reconstruction, and/or weight based   adjustment of the mA/kV was utilized to reduce the radiation dose to as low   as reasonably achievable. COMPARISON:   None. HISTORY:   ORDERING SYSTEM PROVIDED HISTORY: LLQ pain, hematuria   TECHNOLOGIST PROVIDED HISTORY:   Reason for exam:->LLQ pain, hematuria   Additional Contrast?->None   Decision Support Exception - unselect if not a suspected or confirmed   emergency medical condition->Emergency Medical Condition (MA)       FINDINGS:   Lower Chest: The lung bases are grossly clear. Organs: The liver is homogeneous in appearance. The spleen is unremarkable. No stones seen in the gallbladder. The pancreas is homogeneous in   appearance. Both adrenal glands are within normal limits. There is a stone   identified within the left UPJ measuring 8 mm in size creating some mild   distension in obstruction of the left renal collecting system. Several tiny   nonobstructing stones seen throughout the remainder of the kidneys   bilaterally. No other ureteral calcification is identified. GI/Bowel: The stomach is unremarkable. No wall thickening. The small bowel   is within normal limits. No mucosal abnormality or significant distension. Stool seen scattered diffusely throughout the colon. The appendix is normal.   No signs of obvious obstruction or obstructing lesion. Pelvis: The bladder is incompletely distended. No gross mass or lesion. The   uterus is unremarkable. Peritoneum/Retroperitoneum: No abdominal or retroperitoneal lymphadenopathy. No free fluid or free air. No abnormal mass or fluid collections identified. Bones/Soft Tissues: The bony structures reveal degenerative changes seen   within the spine and pelvis.   There is diastasis of the rectus muscles with   laxity of the peritoneal lining and small umbilical hernia. Impression   8 mm mildly obstructing stone at the left UPJ. There is distension seen in   the left renal collecting system. Multiple smaller nonobstructing stones   seen throughout the kidneys bilaterally. Assessment/plan:  8 mm left UPJ calculi  Left hydronephrosis  ERICKA  Bilateral nonobstructing renal calculi    Continue to watch the creatinine  UA reviewed  Start Rocephin  CT abdomen pelvis reviewed shows a 8 mm left UPJ calculi  N.p.o. after midnight  We will proceed to OR tomorrow for cystoscopy, retrograde pyelogram, left stent insertion  Parameters placed to notify Urology with any hemodynamic instability   Strain urine  Consented to the above procedure   Will follow       Electronically signed by BRICE Palencia CNP on 2/16/2023 at 12:04 PM  [facility]       The patient was seen and examined. I have reviewed the medical record in detail. I agree with the plan as outlined by CARLOTA Palencia.     Aakash Marquez MD  4:47 PM  2/16/2023

## 2023-02-16 NOTE — H&P
[facility] History and Physical      CHIEF COMPLAINT: known kidney stone, difficulty urinating    History of Present Illness:     Patient is a 56 yo female patient who follows with PCP Dr. Fletcher Espinoza with a past medical history of anxiety, CAD, chronic depression, COPD,  fatigue, HTN, thyroid disease, and sleep apnea. Patient presented to the ER with difficulty urinating. Reporting worsening left flank pain. She was in the ER yesterday and diagnosed with a 8 mm mildly obstructing stone at the left UV PJ. She reported hematuria at that time. She was given morphine/ toradol/ fluids and zofran. Patient was then discharged with scripts for norco, zofran and  flomax with plans of outpatient follow up with urology. Patient returns to ER today with worsening symptoms. Reporting associated nausea and vomiting. Worsening pain. Urination was dribble like. Symptoms moderate ongoing and progressive. Nothing makes better. Reporting history of smaller kidney stones which she has passed at home. Reporting she was hospitalized over 20 years ago when she had her first issue with kidney stone. Patient awake and alert- resting in bed in no acute distress. Reporting she was tired as she did not sleep well last night. Did not like how IV dilaudid made her feel in ER. Reporting caused her sob. Denies fevers, chills, chest pain, sob, hematochezia. Reporting she does have sleep apnea- she has been borrowing a friend's CPAP machine as her machine broke.               Informant(s) for H&P: Patient/ chart review     REVIEW OF SYSTEMS:  A comprehensive review of systems was negative except for: what is in the HPI      PMH:  Past Medical History:   Diagnosis Date    Angina     Anxiety attack     Arrhythmia     Symptomatic palpitatoins    CAD (coronary artery disease)     CAD (coronary artery disease)     Chronic depression     Fatigue     Hyperlipidemia     Hypertension     Hypothyroidism     Nephrolithiasis 2/16/2023 Obesity (BMI 35.0-39.9 without comorbidity) 12/27/2016    Osteoarthritis     Sleep apnea     Stage 1 mild COPD by GOLD classification ([facility] [address] 6/4/2018    Thyroid disease     hypothyroidism    Uncontrolled daytime somnolence        Surgical History:  Past Surgical History:   Procedure Laterality Date    ABDOMINAL ADHESION SURGERY      and endometrioma removal    CARPAL TUNNEL RELEASE Right     Right CTR  Dr. Gianfranco Roberts      x 2    FINGER TRIGGER RELEASE Right     Right trigger thumb release Dr. Floridalma Burleson Left     Left trigger thumb release Dr. Chanda Nails (CERVIX STATUS UNKNOWN)      and BSO    OVARIAN CYST REMOVAL Left     UTERINE FIBROID SURGERY         Medications Prior to Admission:    Prior to Admission medications    Medication Sig Start Date End Date Taking? Authorizing Provider   tamsulosin (FLOMAX) 0.4 MG capsule Take 1 capsule by mouth daily for 10 days 2/15/23 2/25/23  Donnemontez Brennan, DO   ondansetron (ZOFRAN-ODT) 4 MG disintegrating tablet Take 1 tablet by mouth 3 times daily as needed for Nausea or Vomiting 2/15/23   Fer Brennan, DO   pravastatin (PRAVACHOL) 40 MG tablet 1 po qd q hs 10/3/22   Olivia Figueroa, DO   metoprolol tartrate (LOPRESSOR) 50 MG tablet Take 1 tablet by mouth 2 times daily 10/3/22   Olivia Figueroa, DO   losartan-hydroCHLOROthiazide [facility]) 100-25 MG per tablet Take 1 tablet daily 10/3/22   Zoe Soto, DO   levothyroxine (EUTHYROX) 75 MCG tablet Take 1 tablet by mouth once daily 10/3/22   Olivia Figueroa, DO   fenofibrate (TRIGLIDE) 160 MG tablet 1 po qd 10/3/22   Olivia Figueroa, DO   DULoxetine (CYMBALTA) 60 MG extended release capsule Take 1 tablet daily 10/3/22   Olivia Figueroa, DO   allopurinol (ZYLOPRIM) 300 MG tablet 1 po qd 10/3/22   Olivia Figueroa,    nystatin (MYCOSTATIN) 148664 UNIT/GM powder Apply 3 times daily. 6/6/22   Vick Correa PA-C   clobetasol (TEMOVATE) 0.05 % cream Apply topically 2 times daily as needed (psoriasis) For psoriasis 6/6/22   Vick Correa PA-C   Ascorbic Acid (VITAMIN C WITH RICARDO HIPS) 500 MG tablet     Historical Provider, MD   Glucosamine-Chondroitin (GLUCOSAMINE CHONDR COMPLEX PO) Take by mouth 2 times daily    Historical Provider, MD   clotrimazole-betamethasone (LOTRISONE) 1-0.05 % cream Apply topically 2 times daily. Patient taking differently: as needed Apply topically 2 times daily. 7/9/19   Vick Correa PA-C   clobetasol (TEMOVATE) 0.05 % ointment Apply 1 each topically 2 times daily Apply topically 2 times daily. Patient taking differently: Apply 1 each topically as needed Apply topically 2 times daily. 7/10/18   Vick Correa PA-C   albuterol sulfate HFA (VENTOLIN HFA) 108 (90 Base) MCG/ACT inhaler Inhale 2 puffs into the lungs every 6 hours as needed for Wheezing 12/14/17   Job Feliz MD   multivitamin [facility]) per tablet Take 1 tablet by mouth daily. Historical Provider, MD   Cholecalciferol (VITAMIN D) 2000 UNITS TABS Take 1,000 Units by mouth daily. Historical Provider, MD       Allergies:    Biaxin [clarithromycin] and Levofloxacin    Social History:    reports that she has been smoking cigarettes. She started smoking about 22 years ago. She has a 37.00 pack-year smoking history. She has never used smokeless tobacco. She reports current alcohol use. She reports that she does not use drugs. Denies illicits  Denies etoh  Smoker 8 cigarettes daily. Family History:   family history includes Cancer in her mother; Depression in her sister, sister, and sister; Diabetes in her paternal uncle; Heart Attack in her father; Heart Disease in her father and paternal uncle; High Blood Pressure in her brother, sister, sister, and sister; Hypertension in her father; No Known Problems in her brother and another family member; Stroke in her mother.        PHYSICAL EXAM:  Vitals:  /69   Pulse 90   Temp 98.6 °F (37 °C) (Oral)   Resp 18   Ht 5' 3\" (1.6 m)   Wt 217 lb (98.4 kg)   SpO2 97%   BMI 38.44 kg/m²     General Appearance: alert and oriented to person, place and time   Skin: warm and dry  Neck: neck supple and non tender without mass   Pulmonary/Chest: clear to auscultation bilaterally. Cardiovascular: normal rate, normal S1 and S2 and no carotid bruits  Abdomen: soft, non-tender, non-distended, normal bowel sounds left CVA tenderness  Extremities: no cyanosis, no clubbing and no edema  Neurologic: speech normal         LABS:  Recent Labs     02/15/23  0919 02/16/23  0513    140   K 3.6 3.5    102   CO2 27 27   BUN 15 18   CREATININE 0.8 1.2*   GLUCOSE 128* 134*   CALCIUM 9.8 9.3       Recent Labs     02/15/23  0919 02/16/23  0513   WBC 9.0 10.2   RBC 4.39 4.05   HGB 12.9 11.8   HCT 39.6 36.9   MCV 90.2 91.1   MCH 29.4 29.1   MCHC 32.6 32.0   RDW 12.8 12.9    130   MPV 11.8 12.9*       No results for input(s): POCGLU in the last 72 hours. Radiology:   No orders to display         ASSESSMENT:      Principal Problem:    Nephrolithiasis  Resolved Problems:    * No resolved hospital problems. *      PLAN:    1.  8 mm mildly obstructing stone at the left UPJ:  pt returns to the ER for the 2nd time in 2 days. She originally presented to the ER with hematuria and left flank pain. She had imaging which revealed mildly obstructing. Multiple non obstructing stones seen on CT scan. She had no leukocytosis or lactic acidosis. Plan was for norco. Zofran and flomax with outpatient follow up with urology. She returned to the ER with worsening symptoms. Nausea/vomiting/ worsening flank pain/ urinary retention. Reporting not having a full stream of urine/ dribbling. Patient was able to urinate in the ER and declined a rutledge catheter. Afebrile/ no leukocytosis.  Labs now showing ERICKA- creatinine elevated to 1.2. UA showing large blood and trace leukocytes. Patient.  given IVF and pain medicine. Consult urology. Patient seen resting in bed. Pain better. Await urology input. 2.Nausea/ vomiting: due to above; Antiemetics    3. ERICKA; creatinine increased to 1.2- Was noted to be 0.8 yesterday. Rehydrate. Hold arb/ hctz for now. Avoid  nephrotoxic meds. 4. Possible UTI: rocephin started per urology- follow culture. 5. HLD: statin    6. Thyroid disease: synthroid    7. Tobacco use: cessation advised- smokes 8 cigarettes daily. Pt requesting nicotine patch. 8. Sleep apnea: reporting her machine broke and she was using a friends. Will need outpatient polysomnography. 9. HTN: holding hyzaar- continue metoprolol. Monitor BP. Code Status: FULL  DVT prophylaxis: lovenox      NOTE: This report was transcribed using voice recognition software. Every effort was made to ensure accuracy; however, inadvertent computerized transcription errors may be present.   Electronically signed by SUSAN Persaud on 2/16/2023 at 8:54 AM

## 2023-02-16 NOTE — ED NOTES
DRISS faxed to floor, received confirmation from [facility], [address] [facility]  02/16/23 2416

## 2023-02-16 NOTE — PROGRESS NOTES
Database initiated pharmacy and medications verified with the patient. She is A&O independent from home with . States she uses no assistive devices and is RA at baseline. She does use a c-pap at night.

## 2023-02-17 ENCOUNTER — ANESTHESIA (OUTPATIENT)
Dept: OPERATING ROOM | Age: 63
End: 2023-02-17
Payer: MEDICAID

## 2023-02-17 ENCOUNTER — APPOINTMENT (OUTPATIENT)
Dept: GENERAL RADIOLOGY | Age: 63
DRG: 463 | End: 2023-02-17
Payer: MEDICAID

## 2023-02-17 LAB
ANION GAP SERPL CALCULATED.3IONS-SCNC: 9 MMOL/L (ref 7–16)
BASOPHILS ABSOLUTE: 0.03 E9/L (ref 0–0.2)
BASOPHILS RELATIVE PERCENT: 0.4 % (ref 0–2)
BUN BLDV-MCNC: 16 MG/DL (ref 6–23)
CALCIUM SERPL-MCNC: 8.5 MG/DL (ref 8.6–10.2)
CHLORIDE BLD-SCNC: 106 MMOL/L (ref 98–107)
CO2: 27 MMOL/L (ref 22–29)
CREAT SERPL-MCNC: 1.8 MG/DL (ref 0.5–1)
EOSINOPHILS ABSOLUTE: 0.14 E9/L (ref 0.05–0.5)
EOSINOPHILS RELATIVE PERCENT: 2 % (ref 0–6)
GFR SERPL CREATININE-BSD FRML MDRD: 31 ML/MIN/1.73
GLUCOSE BLD-MCNC: 103 MG/DL (ref 74–99)
HCT VFR BLD CALC: 34.8 % (ref 34–48)
HEMOGLOBIN: 10.9 G/DL (ref 11.5–15.5)
IMMATURE GRANULOCYTES #: 0.02 E9/L
IMMATURE GRANULOCYTES %: 0.3 % (ref 0–5)
LYMPHOCYTES ABSOLUTE: 1.94 E9/L (ref 1.5–4)
LYMPHOCYTES RELATIVE PERCENT: 27.2 % (ref 20–42)
MAGNESIUM: 2 MG/DL (ref 1.6–2.6)
MCH RBC QN AUTO: 29.5 PG (ref 26–35)
MCHC RBC AUTO-ENTMCNC: 31.3 % (ref 32–34.5)
MCV RBC AUTO: 94.1 FL (ref 80–99.9)
MONOCYTES ABSOLUTE: 0.37 E9/L (ref 0.1–0.95)
MONOCYTES RELATIVE PERCENT: 5.2 % (ref 2–12)
NEUTROPHILS ABSOLUTE: 4.62 E9/L (ref 1.8–7.3)
NEUTROPHILS RELATIVE PERCENT: 64.9 % (ref 43–80)
PDW BLD-RTO: 13.1 FL (ref 11.5–15)
PLATELET # BLD: 81 E9/L (ref 130–450)
PLATELET CONFIRMATION: NORMAL
PMV BLD AUTO: 14.7 FL (ref 7–12)
POTASSIUM REFLEX MAGNESIUM: 3.4 MMOL/L (ref 3.5–5)
RBC # BLD: 3.7 E12/L (ref 3.5–5.5)
RBC # BLD: NORMAL 10*6/UL
SODIUM BLD-SCNC: 142 MMOL/L (ref 132–146)
WBC # BLD: 7.1 E9/L (ref 4.5–11.5)

## 2023-02-17 PROCEDURE — 1200000000 HC SEMI PRIVATE

## 2023-02-17 PROCEDURE — 2580000003 HC RX 258: Performed by: NURSE PRACTITIONER

## 2023-02-17 PROCEDURE — 0T778DZ DILATION OF LEFT URETER WITH INTRALUMINAL DEVICE, VIA NATURAL OR ARTIFICIAL OPENING ENDOSCOPIC: ICD-10-PCS | Performed by: STUDENT IN AN ORGANIZED HEALTH CARE EDUCATION/TRAINING PROGRAM

## 2023-02-17 PROCEDURE — 6360000002 HC RX W HCPCS: Performed by: NURSE PRACTITIONER

## 2023-02-17 PROCEDURE — 83735 ASSAY OF MAGNESIUM: CPT

## 2023-02-17 PROCEDURE — 3700000001 HC ADD 15 MINUTES (ANESTHESIA): Performed by: STUDENT IN AN ORGANIZED HEALTH CARE EDUCATION/TRAINING PROGRAM

## 2023-02-17 PROCEDURE — 74420 UROGRAPHY RTRGR +-KUB: CPT

## 2023-02-17 PROCEDURE — C1769 GUIDE WIRE: HCPCS | Performed by: STUDENT IN AN ORGANIZED HEALTH CARE EDUCATION/TRAINING PROGRAM

## 2023-02-17 PROCEDURE — 36415 COLL VENOUS BLD VENIPUNCTURE: CPT

## 2023-02-17 PROCEDURE — 99231 SBSQ HOSP IP/OBS SF/LOW 25: CPT | Performed by: STUDENT IN AN ORGANIZED HEALTH CARE EDUCATION/TRAINING PROGRAM

## 2023-02-17 PROCEDURE — 7100000010 HC PHASE II RECOVERY - FIRST 15 MIN: Performed by: STUDENT IN AN ORGANIZED HEALTH CARE EDUCATION/TRAINING PROGRAM

## 2023-02-17 PROCEDURE — C2617 STENT, NON-COR, TEM W/O DEL: HCPCS | Performed by: STUDENT IN AN ORGANIZED HEALTH CARE EDUCATION/TRAINING PROGRAM

## 2023-02-17 PROCEDURE — 7100000011 HC PHASE II RECOVERY - ADDTL 15 MIN: Performed by: STUDENT IN AN ORGANIZED HEALTH CARE EDUCATION/TRAINING PROGRAM

## 2023-02-17 PROCEDURE — 6370000000 HC RX 637 (ALT 250 FOR IP): Performed by: STUDENT IN AN ORGANIZED HEALTH CARE EDUCATION/TRAINING PROGRAM

## 2023-02-17 PROCEDURE — 6360000004 HC RX CONTRAST MEDICATION: Performed by: STUDENT IN AN ORGANIZED HEALTH CARE EDUCATION/TRAINING PROGRAM

## 2023-02-17 PROCEDURE — APPSS30 APP SPLIT SHARED TIME 16-30 MINUTES: Performed by: NURSE PRACTITIONER

## 2023-02-17 PROCEDURE — 3600000003 HC SURGERY LEVEL 3 BASE: Performed by: STUDENT IN AN ORGANIZED HEALTH CARE EDUCATION/TRAINING PROGRAM

## 2023-02-17 PROCEDURE — 6370000000 HC RX 637 (ALT 250 FOR IP): Performed by: NURSE PRACTITIONER

## 2023-02-17 PROCEDURE — 3700000000 HC ANESTHESIA ATTENDED CARE: Performed by: STUDENT IN AN ORGANIZED HEALTH CARE EDUCATION/TRAINING PROGRAM

## 2023-02-17 PROCEDURE — 80048 BASIC METABOLIC PNL TOTAL CA: CPT

## 2023-02-17 PROCEDURE — 6360000002 HC RX W HCPCS: Performed by: ANESTHESIOLOGY

## 2023-02-17 PROCEDURE — 2709999900 HC NON-CHARGEABLE SUPPLY: Performed by: STUDENT IN AN ORGANIZED HEALTH CARE EDUCATION/TRAINING PROGRAM

## 2023-02-17 PROCEDURE — 2500000003 HC RX 250 WO HCPCS: Performed by: NURSE ANESTHETIST, CERTIFIED REGISTERED

## 2023-02-17 PROCEDURE — 85025 COMPLETE CBC W/AUTO DIFF WBC: CPT

## 2023-02-17 PROCEDURE — 3600000013 HC SURGERY LEVEL 3 ADDTL 15MIN: Performed by: STUDENT IN AN ORGANIZED HEALTH CARE EDUCATION/TRAINING PROGRAM

## 2023-02-17 PROCEDURE — 6360000002 HC RX W HCPCS: Performed by: NURSE ANESTHETIST, CERTIFIED REGISTERED

## 2023-02-17 DEVICE — URETERAL STENT
Type: IMPLANTABLE DEVICE | Site: URETER | Status: FUNCTIONAL
Brand: POLARIS™ ULTRA

## 2023-02-17 RX ORDER — FAMOTIDINE 10 MG/ML
INJECTION, SOLUTION INTRAVENOUS PRN
Status: DISCONTINUED | OUTPATIENT
Start: 2023-02-17 | End: 2023-02-17 | Stop reason: SDUPTHER

## 2023-02-17 RX ORDER — HYDRALAZINE HYDROCHLORIDE 20 MG/ML
10 INJECTION INTRAMUSCULAR; INTRAVENOUS
Status: DISCONTINUED | OUTPATIENT
Start: 2023-02-17 | End: 2023-02-17 | Stop reason: HOSPADM

## 2023-02-17 RX ORDER — SODIUM CHLORIDE 9 MG/ML
25 INJECTION, SOLUTION INTRAVENOUS PRN
Status: DISCONTINUED | OUTPATIENT
Start: 2023-02-17 | End: 2023-02-17 | Stop reason: HOSPADM

## 2023-02-17 RX ORDER — SODIUM CHLORIDE 0.9 % (FLUSH) 0.9 %
5-40 SYRINGE (ML) INJECTION PRN
Status: DISCONTINUED | OUTPATIENT
Start: 2023-02-17 | End: 2023-02-17 | Stop reason: HOSPADM

## 2023-02-17 RX ORDER — PROPOFOL 10 MG/ML
INJECTION, EMULSION INTRAVENOUS PRN
Status: DISCONTINUED | OUTPATIENT
Start: 2023-02-17 | End: 2023-02-17 | Stop reason: SDUPTHER

## 2023-02-17 RX ORDER — PROPOFOL 10 MG/ML
INJECTION, EMULSION INTRAVENOUS CONTINUOUS PRN
Status: DISCONTINUED | OUTPATIENT
Start: 2023-02-17 | End: 2023-02-17 | Stop reason: SDUPTHER

## 2023-02-17 RX ORDER — SODIUM CHLORIDE 0.9 % (FLUSH) 0.9 %
5-40 SYRINGE (ML) INJECTION EVERY 12 HOURS SCHEDULED
Status: DISCONTINUED | OUTPATIENT
Start: 2023-02-17 | End: 2023-02-17 | Stop reason: HOSPADM

## 2023-02-17 RX ORDER — DIPHENHYDRAMINE HYDROCHLORIDE 50 MG/ML
INJECTION INTRAMUSCULAR; INTRAVENOUS PRN
Status: DISCONTINUED | OUTPATIENT
Start: 2023-02-17 | End: 2023-02-17 | Stop reason: SDUPTHER

## 2023-02-17 RX ORDER — POTASSIUM CHLORIDE 20 MEQ/1
20 TABLET, EXTENDED RELEASE ORAL ONCE
Status: COMPLETED | OUTPATIENT
Start: 2023-02-17 | End: 2023-02-17

## 2023-02-17 RX ORDER — ONDANSETRON 2 MG/ML
4 INJECTION INTRAMUSCULAR; INTRAVENOUS
Status: DISCONTINUED | OUTPATIENT
Start: 2023-02-17 | End: 2023-02-17 | Stop reason: HOSPADM

## 2023-02-17 RX ORDER — ONDANSETRON 2 MG/ML
INJECTION INTRAMUSCULAR; INTRAVENOUS PRN
Status: DISCONTINUED | OUTPATIENT
Start: 2023-02-17 | End: 2023-02-17 | Stop reason: SDUPTHER

## 2023-02-17 RX ORDER — MIDAZOLAM HYDROCHLORIDE 2 MG/2ML
2 INJECTION, SOLUTION INTRAMUSCULAR; INTRAVENOUS
Status: DISCONTINUED | OUTPATIENT
Start: 2023-02-17 | End: 2023-02-17 | Stop reason: HOSPADM

## 2023-02-17 RX ORDER — FENTANYL CITRATE 50 UG/ML
INJECTION, SOLUTION INTRAMUSCULAR; INTRAVENOUS PRN
Status: DISCONTINUED | OUTPATIENT
Start: 2023-02-17 | End: 2023-02-17 | Stop reason: SDUPTHER

## 2023-02-17 RX ORDER — DEXAMETHASONE SODIUM PHOSPHATE 4 MG/ML
INJECTION, SOLUTION INTRA-ARTICULAR; INTRALESIONAL; INTRAMUSCULAR; INTRAVENOUS; SOFT TISSUE PRN
Status: DISCONTINUED | OUTPATIENT
Start: 2023-02-17 | End: 2023-02-17 | Stop reason: SDUPTHER

## 2023-02-17 RX ORDER — IPRATROPIUM BROMIDE AND ALBUTEROL SULFATE 2.5; .5 MG/3ML; MG/3ML
1 SOLUTION RESPIRATORY (INHALATION)
Status: DISCONTINUED | OUTPATIENT
Start: 2023-02-17 | End: 2023-02-17 | Stop reason: HOSPADM

## 2023-02-17 RX ORDER — LIDOCAINE HYDROCHLORIDE 20 MG/ML
INJECTION, SOLUTION EPIDURAL; INFILTRATION; INTRACAUDAL; PERINEURAL PRN
Status: DISCONTINUED | OUTPATIENT
Start: 2023-02-17 | End: 2023-02-17 | Stop reason: SDUPTHER

## 2023-02-17 RX ORDER — FAMOTIDINE 10 MG/ML
INJECTION, SOLUTION INTRAVENOUS
Status: COMPLETED
Start: 2023-02-17 | End: 2023-02-17

## 2023-02-17 RX ORDER — POTASSIUM CHLORIDE 20 MEQ/1
TABLET, EXTENDED RELEASE ORAL
Status: DISPENSED
Start: 2023-02-17 | End: 2023-02-18

## 2023-02-17 RX ORDER — LABETALOL HYDROCHLORIDE 5 MG/ML
10 INJECTION, SOLUTION INTRAVENOUS
Status: DISCONTINUED | OUTPATIENT
Start: 2023-02-17 | End: 2023-02-17 | Stop reason: HOSPADM

## 2023-02-17 RX ADMIN — KETOROLAC TROMETHAMINE 15 MG: 30 INJECTION, SOLUTION INTRAMUSCULAR; INTRAVENOUS at 12:03

## 2023-02-17 RX ADMIN — DULOXETINE HYDROCHLORIDE 60 MG: 60 CAPSULE, DELAYED RELEASE ORAL at 11:30

## 2023-02-17 RX ADMIN — DIPHENHYDRAMINE HYDROCHLORIDE 12.5 MG: 50 INJECTION, SOLUTION INTRAMUSCULAR; INTRAVENOUS at 15:58

## 2023-02-17 RX ADMIN — HYDROMORPHONE HYDROCHLORIDE 0.5 MG: 1 INJECTION, SOLUTION INTRAMUSCULAR; INTRAVENOUS; SUBCUTANEOUS at 16:56

## 2023-02-17 RX ADMIN — METOPROLOL TARTRATE 50 MG: 50 TABLET ORAL at 20:20

## 2023-02-17 RX ADMIN — POTASSIUM CHLORIDE 20 MEQ: 20 TABLET, EXTENDED RELEASE ORAL at 11:33

## 2023-02-17 RX ADMIN — ONDANSETRON 4 MG: 2 INJECTION INTRAMUSCULAR; INTRAVENOUS at 15:58

## 2023-02-17 RX ADMIN — ONDANSETRON 4 MG: 2 INJECTION INTRAMUSCULAR; INTRAVENOUS at 12:37

## 2023-02-17 RX ADMIN — SODIUM CHLORIDE, PRESERVATIVE FREE 1000 MG: 5 INJECTION INTRAVENOUS at 12:01

## 2023-02-17 RX ADMIN — DEXAMETHASONE SODIUM PHOSPHATE 8 MG: 4 INJECTION, SOLUTION INTRAMUSCULAR; INTRAVENOUS at 15:58

## 2023-02-17 RX ADMIN — PRAVASTATIN SODIUM 40 MG: 20 TABLET ORAL at 20:20

## 2023-02-17 RX ADMIN — FENTANYL CITRATE 50 MCG: 50 INJECTION, SOLUTION INTRAMUSCULAR; INTRAVENOUS at 15:58

## 2023-02-17 RX ADMIN — PROPOFOL 50 MG: 10 INJECTION, EMULSION INTRAVENOUS at 16:01

## 2023-02-17 RX ADMIN — PROPOFOL 150 MCG/KG/MIN: 10 INJECTION, EMULSION INTRAVENOUS at 16:01

## 2023-02-17 RX ADMIN — Medication 20 MG: at 16:08

## 2023-02-17 RX ADMIN — SODIUM CHLORIDE: 9 INJECTION, SOLUTION INTRAVENOUS at 20:24

## 2023-02-17 RX ADMIN — FENTANYL CITRATE 50 MCG: 50 INJECTION, SOLUTION INTRAMUSCULAR; INTRAVENOUS at 16:09

## 2023-02-17 RX ADMIN — LIDOCAINE HYDROCHLORIDE 40 MG: 20 INJECTION, SOLUTION EPIDURAL; INFILTRATION; INTRACAUDAL; PERINEURAL at 16:01

## 2023-02-17 RX ADMIN — KETOROLAC TROMETHAMINE 15 MG: 30 INJECTION, SOLUTION INTRAMUSCULAR; INTRAVENOUS at 02:23

## 2023-02-17 ASSESSMENT — PAIN DESCRIPTION - ONSET: ONSET: ON-GOING

## 2023-02-17 ASSESSMENT — PAIN SCALES - GENERAL
PAINLEVEL_OUTOF10: 3
PAINLEVEL_OUTOF10: 0
PAINLEVEL_OUTOF10: 4
PAINLEVEL_OUTOF10: 0
PAINLEVEL_OUTOF10: 2
PAINLEVEL_OUTOF10: 0
PAINLEVEL_OUTOF10: 6
PAINLEVEL_OUTOF10: 8
PAINLEVEL_OUTOF10: 7
PAINLEVEL_OUTOF10: 7

## 2023-02-17 ASSESSMENT — PAIN - FUNCTIONAL ASSESSMENT
PAIN_FUNCTIONAL_ASSESSMENT: ACTIVITIES ARE NOT PREVENTED
PAIN_FUNCTIONAL_ASSESSMENT: PREVENTS OR INTERFERES SOME ACTIVE ACTIVITIES AND ADLS
PAIN_FUNCTIONAL_ASSESSMENT: ACTIVITIES ARE NOT PREVENTED

## 2023-02-17 ASSESSMENT — PAIN DESCRIPTION - PAIN TYPE
TYPE: SURGICAL PAIN
TYPE: ACUTE PAIN
TYPE: SURGICAL PAIN

## 2023-02-17 ASSESSMENT — PAIN DESCRIPTION - DESCRIPTORS
DESCRIPTORS: ACHING;DISCOMFORT
DESCRIPTORS: BURNING
DESCRIPTORS: ACHING;DISCOMFORT;CRAMPING
DESCRIPTORS: BURNING
DESCRIPTORS: BURNING

## 2023-02-17 ASSESSMENT — PAIN DESCRIPTION - ORIENTATION
ORIENTATION: INNER
ORIENTATION: LEFT
ORIENTATION: INNER
ORIENTATION: INNER;OTHER (COMMENT)
ORIENTATION: INNER
ORIENTATION: INNER

## 2023-02-17 ASSESSMENT — PAIN DESCRIPTION - LOCATION
LOCATION: ABDOMEN;BACK
LOCATION: OTHER (COMMENT)
LOCATION: HEAD

## 2023-02-17 ASSESSMENT — PAIN DESCRIPTION - FREQUENCY
FREQUENCY: INTERMITTENT
FREQUENCY: CONTINUOUS
FREQUENCY: INTERMITTENT
FREQUENCY: INTERMITTENT

## 2023-02-17 NOTE — PROGRESS NOTES
[facility] ER Progress Note    Admitting Date and Time: 2/16/2023  3:33 AM  Admit Dx: Kidney stone [N20.0]  Ureteric stone [N20.1]  Nephrolithiasis [N20.0]  ERICKA (acute kidney injury) ([facility] [address] [N17.9]    Subjective:  Patient is being followed for Kidney stone [N20.0]  Ureteric stone [N20.1]  Nephrolithiasis [N20.0]  ERICKA (acute kidney injury) ([facility] [address] [N17.9]     Patient resting and sleeping in bed in no acute distress- Awakens easily  Reporting feeling nauseated and asking for nausea medication- nursing notified  Pain controlled  To OR this afternoon with urology        ROS: denies fever, chills, cp, sob, n/v, HA unless stated above. potassium chloride  20 mEq Oral Once    nicotine  1 patch TransDERmal Daily    sodium chloride flush  5-40 mL IntraVENous 2 times per day    tamsulosin  0.4 mg Oral Daily    metoprolol tartrate  50 mg Oral BID    pravastatin  40 mg Oral Nightly    levothyroxine  75 mcg Oral Daily    DULoxetine  60 mg Oral Daily    allopurinol  300 mg Oral Daily    fenofibrate  160 mg Oral Daily    cefTRIAXone (ROCEPHIN) IV  1,000 mg IntraVENous Q24H     ondansetron, 4 mg, Q6H PRN  ketorolac, 15 mg, Q6H PRN  morphine, 2 mg, Q3H PRN  HYDROcodone 5 mg - acetaminophen, 1 tablet, Q6H PRN  sodium chloride flush, 5-40 mL, PRN  sodium chloride, , PRN  ondansetron, 4 mg, Q8H PRN  polyethylene glycol, 17 g, Daily PRN  acetaminophen, 650 mg, Q6H PRN   Or  acetaminophen, 650 mg, Q6H PRN         Objective:    BP (!) 95/53   Pulse 71   Temp 98.5 °F (36.9 °C) (Oral)   Resp 18   Ht 5' 3\" (1.6 m)   Wt 215 lb (97.5 kg)   SpO2 92%   BMI 38.09 kg/m²     General Appearance: alert and oriented to person, place and time   Skin: warm and dry  Neck: neck supple and non tender without mass   Pulmonary/Chest: clear to auscultation bilaterally.    Cardiovascular: normal rate, normal S1 and S2 and no carotid bruits  Abdomen: soft, non-tender, non-distended, normal bowel sounds left CVA tenderness  Extremities: no cyanosis, no clubbing and no edema  Neurologic: speech normal            Recent Labs     02/15/23  0919 02/16/23  0513 02/17/23  0237    140 142   K 3.6 3.5 3.4*    102 106   CO2 27 27 27   BUN 15 18 16   CREATININE 0.8 1.2* 1.8*   GLUCOSE 128* 134* 103*   CALCIUM 9.8 9.3 8.5*       Recent Labs     02/15/23  0919 02/16/23  0513 02/17/23  0237   WBC 9.0 10.2 7.1   RBC 4.39 4.05 3.70   HGB 12.9 11.8 10.9*   HCT 39.6 36.9 34.8   MCV 90.2 91.1 94.1   MCH 29.4 29.1 29.5   MCHC 32.6 32.0 31.3*   RDW 12.8 12.9 13.1    130 81*   MPV 11.8 12.9* 14.7*         Assessment:    Principal Problem:    Kidney stone  Resolved Problems:    * No resolved hospital problems. *      Plan:  1.  8 mm mildly obstructing stone at the left UPJ:  pt returns to the ER for the 2nd time in 2 days. She originally presented to the ER with hematuria and left flank pain. She had imaging which revealed mildly obstructing. Multiple non obstructing stones seen on CT scan. She had no leukocytosis or lactic acidosis. Plan was for norco. Zofran and flomax with outpatient follow up with urology. She returned to the ER with worsening symptoms. Nausea/vomiting/ worsening flank pain/ urinary retention. Reporting not having a full stream of urine/ dribbling. Patient was able to urinate in the ER and declined a rutledge catheter. Afebrile/ no leukocytosis. Labs now showing ERICKA- creatinine elevated to 1.2. UA showing large blood and trace leukocytes. IVF and pain medicine. Urology following. Plans to OR today  for cysto. Pain controlled. Ongoing nausea- nursing to medicate. Monitor creatinine. 2.Nausea/ vomiting: due to above; Antiemetics     3. ERICKA; creatinine increased to 1.2- Was noted to be 0.8 day prior. . Rehydrate. Hold arb/ hctz for now. Avoid  nephrotoxic meds. Creatinine 1.8 today. 4. Possible UTI: rocephin started per urology- follow culture. 5. HLD: statin     6. Thyroid disease: synthroid     7.  Tobacco use: cessation advised- smokes 8 cigarettes daily. Pt requesting nicotine patch. 8. Sleep apnea: reporting her machine broke and she was using a friends. Will need outpatient polysomnography. 9. HTN: holding hyzaar- continue metoprolol. Monitor BP. Code Status: FULL  DVT prophylaxis: lovenox         NOTE: This report was transcribed using voice recognition software. Every effort was made to ensure accuracy; however, inadvertent computerized transcription errors may be present.   Electronically signed by SUSAN Hoff on 2/17/2023 at 8:43 AM

## 2023-02-17 NOTE — OP NOTE
Operative Note      Patient: Sarah Heard  YOB: 1960  MRN: [id number]    Date of Procedure: 2/17/2023    Pre-Op Diagnosis: left Kidney stone [N20.0]    Post-Op Diagnosis: Same       Procedure(s):  CYSTOSCOPY RETROGRADE PYELOGRAM LEFT STENT INSERTION    Surgeon(s):  Jairo Duke MD    Assistant:   * No surgical staff found *    Anesthesia: Monitor Anesthesia Care    Estimated Blood Loss (mL): Minimal    Complications: None    Specimens:   * No specimens in log *    Implants:  Implant Name Type Inv. Item Serial No.  Lot No. LRB No. Used Action   STENT URET 6FR L26CM PERCFLX HYDR+ DBL PGTL THRD 2 - [id number]  STENT URET 6FR L26CM PERCFLX HYDR+ DBL PGTL THRD 2  [employer] [facility] UROLOGY- [id number] Left 1 Implanted         Drains:   Ureteral Drain/Stent 02/17/23 Left Ureter (Active)       Findings: see op note    Detailed Description of Procedure:     Patient is a 42-year-old female who presented with left flank pain she was found to have a left UPJ stone. It was discussed with the patient different treatment options the patient elected for surgical management all risk benefits and alternatives were discussed with the patient informed consent was obtained and signed    Operation:    Patient was wheeled back in the operative suite. Upon entering the operative suite the patient was identified using the Embeda number. The patient was transferred the operative table and placed in supine position. Anesthesia was delivered without any complication a surgical timeout was taken all in the room agreement. 25 Jamaican cystoscope was inserted urethra atraumatically into the urinary bladder. We identified right and left ureteral orifice. A right retrograde pyelogram did not reveal any filling defects no hydronephrosis no hydroureter this was a normal right retrograde pyelogram on the left side the left retrograde pyelogram did reveal a filling defect in the proximal ureter.   An 035 wire was placed in the left ureter. There was a large amount of purulent urine which did emanate. At that time a 6 [place] by 26 double-J stent was then placed into the ureter and seen have good curl within the renal pelvis and urinary bladder patient's bladder was drained the stent was in good position the patient will require further ureteroscopy and laser lithotripsy in 2 weeks.   Patient was then awoke from anesthesia and taken the PACU in stable condition    Plan:    Stable for discharge from urologic standpoint  Will need definitive management of her stone in 2 weeks after infection is cleared    Electronically signed by Bandar Hamilton MD on 2/17/2023 at 4:18 PM

## 2023-02-17 NOTE — PROGRESS NOTES
[facility]  ([facility])      PROGRESS NOTE         PATIENT NAME: Bradley Flannery   YOB: 1960  ADMISSION DATE: 2/16/2023  3:33 AM   TODAY'S DATE: 2/17/2023        Subjective       Still with pain and nausea and emesis. Going for stent today.  at bedside      Objective     VS:   BP (!) 118/58   Pulse 73   Temp 98.3 °F (36.8 °C) (Oral)   Resp 18   Ht 5' 3\" (1.6 m)   Wt 215 lb (97.5 kg)   SpO2 98%   BMI 38.09 kg/m²     I & O - 24hr:    Intake/Output Summary (Last 24 hours) at 2/17/2023 1545  Last data filed at 2/17/2023 0618  Gross per 24 hour   Intake 250 ml   Output 250 ml   Net 0 ml         Physical Exam:  General:  Neck:  Resp:  Abdomen:   No acute distress  Supple  Normal effort  Soft, non-tender, nondistended   :  Defer to OR   Skin: Skin color, texture, turgor normal, no rashes or lesions       Labs and Imaging Studies   Labs:    CBC:   Recent Labs     02/15/23  0919 02/16/23  0513 02/17/23  0237   WBC 9.0 10.2 7.1   HGB 12.9 11.8 10.9*   HCT 39.6 36.9 34.8   MCV 90.2 91.1 94.1    130 81*     BMP:  Recent Labs     02/15/23  0919 02/16/23  0513 02/17/23  0237    140 142   K 3.6 3.5 3.4*    102 106   CO2 27 27 27   BUN 15 18 16   CREATININE 0.8 1.2* 1.8*       Magnesium:   Lab Results   Component Value Date/Time    MG 2.0 02/17/2023 02:37 AM      Phosphate: No results found for: PHOS  PT/INR: No results for input(s): PROTIME, INR in the last 72 hours.     U/A:   Lab Results   Component Value Date/Time    LEUKOCYTESUR TRACE 02/16/2023 05:13 AM    PHUR 6.5 02/16/2023 05:13 AM    WBCUA 1-3 02/16/2023 05:13 AM    WBCUA NONE 09/13/2011 01:30 PM    RBCUA >20 02/16/2023 05:13 AM    RBCUA NONE 09/13/2011 01:30 PM    BACTERIA NONE SEEN 02/16/2023 05:13 AM    SPECGRAV 1.020 02/16/2023 05:13 AM    BLOODU LARGE 02/16/2023 05:13 AM    GLUCOSEU Negative 02/16/2023 05:13 AM    GLUCOSEU NEGATIVE 09/13/2011 01:30 PM       Urine Culture: No results found for: LABURIN     Blood Culture:     Imaging Studies:          Assessment and Plan       Assessment/plan:  8 mm left UPJ calculi  Left hydronephrosis  ERICKA  Bilateral nonobstructing renal calculi     Continue to watch the creatinine  UA reviewed  Start Rocephin  CT abdomen pelvis reviewed shows a 8 mm left UPJ calculi  N.p.o.    We will proceed to OR  for cystoscopy, retrograde pyelogram, left stent insertion  Will need definitive treatment at a later date      Pema Oliva MD  [facility]  2/17/2023  3:45 PM

## 2023-02-17 NOTE — ANESTHESIA PRE PROCEDURE
Department of Anesthesiology  Preprocedure Note       Name:  Roma Dias   Age:  62 y.o.  :  1960                                          MRN:  [id number]         Date:  2023      Surgeon: Surgeon(s):  Barber Whittington MD    Procedure: Procedure(s):  CYSTOSCOPY RETROGRADE PYELOGRAM LEFT STENT INSERTION    Medications prior to admission:   Prior to Admission medications    Medication Sig Start Date End Date Taking? Authorizing Provider   tamsulosin (FLOMAX) 0.4 MG capsule Take 1 capsule by mouth daily for 10 days 2/15/23 2/25/23  Benny Marie,    ondansetron (ZOFRAN-ODT) 4 MG disintegrating tablet Take 1 tablet by mouth 3 times daily as needed for Nausea or Vomiting 2/15/23   Benny Marie DO   pravastatin (PRAVACHOL) 40 MG tablet 1 po qd q hs 10/3/22   Lg Figueroa DO   metoprolol tartrate (LOPRESSOR) 50 MG tablet Take 1 tablet by mouth 2 times daily 10/3/22   Lg Figueroa DO   losartan-hydroCHLOROthiazide (HYZAAR) 100-25 MG per tablet Take 1 tablet daily 10/3/22   Lg Figueroa DO   levothyroxine (EUTHYROX) 75 MCG tablet Take 1 tablet by mouth once daily 10/3/22   Lg Figueroa DO   fenofibrate (TRIGLIDE) 160 MG tablet 1 po qd 10/3/22   Lg Figueroa DO   DULoxetine (CYMBALTA) 60 MG extended release capsule Take 1 tablet daily 10/3/22   Lg Figueroa DO   allopurinol (ZYLOPRIM) 300 MG tablet 1 po qd 10/3/22   Lg Figueroa DO   nystatin (MYCOSTATIN) 476182 UNIT/GM powder Apply 3 times daily. 22   Opal Boland PA-C   clobetasol (TEMOVATE) 0.05 % cream Apply topically 2 times daily as needed (psoriasis) For psoriasis 22   Opal Boland PA-C   Ascorbic Acid (VITAMIN C WITH RICARDO HIPS) 500 MG tablet     Historical Provider, MD   Glucosamine-Chondroitin (GLUCOSAMINE CHONDR COMPLEX PO) Take by mouth 2 times daily    Historical Provider, MD   clotrimazole-betamethasone (LOTRISONE) 1-0.05 % cream Apply topically 2 times daily.  Patient taking differently:  as needed Apply topically 2 times daily. 7/9/19   Henry Hernandez PA-C   clobetasol (TEMOVATE) 0.05 % ointment Apply 1 each topically 2 times daily Apply topically 2 times daily. Patient taking differently: Apply 1 each topically as needed Apply topically 2 times daily. 7/10/18   Henry Hernandez PA-C   albuterol sulfate HFA (VENTOLIN HFA) 108 (90 Base) MCG/ACT inhaler Inhale 2 puffs into the lungs every 6 hours as needed for Wheezing 12/14/17   Marvel Daigle MD   multivitamin [facility]) per tablet Take 1 tablet by mouth daily. Historical Provider, MD   Cholecalciferol (VITAMIN D) 2000 UNITS TABS Take 1,000 Units by mouth daily.     Historical Provider, MD       Current medications:    Current Facility-Administered Medications   Medication Dose Route Frequency Provider Last Rate Last Admin    0.9 % sodium chloride infusion   IntraVENous Continuous Vonna Cleverly,  mL/hr at 02/16/23 0912 New Bag at 02/16/23 0912    ondansetron (ZOFRAN) injection 4 mg  4 mg IntraVENous Q6H PRN Vonna Cleverly, APN   4 mg at 02/16/23 0913    ketorolac (TORADOL) injection 15 mg  15 mg IntraVENous Q6H PRN Vonna Cleverly, APN        morphine (PF) injection 2 mg  2 mg IntraVENous Q3H PRN Vonna Cleverly, APN   2 mg at 02/16/23 1755    HYDROcodone-acetaminophen (NORCO) 5-325 MG per tablet 1 tablet  1 tablet Oral Q6H PRN Vonna Cleverly, APN        nicotine (NICODERM CQ) 14 MG/24HR 1 patch  1 patch TransDERmal Daily Vonna Cleverly, APN   1 patch at 02/16/23 0618    sodium chloride flush 0.9 % injection 5-40 mL  5-40 mL IntraVENous 2 times per day Vonna Cleverly, APN        sodium chloride flush 0.9 % injection 5-40 mL  5-40 mL IntraVENous PRN Vonna Cleverly, APN        0.9 % sodium chloride infusion   IntraVENous PRN Vonna Cleverly, APN        ondansetron (ZOFRAN-ODT) disintegrating tablet 4 mg  4 mg Oral Q8H PRN Vonna Cleverly, APN        polyethylene glycol Gwendalyn Sarah) packet 17 g  17 g Oral Daily PRN Faheem Lamp, APN        acetaminophen (TYLENOL) tablet 650 mg  650 mg Oral Q6H PRN Faheem Lamp, APN        Or    acetaminophen (TYLENOL) suppository 650 mg  650 mg Rectal Q6H PRN Faheem Lamp, APN        tamsulosin (FLOMAX) capsule 0.4 mg  0.4 mg Oral Daily Faheem Lamp, APN        metoprolol tartrate (LOPRESSOR) tablet 50 mg  50 mg Oral BID Faheem Lamp, APN        pravastatin (PRAVACHOL) tablet 40 mg  40 mg Oral Nightly Faheem Lamp, APN        levothyroxine (SYNTHROID) tablet 75 mcg  75 mcg Oral Daily Faheem Lamp, APN        DULoxetine (CYMBALTA) extended release capsule 60 mg  60 mg Oral Daily Faheem Lamp, APN        allopurinol (ZYLOPRIM) tablet 300 mg  300 mg Oral Daily Faheem Lamp, APN        fenofibrate (TRIGLIDE) tablet 160 mg  160 mg Oral Daily Faheem Lamp, APN        cefTRIAXone (ROCEPHIN) 1,000 mg in sodium chloride flush 10 mL IV syringe  1,000 mg IntraVENous Q24H BRICE Acevedo - CNP   1,000 mg at 02/16/23 1307       Allergies: Allergies   Allergen Reactions    Biaxin [Clarithromycin] Other (See Comments)     psychosis    Levofloxacin Other (See Comments)     Patient unsure reaction       Problem List:    Patient Active Problem List   Diagnosis Code    Hypothyroidism E03.9    Hyperlipidemia E78.5    Angina pectoris ([facility] [address] I20.9    Arrhythmia I49.9    Chronic depression F32. A    CAD (coronary artery disease) I25.10    Elevated blood uric acid level E79.0    Mixed hyperlipidemia E78.2    Essential hypertension I10    DARRIN on CPAP G47.33, Z99.89    Acquired hypothyroidism E03.9    Chronic fatigue R53.82    Obesity (BMI 35.0-39.9 without comorbidity) E66.9    Stage 1 mild COPD by GOLD classification (HCC) J44.9    Cigarette nicotine dependence without complication X30.143    Kidney stone N20.0       Past Medical History:        Diagnosis Date    Angina     Anxiety attack     Arrhythmia     Symptomatic palpitatoins    CAD (coronary artery disease)     CAD (coronary artery disease)     Chronic depression     Fatigue     Hyperlipidemia     Hypertension     Hypothyroidism     Nephrolithiasis 2/16/2023    Obesity (BMI 35.0-39.9 without comorbidity) 12/27/2016    Osteoarthritis     Sleep apnea     Stage 1 mild COPD by GOLD classification ([facility] [address] 6/4/2018    Thyroid disease     hypothyroidism    Uncontrolled daytime somnolence        Past Surgical History:        Procedure Laterality Date    ABDOMINAL ADHESION SURGERY      and endometrioma removal   [facility] [place] Right     Right CTR  Dr. Calderon Him Dr. Arianna Maya      x 2    FINGER TRIGGER RELEASE Right     Right trigger thumb release Dr. Alyson Norton Left     Left trigger thumb release Dr. Jordy Kaiser (CERVIX STATUS UNKNOWN)      and BSO    OVARIAN CYST REMOVAL Left     UTERINE FIBROID SURGERY         Social History:    Social History     Tobacco Use    Smoking status: Every Day     Packs/day: 1.00     Years: 37.00     Pack years: 37.00     Types: Cigarettes     Start date: 7/9/2000    Smokeless tobacco: Never    Tobacco comments:     smokes between 6-8 cigarettes a day   Substance Use Topics    Alcohol use: Yes     Comment: glass of wine very rarely                                Ready to quit: Not Answered  Counseling given: Not Answered  Tobacco comments: smokes between 6-8 cigarettes a day      Vital Signs (Current):   Vitals:    02/16/23 0603 02/16/23 0859 02/16/23 1154 02/16/23 1902   BP: 128/69 114/65  (!) 112/59   Pulse: 90 73  71   Resp:  18 16 19   Temp:  97.4 °F (36.3 °C)  98.1 °F (36.7 °C)   TempSrc:  Oral  Oral   SpO2:  93%  93%   Weight:       Height:                                                  BP Readings from Last 3 Encounters:   02/16/23 (!) 112/59   02/15/23 (!) 104/59 10/03/22 122/80       NPO Status:                                                                                 BMI:   Wt Readings from Last 3 Encounters:   02/16/23 217 lb (98.4 kg)   02/15/23 217 lb (98.4 kg)   10/03/22 210 lb (95.3 kg)     Body mass index is 38.44 kg/m². CBC:   Lab Results   Component Value Date/Time    WBC 10.2 02/16/2023 05:13 AM    RBC 4.05 02/16/2023 05:13 AM    HGB 11.8 02/16/2023 05:13 AM    HCT 36.9 02/16/2023 05:13 AM    MCV 91.1 02/16/2023 05:13 AM    RDW 12.9 02/16/2023 05:13 AM     02/16/2023 05:13 AM       CMP:   Lab Results   Component Value Date/Time     02/16/2023 05:13 AM    K 3.5 02/16/2023 05:13 AM     02/16/2023 05:13 AM    CO2 27 02/16/2023 05:13 AM    BUN 18 02/16/2023 05:13 AM    CREATININE 1.2 02/16/2023 05:13 AM    GFRAA >60 10/03/2022 11:19 AM    LABGLOM 50 02/16/2023 05:13 AM    GLUCOSE 134 02/16/2023 05:13 AM    GLUCOSE 96 09/13/2011 01:05 PM    PROT 7.0 02/16/2023 05:13 AM    CALCIUM 9.3 02/16/2023 05:13 AM    BILITOT 0.4 02/16/2023 05:13 AM    ALKPHOS 56 02/16/2023 05:13 AM    AST 23 02/16/2023 05:13 AM    ALT 22 02/16/2023 05:13 AM       POC Tests: No results for input(s): POCGLU, POCNA, POCK, POCCL, POCBUN, POCHEMO, POCHCT in the last 72 hours. Coags:   Lab Results   Component Value Date/Time    PROTIME 11.8 08/28/2015 11:59 PM    PROTIME 11.5 09/13/2011 01:05 PM    INR 1.1 08/28/2015 11:59 PM    APTT 28.6 08/28/2015 11:59 PM       HCG (If Applicable): No results found for: PREGTESTUR, PREGSERUM, HCG, HCGQUANT     ABGs: No results found for: PHART, PO2ART, OFX7SGV, QQL9MCO, BEART, M5AHJXCV     Type & Screen (If Applicable):  No results found for: LABABO, LABRH    Drug/Infectious Status (If Applicable):  No results found for: HIV, HEPCAB    COVID-19 Screening (If Applicable): No results found for: COVID19      CT:  Impression   8 mm mildly obstructing stone at the left UPJ.  There is distension seen in   the left renal collecting system.  Multiple smaller nonobstructing stones   seen throughout the kidneys bilaterally.               Anesthesia Evaluation  Patient summary reviewed and Nursing notes reviewed no history of anesthetic complications:   Airway: Mallampati: II  TM distance: >3 FB   Neck ROM: full  Mouth opening: > = 3 FB   Dental: normal exam   (+) caps      Pulmonary: breath sounds clear to auscultation  (+) COPD (Oxygen saturation 93%): moderate,  sleep apnea: on CPAP,  current smoker (37 pyhx.)          Patient did not smoke on day of surgery. Cardiovascular:  Exercise tolerance: poor (<4 METS),   (+) hypertension: mild and no interval change, angina:, CAD: no interval change, dysrhythmias:, CAMACHO: no interval change, hyperlipidemia        Rhythm: regular  Rate: normal           Beta Blocker:  Dose within 24 Hrs      ROS comment: EKG 2020:  Sinus  Bradycardia   Low voltage in precordial leads. Old inferior-apical infarct  -Old anterior infarct  -Left axis secondary to infarct -consider anterior fascicular block. Nonspecific T-abnormality. Neuro/Psych:   (+) psychiatric history:             ROS comment: Chronic fatigue GI/Hepatic/Renal:   (+) renal disease (1.2/50): kidney stones and ARF,          ROS comment: Obesity with a BMI of 38.5 kg/m2. Endo/Other:    (+) hypothyroidism::., .          Pt had no PAT visit        ROS comment: Gout Abdominal:   (+) obese,           Vascular: negative vascular ROS. Other Findings:           Anesthesia Plan      MAC     ASA 3       Induction: intravenous. MIPS: Postoperative opioids intended and Prophylactic antiemetics administered. Anesthetic plan and risks discussed with patient. Plan discussed with CRNA. Nick Claire DO   2/16/2023    History, data, and pertinent studies from chart review. Above represents information available via the shared medical record including previous anesthetic, medication, and allergy history. Confirmation of above and final disposition per DOS anesthesiologist.    Hayden Jim DO  Staff Anesthesiologist  February 16, 2023  7:05 PM    Pt seen, examined, chart reviewed, plan discussed.   Deuce Edwards MD  2/17/2023  3:53 PM

## 2023-02-17 NOTE — PROGRESS NOTES
P Quality Flow/Interdisciplinary Rounds Progress Note        Quality Flow Rounds held on February 17, 2023    Disciplines Attending:  Bedside Nurse, , , and Nursing Unit Leadership    Nestor Gooden was admitted on 2/16/2023  3:33 AM    Anticipated Discharge Date:       Disposition:    Richard Score:  Richard Scale Score: 21    Readmission Risk              Risk of Unplanned Readmission:  10           Discussed patient goal for the day, patient clinical progression, and barriers to discharge.   The following Goal(s) of the Day/Commitment(s) have been identified:  Diagnostics - Report Results and Labs - Report Results      Guille Auguste RN  February 17, 2023

## 2023-02-17 NOTE — CARE COORDINATION
Introduced my self and provided explanation of CM role to patient and  at bedside. Patient is awake, alert, and aware of current diagnosis and treatment plan including cysto today per urology. She voices she resides at home with her spouse and completes her adl's with independence. Patient is established with a pcp and denies any issue with retail pharmaceutical coverage. Patient verbalizes no concerns and identifies no areas to focus on nor barriers to discharge. Explained ELOS of 24-48 hours; patient voiced understanding and agreement. Will follow along with  and assist with discharge planning as necessary. Case Management Assessment  Initial Evaluation    Date/Time of Evaluation: 2/17/2023 11:32 AM  Assessment Completed by: Arabella Macias RN    If patient is discharged prior to next notation, then this note serves as note for discharge by case management. Patient Name: Sarah Heard                   YOB: 1960  Diagnosis: Kidney stone [N20.0]  Ureteric stone [N20.1]  Nephrolithiasis [N20.0]  ERICKA (acute kidney injury) ([facility] [address] [N17.9]                   Date / Time: 2/16/2023  3:33 AM    Patient Admission Status: Inpatient   Readmission Risk (Low < 19, Mod (19-27), High > 27): Readmission Risk Score: 10.6    Current PCP: Bee Peck, DO  PCP verified by CM? Yes    Chart Reviewed: Yes      History Provided by: Patient  Patient Orientation: Alert and Oriented, Person, Place, Situation, Self    Patient Cognition: Alert    Hospitalization in the last 30 days (Readmission):  No    If yes, Readmission Assessment in CM Navigator will be completed.     Advance Directives:      Code Status: Full Code   Patient's Primary Decision Maker is: Legal Next of Kin      Discharge Planning:    Patient lives with: Spouse/Significant Other Type of Home: House  Primary Care Giver: Self  Patient Support Systems include: Spouse/Significant Other   Current Financial resources: Current community resources:    Current services prior to admission: None            Current DME:              Type of Home Care services:  None    ADLS  Prior functional level: Independent in ADLs/IADLs  Current functional level: Independent in ADLs/IADLs    PT AM-PAC:   /24  OT AM-PAC:   /24    Family can provide assistance at DC: Yes  Would you like Case Management to discuss the discharge plan with any other family members/significant others, and if so, who? Yes (, Nell Leader)  Plans to Return to Present Housing: Yes  Other Identified Issues/Barriers to RETURNING to current housing: None  Potential Assistance needed at discharge: N/A            Potential DME:    Patient expects to discharge to: [address] [place] [address] for transportation at discharge:      Financial    Payor: Camila Millan / Plan: Camila Millan / Product Type: *No Product type* /     Does insurance require precert for SNF: Yes    Potential assistance Purchasing Medications: No  Meds-to-Beds request: Yes      [address] [phone] - F [address] [place] [address]  Phone: [phone] Fax: [phone]      Notes:    Factors facilitating achievement of predicted outcomes: Family support, Cooperative, and Pleasant    Barriers to discharge: Pain and Medical complications - follow creatinine    Additional Case Management Notes: see above    The Plan for Transition of Care is related to the following treatment goals of Kidney stone [N20.0]  Ureteric stone [N20.1]  Nephrolithiasis [N20.0]  ERICKA (acute kidney injury) ([facility] [address] [N60.8]    IF APPLICABLE: The Patient and/or patient representative Hardik Love and her family were provided with a choice of provider and agrees with the discharge plan.  Freedom of choice list with basic dialogue that supports the patient's individualized plan of care/goals and shares the quality data associated with the providers was provided to:     Patient Representative Name:       The Patient and/or Patient Representative Agree with the Discharge Plan?       Arcelia Borges RN  Case Management Department  Ph: [phone] Fax: [phone]

## 2023-02-17 NOTE — ANESTHESIA POSTPROCEDURE EVALUATION
Department of Anesthesiology  Postprocedure Note    Patient: Abelina Lefort  MRN: [id number]  YOB: 1960  Date of evaluation: 2/17/2023      Procedure Summary     Date: 02/17/23 Room / Location: [facility] OR 06 / [facility]    Anesthesia Start: 9541 Anesthesia Stop:     Procedure: CYSTOSCOPY RETROGRADE PYELOGRAM LEFT STENT INSERTION (Left: Ureter) Diagnosis:       Kidney stone      (Kidney stone [N20.0])    Surgeons: Danielle Camarillo MD Responsible Provider: Leslie Pang MD    Anesthesia Type: MAC ASA Status: 3          Anesthesia Type: No value filed.     Jenny Phase I: Jenny Score: 10    Jenny Phase II:        Anesthesia Post Evaluation    Patient location during evaluation: PACU  Patient participation: complete - patient participated  Level of consciousness: awake  Airway patency: patent  Nausea & Vomiting: no nausea and no vomiting  Complications: no  Cardiovascular status: hemodynamically stable  Respiratory status: acceptable  Hydration status: euvolemic

## 2023-02-18 VITALS
DIASTOLIC BLOOD PRESSURE: 64 MMHG | TEMPERATURE: 97.8 F | OXYGEN SATURATION: 94 % | BODY MASS INDEX: 39.34 KG/M2 | HEIGHT: 63 IN | RESPIRATION RATE: 16 BRPM | WEIGHT: 222 LBS | HEART RATE: 56 BPM | SYSTOLIC BLOOD PRESSURE: 127 MMHG

## 2023-02-18 LAB
ANION GAP SERPL CALCULATED.3IONS-SCNC: 7 MMOL/L (ref 7–16)
BUN BLDV-MCNC: 15 MG/DL (ref 6–23)
CALCIUM SERPL-MCNC: 8.7 MG/DL (ref 8.6–10.2)
CHLORIDE BLD-SCNC: 106 MMOL/L (ref 98–107)
CO2: 27 MMOL/L (ref 22–29)
CREAT SERPL-MCNC: 1.1 MG/DL (ref 0.5–1)
GFR SERPL CREATININE-BSD FRML MDRD: 56 ML/MIN/1.73
GLUCOSE BLD-MCNC: 168 MG/DL (ref 74–99)
HCT VFR BLD CALC: 36.7 % (ref 34–48)
HEMOGLOBIN: 11.6 G/DL (ref 11.5–15.5)
MCH RBC QN AUTO: 29.7 PG (ref 26–35)
MCHC RBC AUTO-ENTMCNC: 31.6 % (ref 32–34.5)
MCV RBC AUTO: 93.9 FL (ref 80–99.9)
PDW BLD-RTO: 12.8 FL (ref 11.5–15)
PLATELET # BLD: 92 E9/L (ref 130–450)
PLATELET CONFIRMATION: NORMAL
PMV BLD AUTO: ABNORMAL FL (ref 7–12)
POTASSIUM SERPL-SCNC: 3.8 MMOL/L (ref 3.5–5)
RBC # BLD: 3.91 E12/L (ref 3.5–5.5)
SODIUM BLD-SCNC: 140 MMOL/L (ref 132–146)
WBC # BLD: 6 E9/L (ref 4.5–11.5)

## 2023-02-18 PROCEDURE — 85027 COMPLETE CBC AUTOMATED: CPT

## 2023-02-18 PROCEDURE — 99239 HOSP IP/OBS DSCHRG MGMT >30: CPT | Performed by: STUDENT IN AN ORGANIZED HEALTH CARE EDUCATION/TRAINING PROGRAM

## 2023-02-18 PROCEDURE — 6370000000 HC RX 637 (ALT 250 FOR IP): Performed by: NURSE PRACTITIONER

## 2023-02-18 PROCEDURE — 6360000002 HC RX W HCPCS: Performed by: NURSE PRACTITIONER

## 2023-02-18 PROCEDURE — APPSS45 APP SPLIT SHARED TIME 31-45 MINUTES: Performed by: NURSE PRACTITIONER

## 2023-02-18 PROCEDURE — 2580000003 HC RX 258: Performed by: NURSE PRACTITIONER

## 2023-02-18 PROCEDURE — 80048 BASIC METABOLIC PNL TOTAL CA: CPT

## 2023-02-18 PROCEDURE — 36415 COLL VENOUS BLD VENIPUNCTURE: CPT

## 2023-02-18 RX ORDER — CEFDINIR 300 MG/1
300 CAPSULE ORAL 2 TIMES DAILY
Qty: 16 CAPSULE | Refills: 0 | Status: SHIPPED | OUTPATIENT
Start: 2023-02-18 | End: 2023-02-24 | Stop reason: ALTCHOICE

## 2023-02-18 RX ORDER — CEFDINIR 300 MG/1
300 CAPSULE ORAL 2 TIMES DAILY
Qty: 10 CAPSULE | Refills: 0 | Status: SHIPPED | OUTPATIENT
Start: 2023-02-18 | End: 2023-02-18 | Stop reason: SDUPTHER

## 2023-02-18 RX ADMIN — MORPHINE SULFATE 2 MG: 2 INJECTION, SOLUTION INTRAMUSCULAR; INTRAVENOUS at 00:28

## 2023-02-18 RX ADMIN — HYDROCODONE BITARTRATE AND ACETAMINOPHEN 1 TABLET: 5; 325 TABLET ORAL at 09:31

## 2023-02-18 RX ADMIN — TAMSULOSIN HYDROCHLORIDE 0.4 MG: 0.4 CAPSULE ORAL at 09:27

## 2023-02-18 RX ADMIN — LEVOTHYROXINE SODIUM 75 MCG: 75 TABLET ORAL at 05:43

## 2023-02-18 RX ADMIN — METOPROLOL TARTRATE 50 MG: 50 TABLET ORAL at 09:27

## 2023-02-18 RX ADMIN — SODIUM CHLORIDE: 9 INJECTION, SOLUTION INTRAVENOUS at 05:46

## 2023-02-18 RX ADMIN — FENOFIBRATE 160 MG: 160 TABLET ORAL at 09:27

## 2023-02-18 RX ADMIN — SODIUM CHLORIDE, PRESERVATIVE FREE 10 ML: 5 INJECTION INTRAVENOUS at 09:28

## 2023-02-18 RX ADMIN — ALLOPURINOL 300 MG: 300 TABLET ORAL at 09:27

## 2023-02-18 RX ADMIN — DULOXETINE HYDROCHLORIDE 60 MG: 60 CAPSULE, DELAYED RELEASE ORAL at 09:26

## 2023-02-18 RX ADMIN — HYDROCODONE BITARTRATE AND ACETAMINOPHEN 1 TABLET: 5; 325 TABLET ORAL at 03:44

## 2023-02-18 RX ADMIN — MORPHINE SULFATE 2 MG: 2 INJECTION, SOLUTION INTRAMUSCULAR; INTRAVENOUS at 12:56

## 2023-02-18 RX ADMIN — SODIUM CHLORIDE, PRESERVATIVE FREE 1000 MG: 5 INJECTION INTRAVENOUS at 12:56

## 2023-02-18 ASSESSMENT — PAIN SCALES - GENERAL
PAINLEVEL_OUTOF10: 7
PAINLEVEL_OUTOF10: 3
PAINLEVEL_OUTOF10: 2
PAINLEVEL_OUTOF10: 2
PAINLEVEL_OUTOF10: 8
PAINLEVEL_OUTOF10: 7

## 2023-02-18 ASSESSMENT — PAIN DESCRIPTION - LOCATION
LOCATION: ABDOMEN
LOCATION: PELVIS

## 2023-02-18 ASSESSMENT — PAIN DESCRIPTION - DESCRIPTORS
DESCRIPTORS: SHARP
DESCRIPTORS: ACHING;DISCOMFORT;SORE
DESCRIPTORS: ACHING;HEAVINESS
DESCRIPTORS: CRAMPING;BURNING

## 2023-02-18 ASSESSMENT — PAIN - FUNCTIONAL ASSESSMENT
PAIN_FUNCTIONAL_ASSESSMENT: PREVENTS OR INTERFERES SOME ACTIVE ACTIVITIES AND ADLS
PAIN_FUNCTIONAL_ASSESSMENT: PREVENTS OR INTERFERES SOME ACTIVE ACTIVITIES AND ADLS

## 2023-02-18 ASSESSMENT — PAIN DESCRIPTION - ORIENTATION
ORIENTATION: LEFT;LOWER
ORIENTATION: LEFT;RIGHT;LOWER
ORIENTATION: MID;LOWER
ORIENTATION: LEFT;ANTERIOR

## 2023-02-18 NOTE — PROGRESS NOTES
[facility]  ([facility])      PROGRESS NOTE         PATIENT NAME: Jeffery Yu OF BIRTH: 1960  ADMISSION DATE: 2/16/2023  3:33 AM   TODAY'S DATE: 2/18/2023        Subjective       Doing well. Feeling much better. Is having some bladder spasms  Overall much improved from yesterday. Left stent placed yesterday      Objective     VS:   BP (!) 117/57   Pulse 67   Temp 97.8 °F (36.6 °C) (Oral)   Resp 16   Ht 5' 3\" (1.6 m)   Wt 222 lb (100.7 kg)   SpO2 94%   BMI 39.33 kg/m²     I & O - 24hr:    Intake/Output Summary (Last 24 hours) at 2/18/2023 0931  Last data filed at 2/18/2023 0544  Gross per 24 hour   Intake 120 ml   Output 700 ml   Net -580 ml         Physical Exam:  General:  Neck:  Resp:  Abdomen:   No acute distress  Supple  Normal effort  Soft, non-tender, nondistended   :  defer   Skin: Skin color, texture, turgor normal, no rashes or lesions       Labs and Imaging Studies   Labs:    CBC:   Recent Labs     02/16/23  0513 02/17/23  0237 02/18/23  0235   WBC 10.2 7.1 6.0   HGB 11.8 10.9* 11.6   HCT 36.9 34.8 36.7   MCV 91.1 94.1 93.9    81* 92*     BMP:  Recent Labs     02/16/23  0513 02/17/23  0237 02/18/23  0235    142 140   K 3.5 3.4* 3.8    106 106   CO2 27 27 27   BUN 18 16 15   CREATININE 1.2* 1.8* 1.1*       Magnesium:   Lab Results   Component Value Date/Time    MG 2.0 02/17/2023 02:37 AM      Phosphate: No results found for: PHOS  PT/INR: No results for input(s): PROTIME, INR in the last 72 hours.     U/A:   Lab Results   Component Value Date/Time    LEUKOCYTESUR TRACE 02/16/2023 05:13 AM    PHUR 6.5 02/16/2023 05:13 AM    WBCUA 1-3 02/16/2023 05:13 AM    WBCUA NONE 09/13/2011 01:30 PM    RBCUA >20 02/16/2023 05:13 AM    RBCUA NONE 09/13/2011 01:30 PM    BACTERIA NONE SEEN 02/16/2023 05:13 AM    SPECGRAV 1.020 02/16/2023 05:13 AM    BLOODU LARGE 02/16/2023 05:13 AM    GLUCOSEU Negative 02/16/2023 05:13 AM    GLUCOSEU NEGATIVE 09/13/2011 01:30 PM Urine Culture: No results found for: LABURIN     Blood Culture:     Imaging Studies:          Assessment and Plan       ASSESMENT:  56 y/o F s/p cystoscopy and left stent for 8 mm left UPJ calculus    PLAN:  Stable for DC. No further inpatient  intervention. Will need definitive management of stone as outpatient. Will arrange for follow up   Call with any questions. Would send home on Abx.          Ирина Vaughn MD  [facility]  2/18/2023  9:31 AM

## 2023-02-18 NOTE — PLAN OF CARE
Problem: Discharge Planning  Goal: Discharge to home or other facility with appropriate resources  2/18/2023 1314 by Tita Burkett RN  Outcome: Completed     Problem: Safety - Adult  Goal: Free from fall injury  2/18/2023 1314 by Tita Burkett RN  Outcome: Completed     Problem: Pain  Goal: Verbalizes/displays adequate comfort level or baseline comfort level  2/18/2023 1314 by Tita Burkett RN  Outcome: Completed

## 2023-02-18 NOTE — DISCHARGE SUMMARY
[facility] Physician Discharge Summary       MD Americo Uriarte [address] [place] [address] [facility]    Follow up      Jennie Mccarty [place]  [phone]    Schedule an appointment as soon as possible for a visit      Activity level: As tolerated     Dispo: Home    Condition on discharge: Stable     Patient ID:  Dwayne Salazar  [id number]  89 y.o.  1960    Admit date: 2/16/2023    Discharge date and time:  2/18/2023  2:30 PM    Admission Diagnoses:   Principal Problem:    Kidney stone  Resolved Problems:    * No resolved hospital problems. *      Discharge Diagnoses:   Principal Problem:    Kidney stone  Resolved Problems:    * No resolved hospital problems. *      Consults:  IP CONSULT TO UROLOGY    Hospital Course:   Patient Dwayne Salazar is a 57 y.o. presented with Kidney stone [N20.0]  Ureteric stone [N20.1]  Nephrolithiasis [N20.0]  ERICKA (acute kidney injury) ([facility] [address] [N17.9]   Patient presented to the ER with known kidney stone and worsening symptoms. Urology was consulted. Patient was followed and treated for;    1.  8 mm mildly obstructing stone at the left UPJ:  pt returns to the ER for the 2nd time in 2 days. She originally presented to the ER with hematuria and left flank pain. She had imaging which revealed mildly obstructing. Multiple non obstructing stones seen on CT scan. She had no leukocytosis or lactic acidosis. Plan was for norco. Zofran and flomax with outpatient follow up with urology. She returned to the ER with worsening symptoms. Nausea/vomiting/ worsening flank pain/ urinary retention. Reporting not having a full stream of urine/ dribbling. Patient was able to urinate in the ER and declined a rutledge catheter. Afebrile/ no leukocytosis. Labs now showing ERICKA- creatinine elevated to 1.2. UA showing large blood and trace leukocytes. IVF and pain medicine. Urology following. S/p OR yesterday with urology-stent placed. Will need outpt management of stone with urology. Continue po abx- op note reporting purulent urine. Pt feeling good today. Tolerating diet and pain better. Plans to discharge this afternoon. Outpt follow up with PCP and Urology. 2.Nausea/ vomiting: due to above; Antiemetics     3. ERICKA; creatinine increased to 1.2- Was noted to be 0.8 day prior. . Rehydrate. Hold arb/ hctz for now. Avoid  nephrotoxic meds. Creatinine 1.8 today. 4. Suspected  UTI: from above. IV rocephin inpt- changed to po cefinir for total of 10 days- discussed with attending. 5. HLD: statin     6. Thyroid disease: synthroid     7. Tobacco use: cessation advised- smokes 8 cigarettes daily. 8. Sleep apnea: reporting her machine broke and she was using a friends. Will need outpatient polysomnography. 9. HTN: holding hyzaar- continue metoprolol. Monitor BP. Discussed with pt will keep hyzaar on hold. Will need BP check in 1 week with PCP;      Patient discharged in stable condition with the following medications, instructions and follow up. Discharge Exam:  General Appearance: alert and oriented to person, place and time   Skin: warm and dry  Neck: neck supple and non tender without mass   Pulmonary/Chest: clear to auscultation bilaterally. Cardiovascular: normal rate, normal S1 and S2 and no carotid bruits  Abdomen: soft, non-tender, non-distended, normal bowel sounds left CVA tenderness  Extremities: no cyanosis, no clubbing and no edema  Neurologic: speech normal        I/O last 3 completed shifts: In: 130 [P.O.:120; I.V.:10]  Out: 950 [Urine:950]  I/O this shift:   In: 720 [P.O.:720]  Out: -       LABS:  Recent Labs     02/16/23 0513 02/17/23 0237 02/18/23 0235    142 140   K 3.5 3.4* 3.8    106 106   CO2 27 27 27   BUN 18 16 15   CREATININE 1.2* 1.8* 1.1*   GLUCOSE 134* 103* 168*   CALCIUM 9.3 8.5* 8.7       Recent Labs     02/16/23 0513 02/17/23 0237 02/18/23 0235   WBC 10.2 7.1 6.0   RBC 4.05 3.70 3.91   HGB 11.8 10.9* 11.6   HCT 36.9 34.8 36.7   MCV 91.1 94.1 93.9   MCH 29.1 29.5 29.7   MCHC 32.0 31.3* 31.6*   RDW 12.9 13.1 12.8    81* 92*   MPV 12.9* 14.7* NOT CALC       No results for input(s): POCGLU in the last 72 hours. Imaging:  FL RETROGRADE PYELOGRAM W WO KUB    Result Date: 2/17/2023  EXAMINATION: SPOT FLUOROSCOPIC IMAGES 2/17/2023 2:15 pm TECHNIQUE: Fluoroscopy was provided by the radiology department for procedure. Radiologist was not present during examination. FLUOROSCOPY DOSE AND TYPE: Radiation Exposure Index: Kerma mGy, 11.35 mGy COMPARISON: None HISTORY: ORDERING SYSTEM PROVIDED HISTORY: retrograde pyelogram TECHNOLOGIST PROVIDED HISTORY: Reason for exam:->retrograde pyelogram Intraprocedural imaging. FINDINGS: 4 spot images of the bilateral ureters were obtained. Left ureter stent is noted on the final image. Intraprocedural fluoroscopic spot images as above. See separate procedure report for more information. Patient Instructions:      Medication List        START taking these medications      cefdinir 300 MG capsule  Commonly known as: OMNICEF  Take 1 capsule by mouth 2 times daily for 8 days            CHANGE how you take these medications      clotrimazole-betamethasone 1-0.05 % cream  Commonly known as: Lotrisone  Apply topically 2 times daily.   What changed:   when to take this  reasons to take this            CONTINUE taking these medications      albuterol sulfate  (90 Base) MCG/ACT inhaler  Commonly known as: Ventolin HFA  Inhale 2 puffs into the lungs every 6 hours as needed for Wheezing     allopurinol 300 MG tablet  Commonly known as: ZYLOPRIM  1 po qd     * clobetasol 0.05 % cream  Commonly known as: TEMOVATE  Apply topically 2 times daily as needed (psoriasis) For psoriasis     DULoxetine 60 MG extended release capsule  Commonly known as: CYMBALTA  Take 1 tablet daily     fenofibrate 160 MG tablet  Commonly known as: TRIGLIDE  1 po qd GLUCOSAMINE CHONDR COMPLEX PO     levothyroxine 75 MCG tablet  Commonly known as: Euthyrox  Take 1 tablet by mouth once daily     metoprolol tartrate 50 MG tablet  Commonly known as: LOPRESSOR  Take 1 tablet by mouth 2 times daily     multivitamin per tablet     nystatin 225352 UNIT/GM powder  Commonly known as: MYCOSTATIN  Apply 3 times daily. ondansetron 4 MG disintegrating tablet  Commonly known as: ZOFRAN-ODT  Take 1 tablet by mouth 3 times daily as needed for Nausea or Vomiting     pravastatin 40 MG tablet  Commonly known as: PRAVACHOL  1 po qd q hs     tamsulosin 0.4 MG capsule  Commonly known as: Flomax  Take 1 capsule by mouth daily for 10 days     vitamin C with jerome hips 500 MG tablet     vitamin D 50 MCG (2000 UT) Tabs tablet  Commonly known as: CHOLECALCIFEROL           * This list has 1 medication(s) that are the same as other medications prescribed for you. Read the directions carefully, and ask your doctor or other care provider to review them with you. STOP taking these medications      diclofenac sodium 1 % Gel  Commonly known as: VOLTAREN     HYDROcodone-acetaminophen 5-325 MG per tablet  Commonly known as: Norco     ketoconazole 2 % cream  Commonly known as: NIZORAL     losartan-hydroCHLOROthiazide 100-25 MG per tablet  Commonly known as: HYZAAR     sucralfate 1 GM tablet  Commonly known as: Carafate            ASK your doctor about these medications      * clobetasol 0.05 % ointment  Commonly known as: TEMOVATE  Apply 1 each topically 2 times daily Apply topically 2 times daily. * This list has 1 medication(s) that are the same as other medications prescribed for you. Read the directions carefully, and ask your doctor or other care provider to review them with you.                    Where to Get Your Medications        These medications were sent to [facility]  [address] [place] [address], [facility] [address]      Phone: [phone]   cefdinir 300 MG capsule           Note that more than 30 minutes was spent in preparing discharge papers, discussing discharge with patient, medication review, etc.    Signed:  Electronically signed by SUSAN Ackerman on 2/18/2023 at 2:30 PM

## 2023-02-18 NOTE — DISCHARGE INSTRUCTIONS
You will need another procedure. [facility] will call you to set this up in the next 2 weeks. If any questions feel free to call out office. Call upon discharge to schedule a follow-up visit with Gabino Mcmahon[facility] ([facility]) at [phone]

## 2023-02-19 LAB — URINE CULTURE, ROUTINE: NORMAL

## 2023-02-20 LAB — URINE CULTURE, ROUTINE: NORMAL

## 2023-02-24 ENCOUNTER — OFFICE VISIT (OUTPATIENT)
Dept: PRIMARY CARE CLINIC | Age: 63
End: 2023-02-24
Payer: MEDICAID

## 2023-02-24 VITALS
SYSTOLIC BLOOD PRESSURE: 148 MMHG | DIASTOLIC BLOOD PRESSURE: 84 MMHG | BODY MASS INDEX: 37.91 KG/M2 | OXYGEN SATURATION: 98 % | HEART RATE: 82 BPM | TEMPERATURE: 97.5 F | WEIGHT: 214 LBS

## 2023-02-24 DIAGNOSIS — I10 ESSENTIAL HYPERTENSION: ICD-10-CM

## 2023-02-24 DIAGNOSIS — N20.0 NEPHROLITHIASIS: ICD-10-CM

## 2023-02-24 DIAGNOSIS — M79.89 LEG SWELLING: Primary | ICD-10-CM

## 2023-02-24 PROBLEM — J44.9 STAGE 1 MILD COPD BY GOLD CLASSIFICATION (HCC): Status: RESOLVED | Noted: 2018-06-04 | Resolved: 2023-02-24

## 2023-02-24 PROCEDURE — 99214 OFFICE O/P EST MOD 30 MIN: CPT | Performed by: INTERNAL MEDICINE

## 2023-02-24 PROCEDURE — 4004F PT TOBACCO SCREEN RCVD TLK: CPT | Performed by: INTERNAL MEDICINE

## 2023-02-24 PROCEDURE — 3017F COLORECTAL CA SCREEN DOC REV: CPT | Performed by: INTERNAL MEDICINE

## 2023-02-24 PROCEDURE — G8417 CALC BMI ABV UP PARAM F/U: HCPCS | Performed by: INTERNAL MEDICINE

## 2023-02-24 PROCEDURE — 3074F SYST BP LT 130 MM HG: CPT | Performed by: INTERNAL MEDICINE

## 2023-02-24 PROCEDURE — 3078F DIAST BP <80 MM HG: CPT | Performed by: INTERNAL MEDICINE

## 2023-02-24 PROCEDURE — G8427 DOCREV CUR MEDS BY ELIG CLIN: HCPCS | Performed by: INTERNAL MEDICINE

## 2023-02-24 PROCEDURE — 1111F DSCHRG MED/CURRENT MED MERGE: CPT | Performed by: INTERNAL MEDICINE

## 2023-02-24 PROCEDURE — G8484 FLU IMMUNIZE NO ADMIN: HCPCS | Performed by: INTERNAL MEDICINE

## 2023-02-24 RX ORDER — AMLODIPINE BESYLATE 5 MG/1
5 TABLET ORAL DAILY
Qty: 30 TABLET | Refills: 3 | Status: SHIPPED | OUTPATIENT
Start: 2023-02-24

## 2023-02-24 ASSESSMENT — PATIENT HEALTH QUESTIONNAIRE - PHQ9
7. TROUBLE CONCENTRATING ON THINGS, SUCH AS READING THE NEWSPAPER OR WATCHING TELEVISION: 0
1. LITTLE INTEREST OR PLEASURE IN DOING THINGS: 0
5. POOR APPETITE OR OVEREATING: 0
3. TROUBLE FALLING OR STAYING ASLEEP: 0
6. FEELING BAD ABOUT YOURSELF - OR THAT YOU ARE A FAILURE OR HAVE LET YOURSELF OR YOUR FAMILY DOWN: 0
9. THOUGHTS THAT YOU WOULD BE BETTER OFF DEAD, OR OF HURTING YOURSELF: 0
SUM OF ALL RESPONSES TO PHQ QUESTIONS 1-9: 0
4. FEELING TIRED OR HAVING LITTLE ENERGY: 0
SUM OF ALL RESPONSES TO PHQ QUESTIONS 1-9: 0
SUM OF ALL RESPONSES TO PHQ9 QUESTIONS 1 & 2: 0
SUM OF ALL RESPONSES TO PHQ QUESTIONS 1-9: 0
SUM OF ALL RESPONSES TO PHQ QUESTIONS 1-9: 0
8. MOVING OR SPEAKING SO SLOWLY THAT OTHER PEOPLE COULD HAVE NOTICED. OR THE OPPOSITE, BEING SO FIGETY OR RESTLESS THAT YOU HAVE BEEN MOVING AROUND A LOT MORE THAN USUAL: 0
2. FEELING DOWN, DEPRESSED OR HOPELESS: 0
10. IF YOU CHECKED OFF ANY PROBLEMS, HOW DIFFICULT HAVE THESE PROBLEMS MADE IT FOR YOU TO DO YOUR WORK, TAKE CARE OF THINGS AT HOME, OR GET ALONG WITH OTHER PEOPLE: 0

## 2023-02-24 NOTE — PROGRESS NOTES
Cale Krueger, a female of 57 y.o. presents today for Follow-Up from Hospital    Medications include allopurinol duloxetine fenofibrate ketoconazole levothyroxine losartan/hydrochlorothiazide Pravachol Carafate    Her medical history includes obesity sleep apnea hypercholesterolemia hypothyroidism essential hypertension CAD Gout    She follows with Dr. Ivie Bence Dr. Alvy Humbles Dr. Amy Estelle Dr. Lorriane Daisy    She was in the ER for a kidney stone. She has been following with Dr. Janice Caldwell. She had a stent placed. She may have a lithotripsy. Continue current medication regimen  Obtain routine blood work  Obtain mammogram  Obtain Cologuard testing     Diagnoses and all orders for this visit:  Leg swelling  Essential hypertension  Other orders  -     Compression Stockings MISC; by Does not apply route 15-25 mm hg  -     amLODIPine (NORVASC) 5 MG tablet; Take 1 tablet by mouth daily     Breast Cancer Screening due: Defer    Colon Cancer screening due: Defer    Plan    Start on Norvasc for uncontrolled blood pressure  Following with urology for nephrolithiasis  Start compression hosiery          BP (!) 148/84   Pulse 82   Temp 97.5 °F (36.4 °C)   Wt 214 lb (97.1 kg)   SpO2 98%   BMI 37.91 kg/m²     Review of Systems  Constitutional:Negative for activity change, appetite change, chills, fatigue and fever. Respiratory: Negative for choking, chest tightness, shortness of breath and wheezing. Cardiovascular: Negative for chest pain, palpitations and leg swelling. Gastrointestinal: Negative for abdominal distention, constipation, diarrhea, nausea and vomiting. Musculoskeletal: Negative for arthralgias, back pain, gait problem and joint swelling. Neurological: Negative for dizziness, weakness,numbness and headaches.      Patient Active Problem List    Diagnosis Date Noted    Kidney stone 02/16/2023    Cigarette nicotine dependence without complication 56/54/0496    Elevated blood uric acid level 12/27/2016    Mixed hyperlipidemia 12/27/2016    Essential hypertension 12/27/2016    DARRIN on CPAP 12/27/2016    Acquired hypothyroidism 12/27/2016    Chronic fatigue 12/27/2016    Obesity (BMI 35.0-39.9 without comorbidity) 12/27/2016    Chronic depression 06/27/2016    CAD (coronary artery disease) 06/27/2016    Angina pectoris ([address]     Arrhythmia     Hypothyroidism     Hyperlipidemia      Allergies   Allergen Reactions    Biaxin [Clarithromycin] Other (See Comments)     psychosis    Levofloxacin Other (See Comments)     Patient unsure reaction     Current Outpatient Medications on File Prior to Visit   Medication Sig Dispense Refill    tamsulosin (FLOMAX) 0.4 MG capsule Take 1 capsule by mouth daily for 10 days 10 capsule 0    ondansetron (ZOFRAN-ODT) 4 MG disintegrating tablet Take 1 tablet by mouth 3 times daily as needed for Nausea or Vomiting 30 tablet 0    pravastatin (PRAVACHOL) 40 MG tablet 1 po qd q hs 90 tablet 1    metoprolol tartrate (LOPRESSOR) 50 MG tablet Take 1 tablet by mouth 2 times daily 180 tablet 1    levothyroxine (EUTHYROX) 75 MCG tablet Take 1 tablet by mouth once daily 90 tablet 2    fenofibrate (TRIGLIDE) 160 MG tablet 1 po qd 90 tablet 2    DULoxetine (CYMBALTA) 60 MG extended release capsule Take 1 tablet daily 90 capsule 2    allopurinol (ZYLOPRIM) 300 MG tablet 1 po qd 90 tablet 1    nystatin (MYCOSTATIN) 900986 UNIT/GM powder Apply 3 times daily. 30 g 1    clobetasol (TEMOVATE) 0.05 % cream Apply topically 2 times daily as needed (psoriasis) For psoriasis 30 g 3    Ascorbic Acid (VITAMIN C WITH RICARDO HIPS) 500 MG tablet       Glucosamine-Chondroitin (GLUCOSAMINE CHONDR COMPLEX PO) Take by mouth 2 times daily      clotrimazole-betamethasone (LOTRISONE) 1-0.05 % cream Apply topically 2 times daily. (Patient taking differently: as needed Apply topically 2 times daily.) 30 g 1    clobetasol (TEMOVATE) 0.05 % ointment Apply 1 each topically 2 times daily Apply topically 2 times daily.  (Patient taking differently: Apply 1 each topically as needed Apply topically 2 times daily.) 45 g 5    albuterol sulfate HFA (VENTOLIN HFA) 108 (90 Base) MCG/ACT inhaler Inhale 2 puffs into the lungs every 6 hours as needed for Wheezing 1 Inhaler 5    multivitamin (THERAGRAN) per tablet Take 1 tablet by mouth daily. Cholecalciferol (VITAMIN D) 2000 UNITS TABS Take 1,000 Units by mouth daily. No current facility-administered medications on file prior to visit. Physical Exam   Constitutional:  Oriented to person, place, and time. Appears well-developed and well-nourished. No acute distress. HENT: No sinus tenderness or lymphadenopathy  Head: Normocephalic and atraumatic. Eyes: Eyes exhibits no discharge. No scleral icterus present. Neck: No tracheal deviation present. No thyromegaly present. Cardiovascular: Normal rate, regular rhythm, normal heart sounds and intact distal pulses. Exam reveals no gallop nor friction rub. No murmur heard. Pulmonary: Effort normal and breath sounds normal. No respiratory distress. No wheezes or rales. Abdomen: No signs of rigidity rebound or organomegaly  Musculoskeletal:  No tenderness to palpation  Neurological:Alert and oriented to person, place, and time. Skin: No diaphoresis. Psychiatric: Normal mood and affect. Behavior is Normal.     ASSESSMENT AND PLAN:        Return in about 1 month (around 3/24/2023). Electronically signed by Natan Rubalcava DO on 2/24/2023 at 4:19 PM    Natan Rubalcava DO    Assessment  Diagnoses and all orders for this visit:  Leg swelling  Essential hypertension  Other orders  -     Compression Stockings MISC; by Does not apply route 15-25 mm hg  -     amLODIPine (NORVASC) 5 MG tablet;  Take 1 tablet by mouth daily

## 2023-03-08 ENCOUNTER — HOSPITAL ENCOUNTER (OUTPATIENT)
Age: 63
Discharge: HOME | End: 2023-03-10

## 2023-03-08 PROCEDURE — 82365 CALCULUS SPECTROSCOPY: CPT

## 2023-03-12 LAB
CALCULI COMPOSITION: NORMAL
MASS: 16 MG
STONE DESCRIPTION: NORMAL

## 2023-03-24 ENCOUNTER — OFFICE VISIT (OUTPATIENT)
Dept: PRIMARY CARE CLINIC | Age: 63
End: 2023-03-24
Payer: MEDICAID

## 2023-03-24 VITALS
WEIGHT: 206 LBS | HEART RATE: 78 BPM | TEMPERATURE: 97.2 F | SYSTOLIC BLOOD PRESSURE: 122 MMHG | BODY MASS INDEX: 36.49 KG/M2 | OXYGEN SATURATION: 97 % | DIASTOLIC BLOOD PRESSURE: 68 MMHG

## 2023-03-24 DIAGNOSIS — M79.89 LEG SWELLING: Primary | ICD-10-CM

## 2023-03-24 DIAGNOSIS — I10 ESSENTIAL HYPERTENSION: ICD-10-CM

## 2023-03-24 DIAGNOSIS — E79.0 ELEVATED BLOOD URIC ACID LEVEL: ICD-10-CM

## 2023-03-24 DIAGNOSIS — E78.2 MIXED HYPERLIPIDEMIA: ICD-10-CM

## 2023-03-24 PROCEDURE — G8427 DOCREV CUR MEDS BY ELIG CLIN: HCPCS | Performed by: INTERNAL MEDICINE

## 2023-03-24 PROCEDURE — 3078F DIAST BP <80 MM HG: CPT | Performed by: INTERNAL MEDICINE

## 2023-03-24 PROCEDURE — 4004F PT TOBACCO SCREEN RCVD TLK: CPT | Performed by: INTERNAL MEDICINE

## 2023-03-24 PROCEDURE — 99214 OFFICE O/P EST MOD 30 MIN: CPT | Performed by: INTERNAL MEDICINE

## 2023-03-24 PROCEDURE — G8417 CALC BMI ABV UP PARAM F/U: HCPCS | Performed by: INTERNAL MEDICINE

## 2023-03-24 PROCEDURE — 3017F COLORECTAL CA SCREEN DOC REV: CPT | Performed by: INTERNAL MEDICINE

## 2023-03-24 PROCEDURE — 3074F SYST BP LT 130 MM HG: CPT | Performed by: INTERNAL MEDICINE

## 2023-03-24 PROCEDURE — G8484 FLU IMMUNIZE NO ADMIN: HCPCS | Performed by: INTERNAL MEDICINE

## 2023-03-24 RX ORDER — PRAVASTATIN SODIUM 40 MG
TABLET ORAL
Qty: 90 TABLET | Refills: 1 | Status: SHIPPED | OUTPATIENT
Start: 2023-03-24

## 2023-03-24 RX ORDER — ALLOPURINOL 300 MG/1
TABLET ORAL
Qty: 90 TABLET | Refills: 1 | Status: SHIPPED | OUTPATIENT
Start: 2023-03-24

## 2023-03-24 RX ORDER — FENOFIBRATE 160 MG/1
TABLET ORAL
Qty: 90 TABLET | Refills: 2 | Status: SHIPPED | OUTPATIENT
Start: 2023-03-24

## 2023-03-24 SDOH — ECONOMIC STABILITY: FOOD INSECURITY: WITHIN THE PAST 12 MONTHS, THE FOOD YOU BOUGHT JUST DIDN'T LAST AND YOU DIDN'T HAVE MONEY TO GET MORE.: NEVER TRUE

## 2023-03-24 SDOH — ECONOMIC STABILITY: INCOME INSECURITY: HOW HARD IS IT FOR YOU TO PAY FOR THE VERY BASICS LIKE FOOD, HOUSING, MEDICAL CARE, AND HEATING?: NOT HARD AT ALL

## 2023-03-24 SDOH — ECONOMIC STABILITY: HOUSING INSECURITY
IN THE LAST 12 MONTHS, WAS THERE A TIME WHEN YOU DID NOT HAVE A STEADY PLACE TO SLEEP OR SLEPT IN A SHELTER (INCLUDING NOW)?: NO

## 2023-03-24 SDOH — ECONOMIC STABILITY: FOOD INSECURITY: WITHIN THE PAST 12 MONTHS, YOU WORRIED THAT YOUR FOOD WOULD RUN OUT BEFORE YOU GOT MONEY TO BUY MORE.: NEVER TRUE

## 2023-03-24 NOTE — PROGRESS NOTES
comorbidity) 12/27/2016    Chronic depression 06/27/2016    CAD (coronary artery disease) 06/27/2016    Angina pectoris ([facility] [address]     Arrhythmia     Hypothyroidism     Hyperlipidemia      Allergies   Allergen Reactions    Biaxin [Clarithromycin] Other (See Comments)     psychosis    Levofloxacin Other (See Comments)     Patient unsure reaction     Current Outpatient Medications on File Prior to Visit   Medication Sig Dispense Refill    Compression Stockings MISC by Does not apply route 15-25 mm hg 1 each 0    amLODIPine (NORVASC) 5 MG tablet Take 1 tablet by mouth daily 30 tablet 3    ondansetron (ZOFRAN-ODT) 4 MG disintegrating tablet Take 1 tablet by mouth 3 times daily as needed for Nausea or Vomiting 30 tablet 0    pravastatin (PRAVACHOL) 40 MG tablet 1 po qd q hs 90 tablet 1    metoprolol tartrate (LOPRESSOR) 50 MG tablet Take 1 tablet by mouth 2 times daily 180 tablet 1    levothyroxine (EUTHYROX) 75 MCG tablet Take 1 tablet by mouth once daily 90 tablet 2    fenofibrate (TRIGLIDE) 160 MG tablet 1 po qd 90 tablet 2    DULoxetine (CYMBALTA) 60 MG extended release capsule Take 1 tablet daily 90 capsule 2    allopurinol (ZYLOPRIM) 300 MG tablet 1 po qd 90 tablet 1    nystatin (MYCOSTATIN) 969228 UNIT/GM powder Apply 3 times daily. 30 g 1    clobetasol (TEMOVATE) 0.05 % cream Apply topically 2 times daily as needed (psoriasis) For psoriasis 30 g 3    Ascorbic Acid (VITAMIN C WITH RICARDO HIPS) 500 MG tablet       Glucosamine-Chondroitin (GLUCOSAMINE CHONDR COMPLEX PO) Take by mouth 2 times daily      clotrimazole-betamethasone (LOTRISONE) 1-0.05 % cream Apply topically 2 times daily. (Patient taking differently: as needed Apply topically 2 times daily.) 30 g 1    clobetasol (TEMOVATE) 0.05 % ointment Apply 1 each topically 2 times daily Apply topically 2 times daily.  (Patient taking differently: Apply 1 each topically as needed Apply topically 2 times daily.) 45 g 5    albuterol sulfate HFA (VENTOLIN HFA) 108 (90 Base)

## 2023-04-03 DIAGNOSIS — I10 ESSENTIAL HYPERTENSION: ICD-10-CM

## 2023-04-03 DIAGNOSIS — I49.9 CARDIAC ARRHYTHMIA, UNSPECIFIED CARDIAC ARRHYTHMIA TYPE: ICD-10-CM

## 2023-04-03 RX ORDER — METOPROLOL TARTRATE 50 MG/1
TABLET, FILM COATED ORAL
Qty: 180 TABLET | Refills: 0 | Status: SHIPPED | OUTPATIENT
Start: 2023-04-03

## 2023-06-23 DIAGNOSIS — I10 ESSENTIAL HYPERTENSION: ICD-10-CM

## 2023-06-23 DIAGNOSIS — E03.9 HYPOTHYROIDISM, UNSPECIFIED TYPE: ICD-10-CM

## 2023-06-23 DIAGNOSIS — I49.9 CARDIAC ARRHYTHMIA, UNSPECIFIED CARDIAC ARRHYTHMIA TYPE: ICD-10-CM

## 2023-06-23 DIAGNOSIS — F32.A CHRONIC DEPRESSION: ICD-10-CM

## 2023-06-23 RX ORDER — METOPROLOL TARTRATE 50 MG/1
50 TABLET, FILM COATED ORAL 2 TIMES DAILY
Qty: 180 TABLET | Refills: 0 | Status: SHIPPED | OUTPATIENT
Start: 2023-06-23

## 2023-06-23 RX ORDER — AMLODIPINE BESYLATE 5 MG/1
5 TABLET ORAL DAILY
Qty: 30 TABLET | Refills: 3 | Status: SHIPPED | OUTPATIENT
Start: 2023-06-23

## 2023-06-23 RX ORDER — DULOXETIN HYDROCHLORIDE 60 MG/1
CAPSULE, DELAYED RELEASE ORAL
Qty: 90 CAPSULE | Refills: 2 | Status: SHIPPED | OUTPATIENT
Start: 2023-06-23

## 2023-06-23 RX ORDER — LEVOTHYROXINE SODIUM 0.07 MG/1
TABLET ORAL
Qty: 90 TABLET | Refills: 2 | Status: SHIPPED | OUTPATIENT
Start: 2023-06-23

## 2023-07-25 ENCOUNTER — OFFICE VISIT (OUTPATIENT)
Dept: PRIMARY CARE CLINIC | Age: 63
End: 2023-07-25
Payer: MEDICAID

## 2023-07-25 VITALS
TEMPERATURE: 97.2 F | OXYGEN SATURATION: 97 % | SYSTOLIC BLOOD PRESSURE: 120 MMHG | HEART RATE: 90 BPM | BODY MASS INDEX: 36.67 KG/M2 | DIASTOLIC BLOOD PRESSURE: 80 MMHG | WEIGHT: 207 LBS

## 2023-07-25 DIAGNOSIS — E66.9 OBESITY (BMI 35.0-39.9 WITHOUT COMORBIDITY): ICD-10-CM

## 2023-07-25 DIAGNOSIS — Z12.31 SCREENING MAMMOGRAM FOR BREAST CANCER: ICD-10-CM

## 2023-07-25 DIAGNOSIS — E03.9 ACQUIRED HYPOTHYROIDISM: ICD-10-CM

## 2023-07-25 DIAGNOSIS — E78.00 HYPERCHOLESTEROLEMIA: Primary | ICD-10-CM

## 2023-07-25 DIAGNOSIS — I10 ESSENTIAL HYPERTENSION: ICD-10-CM

## 2023-07-25 DIAGNOSIS — E78.00 HYPERCHOLESTEROLEMIA: ICD-10-CM

## 2023-07-25 PROBLEM — N20.0 KIDNEY STONE: Status: RESOLVED | Noted: 2023-02-16 | Resolved: 2023-07-25

## 2023-07-25 LAB
ALBUMIN SERPL-MCNC: 5.9 G/DL (ref 3.5–5.2)
ALP BLD-CCNC: 68 U/L (ref 35–104)
ALT SERPL-CCNC: 19 U/L (ref 0–32)
ANION GAP SERPL CALCULATED.3IONS-SCNC: 10 MMOL/L (ref 7–16)
AST SERPL-CCNC: 24 U/L (ref 0–31)
BILIRUB SERPL-MCNC: 0.3 MG/DL (ref 0–1.2)
BUN BLDV-MCNC: 21 MG/DL (ref 6–23)
CALCIUM SERPL-MCNC: 9.8 MG/DL (ref 8.6–10.2)
CHLORIDE BLD-SCNC: 103 MMOL/L (ref 98–107)
CHOLESTEROL: 133 MG/DL
CO2: 28 MMOL/L (ref 22–29)
CREAT SERPL-MCNC: 0.8 MG/DL (ref 0.5–1)
GFR SERPL CREATININE-BSD FRML MDRD: >60 ML/MIN/1.73M2
GLUCOSE BLD-MCNC: 88 MG/DL (ref 74–99)
HCT VFR BLD CALC: 42 % (ref 34–48)
HDLC SERPL-MCNC: 40 MG/DL
HEMOGLOBIN: 13.2 G/DL (ref 11.5–15.5)
LDL CHOLESTEROL: 68 MG/DL
MCH RBC QN AUTO: 29.1 PG (ref 26–35)
MCHC RBC AUTO-ENTMCNC: 31.4 G/DL (ref 32–34.5)
MCV RBC AUTO: 92.5 FL (ref 80–99.9)
PDW BLD-RTO: 13.3 % (ref 11.5–15)
PLATELET # BLD: 169 K/UL (ref 130–450)
PMV BLD AUTO: 12.5 FL (ref 7–12)
POTASSIUM SERPL-SCNC: 4.4 MMOL/L (ref 3.5–5)
RBC # BLD: 4.54 M/UL (ref 3.5–5.5)
SODIUM BLD-SCNC: 141 MMOL/L (ref 132–146)
TOTAL PROTEIN: 7.4 G/DL (ref 6.4–8.3)
TRIGL SERPL-MCNC: 126 MG/DL
TSH SERPL DL<=0.05 MIU/L-ACNC: 2.52 UIU/ML (ref 0.27–4.2)
VLDLC SERPL CALC-MCNC: 25 MG/DL
WBC # BLD: 8.2 K/UL (ref 4.5–11.5)

## 2023-07-25 PROCEDURE — 3074F SYST BP LT 130 MM HG: CPT | Performed by: INTERNAL MEDICINE

## 2023-07-25 PROCEDURE — 3017F COLORECTAL CA SCREEN DOC REV: CPT | Performed by: INTERNAL MEDICINE

## 2023-07-25 PROCEDURE — 3079F DIAST BP 80-89 MM HG: CPT | Performed by: INTERNAL MEDICINE

## 2023-07-25 PROCEDURE — G8427 DOCREV CUR MEDS BY ELIG CLIN: HCPCS | Performed by: INTERNAL MEDICINE

## 2023-07-25 PROCEDURE — G8417 CALC BMI ABV UP PARAM F/U: HCPCS | Performed by: INTERNAL MEDICINE

## 2023-07-25 PROCEDURE — 4004F PT TOBACCO SCREEN RCVD TLK: CPT | Performed by: INTERNAL MEDICINE

## 2023-07-25 PROCEDURE — 99214 OFFICE O/P EST MOD 30 MIN: CPT | Performed by: INTERNAL MEDICINE

## 2023-07-25 RX ORDER — AMLODIPINE BESYLATE 5 MG/1
5 TABLET ORAL DAILY
Qty: 90 TABLET | Refills: 1 | Status: SHIPPED | OUTPATIENT
Start: 2023-07-25

## 2023-07-26 ENCOUNTER — HOSPITAL ENCOUNTER (OUTPATIENT)
Dept: MAMMOGRAPHY | Age: 63
Discharge: HOME | End: 2023-07-28
Payer: MEDICAID

## 2023-07-26 VITALS — HEIGHT: 64 IN | BODY MASS INDEX: 35.34 KG/M2 | WEIGHT: 207 LBS

## 2023-07-26 DIAGNOSIS — Z12.31 SCREENING MAMMOGRAM FOR BREAST CANCER: ICD-10-CM

## 2023-07-26 PROCEDURE — 77063 BREAST TOMOSYNTHESIS BI: CPT

## 2023-08-03 DIAGNOSIS — R92.8 ABNORMAL MAMMOGRAM: Primary | ICD-10-CM

## 2023-08-07 ENCOUNTER — TELEPHONE (OUTPATIENT)
Dept: GENERAL RADIOLOGY | Age: 63
End: 2023-08-07

## 2023-08-07 NOTE — TELEPHONE ENCOUNTER
Call to patient in reference to her mammogram performed on the [facility] on July 26, 2023. Instructed patient that the radiologist has recommended some additional breast imaging in order to make a final determination/result. Informed her that a Rx has been obtained by the ordering physician. Next, a  from [facility] will contact her to schedule the additional imaging study/studies. Verbalizes understanding and is agreeable to proceed.

## 2023-08-15 ENCOUNTER — HOSPITAL ENCOUNTER (OUTPATIENT)
Dept: GENERAL RADIOLOGY | Age: 63
Discharge: HOME | End: 2023-08-17
Payer: MEDICAID

## 2023-08-15 VITALS — HEIGHT: 63 IN | WEIGHT: 207 LBS | BODY MASS INDEX: 36.68 KG/M2

## 2023-08-15 DIAGNOSIS — R92.8 ABNORMAL MAMMOGRAM: Primary | ICD-10-CM

## 2023-08-15 DIAGNOSIS — R92.8 ABNORMAL MAMMOGRAM: ICD-10-CM

## 2023-08-15 PROCEDURE — 77065 DX MAMMO INCL CAD UNI: CPT

## 2023-08-15 PROCEDURE — G0279 TOMOSYNTHESIS, MAMMO: HCPCS

## 2023-08-21 ENCOUNTER — HOSPITAL ENCOUNTER (OUTPATIENT)
Dept: GENERAL RADIOLOGY | Age: 63
Discharge: HOME | End: 2023-08-23
Payer: MEDICAID

## 2023-08-21 DIAGNOSIS — R92.8 ABNORMAL MAMMOGRAM: ICD-10-CM

## 2023-08-21 PROCEDURE — 2709999900 MAM STEREO BREAST BX W LOC DEVICE 1ST LESION RIGHT

## 2023-08-21 PROCEDURE — 88305 TISSUE EXAM BY PATHOLOGIST: CPT

## 2023-08-21 PROCEDURE — 88342 IMHCHEM/IMCYTCHM 1ST ANTB: CPT

## 2023-08-21 PROCEDURE — 19081 BX BREAST 1ST LESION STRTCTC: CPT

## 2023-08-21 NOTE — PROGRESS NOTES
Met with patient and her  prior to her breast biopsy. Instructed on stereotactic/tomosynthesis guided  breast biopsy procedure. Denies use of blood thinners or aspirin products within the past 5 days. Instructed that results will be available in approximately 3-5 business days. She confirms that she has an active [employer] account and is agreeable to discussion of breast biopsy results by phone. Instructed that her physician will also be notified of results. Provided with folder containing my contact information and post biopsy discharge instructions. Instructed to call me if she has any questions or concerns about her biopsy. Verbalizes understanding.   Electronically signed by Abebe Leyva RN, BSN on 8/21/2023 at 11:21 AM

## 2023-08-24 ENCOUNTER — TELEPHONE (OUTPATIENT)
Dept: GENERAL RADIOLOGY | Age: 63
End: 2023-08-24

## 2023-08-24 LAB — SURGICAL PATHOLOGY REPORT: NORMAL

## 2023-08-24 NOTE — TELEPHONE ENCOUNTER
Patient notified of benign breast biopsy findings and performing radiologist's surgical consultation recommendation for her right breast radial scar at the 10 o'clock position. Patient verbalizes understanding. Breast biopsy results routed to Dr. Rey Razo.

## 2023-09-07 ENCOUNTER — TELEPHONE (OUTPATIENT)
Dept: PRIMARY CARE CLINIC | Age: 63
End: 2023-09-07

## 2023-09-07 DIAGNOSIS — R92.8 ABNORMAL MAMMOGRAM: Primary | ICD-10-CM

## 2023-09-07 NOTE — TELEPHONE ENCOUNTER
Pt called and needs to be seen by a surgeon in regards to her mammogram results. Please advise, will call pt back with information.

## 2023-09-08 ENCOUNTER — TELEPHONE (OUTPATIENT)
Dept: BREAST CENTER | Age: 63
End: 2023-09-08

## 2023-09-08 NOTE — TELEPHONE ENCOUNTER
Left message with call back number to schedule a consultation in the breast clinic. Patient can be scheduled as a NEW PATIENT: surgical consult recommended after recent biopsy at [facility] (benign)-right breast radial scar 10 o'clock position Referring: Dr. Thanh Pena.

## 2023-09-18 ENCOUNTER — TELEPHONE (OUTPATIENT)
Dept: BREAST CENTER | Age: 63
End: 2023-09-18

## 2023-09-18 DIAGNOSIS — E78.2 MIXED HYPERLIPIDEMIA: ICD-10-CM

## 2023-09-18 DIAGNOSIS — I49.9 CARDIAC ARRHYTHMIA, UNSPECIFIED CARDIAC ARRHYTHMIA TYPE: ICD-10-CM

## 2023-09-18 DIAGNOSIS — I10 ESSENTIAL HYPERTENSION: ICD-10-CM

## 2023-09-18 DIAGNOSIS — E79.0 ELEVATED BLOOD URIC ACID LEVEL: ICD-10-CM

## 2023-09-18 NOTE — TELEPHONE ENCOUNTER
Left second message in attempt to schedule patient for a surgical consultation as recommended on her recent breast imaging at [facility]. Call back number provided.

## 2023-09-19 RX ORDER — METOPROLOL TARTRATE 50 MG/1
50 TABLET, FILM COATED ORAL 2 TIMES DAILY
Qty: 180 TABLET | Refills: 0 | Status: SHIPPED | OUTPATIENT
Start: 2023-09-19

## 2023-09-19 RX ORDER — PRAVASTATIN SODIUM 40 MG
TABLET ORAL
Qty: 90 TABLET | Refills: 1 | Status: SHIPPED | OUTPATIENT
Start: 2023-09-19

## 2023-09-19 RX ORDER — FENOFIBRATE 160 MG/1
TABLET ORAL
Qty: 90 TABLET | Refills: 2 | Status: SHIPPED | OUTPATIENT
Start: 2023-09-19

## 2023-09-19 RX ORDER — ALLOPURINOL 300 MG/1
TABLET ORAL
Qty: 90 TABLET | Refills: 1 | Status: SHIPPED | OUTPATIENT
Start: 2023-09-19

## 2023-09-19 RX ORDER — AMLODIPINE BESYLATE 5 MG/1
5 TABLET ORAL DAILY
Qty: 90 TABLET | Refills: 1 | Status: SHIPPED | OUTPATIENT
Start: 2023-09-19

## 2023-09-25 ENCOUNTER — TELEPHONE (OUTPATIENT)
Dept: BREAST CENTER | Age: 63
End: 2023-09-25

## 2023-09-25 NOTE — TELEPHONE ENCOUNTER
Patient was referred by Rosanna Aguiar for surgical consult. Patient was scheduled on 10/20/2023 in [facility] office @ 8:30am with Dr. Verna Yip. Patient was instructed to bring a photo ID, insurance card (if applicable), and list of any current medications. Patient verbalized understanding of appointment instructions.         Electronically signed by Mary Garcia RN on 9/25/23 at 2:22 PM EDT

## 2023-09-25 NOTE — TELEPHONE ENCOUNTER
Called and left another message with call back to schedule this new referral a surgical consult as recommended by her breast imaging.     Electronically signed by Paty Tucker RN on 9/25/23 at 2:11 PM EDT

## 2023-11-13 ENCOUNTER — OFFICE VISIT (OUTPATIENT)
Dept: BREAST CENTER | Age: 63
End: 2023-11-13
Payer: MEDICAID

## 2023-11-13 VITALS
WEIGHT: 209 LBS | DIASTOLIC BLOOD PRESSURE: 78 MMHG | BODY MASS INDEX: 37.03 KG/M2 | SYSTOLIC BLOOD PRESSURE: 128 MMHG | HEIGHT: 63 IN | OXYGEN SATURATION: 100 % | TEMPERATURE: 99.1 F | RESPIRATION RATE: 16 BRPM | HEART RATE: 65 BPM

## 2023-11-13 DIAGNOSIS — R92.8 ABNORMAL MAMMOGRAM OF RIGHT BREAST: Primary | ICD-10-CM

## 2023-11-13 PROCEDURE — 3074F SYST BP LT 130 MM HG: CPT | Performed by: SURGERY

## 2023-11-13 PROCEDURE — 99203 OFFICE O/P NEW LOW 30 MIN: CPT | Performed by: SURGERY

## 2023-11-13 PROCEDURE — G8484 FLU IMMUNIZE NO ADMIN: HCPCS | Performed by: SURGERY

## 2023-11-13 PROCEDURE — 3017F COLORECTAL CA SCREEN DOC REV: CPT | Performed by: SURGERY

## 2023-11-13 PROCEDURE — 4004F PT TOBACCO SCREEN RCVD TLK: CPT | Performed by: SURGERY

## 2023-11-13 PROCEDURE — G8427 DOCREV CUR MEDS BY ELIG CLIN: HCPCS | Performed by: SURGERY

## 2023-11-13 PROCEDURE — 3078F DIAST BP <80 MM HG: CPT | Performed by: SURGERY

## 2023-11-13 PROCEDURE — G8417 CALC BMI ABV UP PARAM F/U: HCPCS | Performed by: SURGERY

## 2024-01-15 ENCOUNTER — HOSPITAL ENCOUNTER (OUTPATIENT)
Dept: GENERAL RADIOLOGY | Age: 64
Discharge: HOME | End: 2024-01-17
Payer: MEDICARE

## 2024-01-15 VITALS — WEIGHT: 207 LBS | HEIGHT: 63 IN | BODY MASS INDEX: 36.68 KG/M2

## 2024-01-15 DIAGNOSIS — R92.8 ABNORMAL MAMMOGRAM OF RIGHT BREAST: ICD-10-CM

## 2024-01-15 PROCEDURE — G0279 TOMOSYNTHESIS, MAMMO: HCPCS

## 2024-01-25 ENCOUNTER — OFFICE VISIT (OUTPATIENT)
Dept: PRIMARY CARE CLINIC | Age: 64
End: 2024-01-25
Payer: MEDICARE

## 2024-01-25 VITALS
HEART RATE: 78 BPM | TEMPERATURE: 97.7 F | SYSTOLIC BLOOD PRESSURE: 128 MMHG | WEIGHT: 206 LBS | BODY MASS INDEX: 36.49 KG/M2 | OXYGEN SATURATION: 93 % | DIASTOLIC BLOOD PRESSURE: 84 MMHG

## 2024-01-25 DIAGNOSIS — F32.A CHRONIC DEPRESSION: ICD-10-CM

## 2024-01-25 DIAGNOSIS — E03.8 OTHER SPECIFIED HYPOTHYROIDISM: ICD-10-CM

## 2024-01-25 DIAGNOSIS — E78.2 MIXED HYPERLIPIDEMIA: ICD-10-CM

## 2024-01-25 DIAGNOSIS — J44.9 STAGE 1 MILD COPD BY GOLD CLASSIFICATION (HCC): ICD-10-CM

## 2024-01-25 DIAGNOSIS — E79.0 ELEVATED BLOOD URIC ACID LEVEL: ICD-10-CM

## 2024-01-25 DIAGNOSIS — E78.00 HYPERCHOLESTEROLEMIA: ICD-10-CM

## 2024-01-25 DIAGNOSIS — I10 ESSENTIAL HYPERTENSION: ICD-10-CM

## 2024-01-25 DIAGNOSIS — I49.9 CARDIAC ARRHYTHMIA, UNSPECIFIED CARDIAC ARRHYTHMIA TYPE: ICD-10-CM

## 2024-01-25 DIAGNOSIS — G47.30 SLEEP APNEA, UNSPECIFIED TYPE: Primary | ICD-10-CM

## 2024-01-25 DIAGNOSIS — E03.9 HYPOTHYROIDISM, UNSPECIFIED TYPE: ICD-10-CM

## 2024-01-25 LAB
ALBUMIN SERPL-MCNC: 4.2 G/DL (ref 3.5–5.2)
ALP BLD-CCNC: 68 U/L (ref 35–104)
ALT SERPL-CCNC: 20 U/L (ref 0–32)
ANION GAP SERPL CALCULATED.3IONS-SCNC: 15 MMOL/L (ref 7–16)
AST SERPL-CCNC: 25 U/L (ref 0–31)
BILIRUB SERPL-MCNC: 0.4 MG/DL (ref 0–1.2)
BUN BLDV-MCNC: 12 MG/DL (ref 6–23)
CALCIUM SERPL-MCNC: 9.6 MG/DL (ref 8.6–10.2)
CHLORIDE BLD-SCNC: 103 MMOL/L (ref 98–107)
CHOLESTEROL: 126 MG/DL
CO2: 24 MMOL/L (ref 22–29)
CREAT SERPL-MCNC: 0.7 MG/DL (ref 0.5–1)
GFR SERPL CREATININE-BSD FRML MDRD: >60 ML/MIN/1.73M2
GLUCOSE BLD-MCNC: 92 MG/DL (ref 74–99)
HCT VFR BLD CALC: 41.1 % (ref 34–48)
HDLC SERPL-MCNC: 39 MG/DL
HEMOGLOBIN: 13.4 G/DL (ref 11.5–15.5)
LDL CHOLESTEROL: 63 MG/DL
MCH RBC QN AUTO: 30 PG (ref 26–35)
MCHC RBC AUTO-ENTMCNC: 32.6 G/DL (ref 32–34.5)
MCV RBC AUTO: 91.9 FL (ref 80–99.9)
PDW BLD-RTO: 12.8 % (ref 11.5–15)
PLATELET # BLD: 202 K/UL (ref 130–450)
PMV BLD AUTO: 12.1 FL (ref 7–12)
POTASSIUM SERPL-SCNC: 3.9 MMOL/L (ref 3.5–5)
RBC # BLD: 4.47 M/UL (ref 3.5–5.5)
SODIUM BLD-SCNC: 142 MMOL/L (ref 132–146)
TOTAL PROTEIN: 7.3 G/DL (ref 6.4–8.3)
TRIGL SERPL-MCNC: 119 MG/DL
TSH SERPL DL<=0.05 MIU/L-ACNC: 1.94 UIU/ML (ref 0.27–4.2)
VLDLC SERPL CALC-MCNC: 24 MG/DL
WBC # BLD: 7.8 K/UL (ref 4.5–11.5)

## 2024-01-25 PROCEDURE — 1123F ACP DISCUSS/DSCN MKR DOCD: CPT | Performed by: INTERNAL MEDICINE

## 2024-01-25 PROCEDURE — G8427 DOCREV CUR MEDS BY ELIG CLIN: HCPCS | Performed by: INTERNAL MEDICINE

## 2024-01-25 PROCEDURE — 3079F DIAST BP 80-89 MM HG: CPT | Performed by: INTERNAL MEDICINE

## 2024-01-25 PROCEDURE — 1090F PRES/ABSN URINE INCON ASSESS: CPT | Performed by: INTERNAL MEDICINE

## 2024-01-25 PROCEDURE — 99214 OFFICE O/P EST MOD 30 MIN: CPT | Performed by: INTERNAL MEDICINE

## 2024-01-25 PROCEDURE — 4004F PT TOBACCO SCREEN RCVD TLK: CPT | Performed by: INTERNAL MEDICINE

## 2024-01-25 PROCEDURE — 3074F SYST BP LT 130 MM HG: CPT | Performed by: INTERNAL MEDICINE

## 2024-01-25 PROCEDURE — G8484 FLU IMMUNIZE NO ADMIN: HCPCS | Performed by: INTERNAL MEDICINE

## 2024-01-25 PROCEDURE — G8417 CALC BMI ABV UP PARAM F/U: HCPCS | Performed by: INTERNAL MEDICINE

## 2024-01-25 PROCEDURE — 36415 COLL VENOUS BLD VENIPUNCTURE: CPT | Performed by: INTERNAL MEDICINE

## 2024-01-25 PROCEDURE — 3023F SPIROM DOC REV: CPT | Performed by: INTERNAL MEDICINE

## 2024-01-25 PROCEDURE — G8400 PT W/DXA NO RESULTS DOC: HCPCS | Performed by: INTERNAL MEDICINE

## 2024-01-25 PROCEDURE — 3017F COLORECTAL CA SCREEN DOC REV: CPT | Performed by: INTERNAL MEDICINE

## 2024-01-25 RX ORDER — DULOXETIN HYDROCHLORIDE 60 MG/1
CAPSULE, DELAYED RELEASE ORAL
Qty: 90 CAPSULE | Refills: 1 | Status: SHIPPED | OUTPATIENT
Start: 2024-01-25

## 2024-01-25 RX ORDER — METOPROLOL TARTRATE 50 MG/1
50 TABLET, FILM COATED ORAL 2 TIMES DAILY
Qty: 180 TABLET | Refills: 1 | Status: SHIPPED | OUTPATIENT
Start: 2024-01-25

## 2024-01-25 RX ORDER — AMLODIPINE BESYLATE 5 MG/1
5 TABLET ORAL DAILY
Qty: 90 TABLET | Refills: 1 | Status: SHIPPED | OUTPATIENT
Start: 2024-01-25

## 2024-01-25 RX ORDER — ALBUTEROL SULFATE 90 UG/1
2 AEROSOL, METERED RESPIRATORY (INHALATION) EVERY 6 HOURS PRN
Qty: 1 EACH | Refills: 5 | Status: SHIPPED | OUTPATIENT
Start: 2024-01-25

## 2024-01-25 RX ORDER — FENOFIBRATE 160 MG/1
TABLET ORAL
Qty: 90 TABLET | Refills: 1 | Status: SHIPPED | OUTPATIENT
Start: 2024-01-25

## 2024-01-25 RX ORDER — ALLOPURINOL 300 MG/1
TABLET ORAL
Qty: 90 TABLET | Refills: 1 | Status: SHIPPED | OUTPATIENT
Start: 2024-01-25

## 2024-01-25 RX ORDER — PRAVASTATIN SODIUM 40 MG
TABLET ORAL
Qty: 90 TABLET | Refills: 1 | Status: SHIPPED | OUTPATIENT
Start: 2024-01-25

## 2024-01-25 RX ORDER — LEVOTHYROXINE SODIUM 0.07 MG/1
TABLET ORAL
Qty: 90 TABLET | Refills: 1 | Status: SHIPPED | OUTPATIENT
Start: 2024-01-25

## 2024-01-25 ASSESSMENT — PATIENT HEALTH QUESTIONNAIRE - PHQ9
3. TROUBLE FALLING OR STAYING ASLEEP: 0
7. TROUBLE CONCENTRATING ON THINGS, SUCH AS READING THE NEWSPAPER OR WATCHING TELEVISION: 0
SUM OF ALL RESPONSES TO PHQ QUESTIONS 1-9: 0
6. FEELING BAD ABOUT YOURSELF - OR THAT YOU ARE A FAILURE OR HAVE LET YOURSELF OR YOUR FAMILY DOWN: 0
SUM OF ALL RESPONSES TO PHQ QUESTIONS 1-9: 0
SUM OF ALL RESPONSES TO PHQ QUESTIONS 1-9: 0
2. FEELING DOWN, DEPRESSED OR HOPELESS: 0
10. IF YOU CHECKED OFF ANY PROBLEMS, HOW DIFFICULT HAVE THESE PROBLEMS MADE IT FOR YOU TO DO YOUR WORK, TAKE CARE OF THINGS AT HOME, OR GET ALONG WITH OTHER PEOPLE: 0
5. POOR APPETITE OR OVEREATING: 0
1. LITTLE INTEREST OR PLEASURE IN DOING THINGS: 0
9. THOUGHTS THAT YOU WOULD BE BETTER OFF DEAD, OR OF HURTING YOURSELF: 0
SUM OF ALL RESPONSES TO PHQ QUESTIONS 1-9: 0
4. FEELING TIRED OR HAVING LITTLE ENERGY: 0
SUM OF ALL RESPONSES TO PHQ9 QUESTIONS 1 & 2: 0
8. MOVING OR SPEAKING SO SLOWLY THAT OTHER PEOPLE COULD HAVE NOTICED. OR THE OPPOSITE, BEING SO FIGETY OR RESTLESS THAT YOU HAVE BEEN MOVING AROUND A LOT MORE THAN USUAL: 0

## 2024-01-25 NOTE — PROGRESS NOTES
1/25/24    Roma Dias, a female of 63 y.o. presents today for 6 Month Follow-Up            Diagnoses and all orders for this visit:  Sleep apnea, unspecified type  -     AFL (Epic) - Vinnie Gandhi DO, Pulmonology, [place]  Mixed hyperlipidemia  -     pravastatin (PRAVACHOL) 40 MG tablet; 1 po qd q hs  -     fenofibrate (TRIGLIDE) 160 MG tablet; 1 po qd  Essential hypertension  -     metoprolol tartrate (LOPRESSOR) 50 MG tablet; Take 1 tablet by mouth 2 times daily  -     amLODIPine (NORVASC) 5 MG tablet; Take 1 tablet by mouth daily  Cardiac arrhythmia, unspecified cardiac arrhythmia type  -     metoprolol tartrate (LOPRESSOR) 50 MG tablet; Take 1 tablet by mouth 2 times daily  Hypothyroidism, unspecified type  -     levothyroxine (EUTHYROX) 75 MCG tablet; Take 1 tablet by mouth once daily  Chronic depression  -     DULoxetine (CYMBALTA) 60 MG extended release capsule; Take 1 tablet daily  Elevated blood uric acid level  -     allopurinol (ZYLOPRIM) 300 MG tablet; 1 po qd  Stage 1 mild COPD by GOLD classification ([facility])  -     albuterol sulfate HFA (VENTOLIN HFA) 108 (90 Base) MCG/ACT inhaler; Inhale 2 puffs into the lungs every 6 hours as needed for Wheezing  Other specified hypothyroidism  -     Comprehensive Metabolic Panel  -     CBC  -     TSH  Hypercholesterolemia  -     Lipid Panel            Electronically signed by Lg Figueroa DO on 1/25/2024 at 9:17 AM                          /84   Pulse 78   Temp 97.7 °F (36.5 °C)   Wt 93.4 kg (206 lb)   SpO2 93%   BMI 36.49 kg/m²     Review of Systems  Constitutional:Negative for activity change, appetite change, chills, fatigue and fever.   Respiratory: Negative for choking, chest tightness, shortness of breath and wheezing.  Cardiovascular: Negative for chest pain, palpitations and leg swelling.   Gastrointestinal: Negative for abdominal distention, constipation, diarrhea, nausea and vomiting.   Musculoskeletal: Negative for

## 2024-07-15 ENCOUNTER — TELEPHONE (OUTPATIENT)
Dept: BREAST CENTER | Age: 64
End: 2024-07-15

## 2024-07-15 ENCOUNTER — HOSPITAL ENCOUNTER (OUTPATIENT)
Dept: GENERAL RADIOLOGY | Age: 64
Discharge: HOME | End: 2024-07-17
Payer: MEDICARE

## 2024-07-15 ENCOUNTER — OFFICE VISIT (OUTPATIENT)
Dept: BREAST CENTER | Age: 64
End: 2024-07-15
Payer: MEDICARE

## 2024-07-15 VITALS
OXYGEN SATURATION: 100 % | BODY MASS INDEX: 36.14 KG/M2 | HEART RATE: 84 BPM | WEIGHT: 204 LBS | HEIGHT: 63 IN | RESPIRATION RATE: 20 BRPM | SYSTOLIC BLOOD PRESSURE: 132 MMHG | DIASTOLIC BLOOD PRESSURE: 84 MMHG | TEMPERATURE: 97.8 F

## 2024-07-15 VITALS — BODY MASS INDEX: 36.5 KG/M2 | WEIGHT: 206 LBS | HEIGHT: 63 IN

## 2024-07-15 DIAGNOSIS — N64.59 ABNORMAL BREAST FINDING: Primary | ICD-10-CM

## 2024-07-15 DIAGNOSIS — R92.8 ABNORMAL MAMMOGRAM OF RIGHT BREAST: ICD-10-CM

## 2024-07-15 PROCEDURE — 3079F DIAST BP 80-89 MM HG: CPT | Performed by: SURGERY

## 2024-07-15 PROCEDURE — G8400 PT W/DXA NO RESULTS DOC: HCPCS | Performed by: SURGERY

## 2024-07-15 PROCEDURE — G8427 DOCREV CUR MEDS BY ELIG CLIN: HCPCS | Performed by: SURGERY

## 2024-07-15 PROCEDURE — 1090F PRES/ABSN URINE INCON ASSESS: CPT | Performed by: SURGERY

## 2024-07-15 PROCEDURE — G0279 TOMOSYNTHESIS, MAMMO: HCPCS

## 2024-07-15 PROCEDURE — 3075F SYST BP GE 130 - 139MM HG: CPT | Performed by: SURGERY

## 2024-07-15 PROCEDURE — 99213 OFFICE O/P EST LOW 20 MIN: CPT | Performed by: SURGERY

## 2024-07-15 PROCEDURE — 99212 OFFICE O/P EST SF 10 MIN: CPT | Performed by: SURGERY

## 2024-07-15 PROCEDURE — 3017F COLORECTAL CA SCREEN DOC REV: CPT | Performed by: SURGERY

## 2024-07-15 PROCEDURE — 1123F ACP DISCUSS/DSCN MKR DOCD: CPT | Performed by: SURGERY

## 2024-07-15 PROCEDURE — 4004F PT TOBACCO SCREEN RCVD TLK: CPT | Performed by: SURGERY

## 2024-07-15 PROCEDURE — G8417 CALC BMI ABV UP PARAM F/U: HCPCS | Performed by: SURGERY

## 2024-07-15 NOTE — TELEPHONE ENCOUNTER
Informed from imagers that extra views are needed.  I called patients number and placed message that they will be calling to schedule

## 2024-07-15 NOTE — PROGRESS NOTES
Date of visit: 7/15/2024    11/13/23  07/15/24      DIAGNOSIS:  1. (11/13/23) RIGHT (UOQ) radial scar (unexcised)    IMAGING/PROCEDURES:  1. (07/26/23) BILATERAL st-mammogram: BIRADS-0  * RIGHT focal asymmetry  2. (08/15/23) RIGHT dt-mammogram: BIRADS-4  * RIGHT (UOQ) architectural distortion  3. (08/21/23) RIGHT stereotactic biopsy  4. (01/15/24) RIGHT dt-mammogram: BIRADS-2  * no changes  5. (07/15/24) BILATERAL dt-mammogram:    Check same day    Breast History  Roma Dias was in the office for follow-up regarding her right breast on excised radial scar.    Roma was first evaluated here in November 2023.  At that time she had a radial scar on a stereotactic biopsy.  At that time we discussed the concept of upstaging as it relates to an undiagnosed malignancy.  With my full support she opted for surveillance.    The patient had a right mammogram in January 2024 and follow-up that reported no changes.    Breast Symptoms  Roma has no symptoms to report.  Specifically, she has no masses.  She reports no skin retraction or discoloration.  She notes no nipple discharge or retraction.    Breast Examination  There are no cervical, supraclavicular, or infraclavicular lymph nodes that are palpable.    Inspection of the breast bilaterally demonstrate there to be no secondary signs of malignancy.  There are no lesions of the nipple or areola on either side.  No spontaneous discharges witnessed.    Palpation of the axilla bilaterally is without adenopathy.    Palpation of the breast demonstrates no masses.    Breast Imaging  Roma underwent bilateral diagnostic mammography today prior to her visit.  I did review these images and compared them to prior.  I can appreciate no significant changes.  However, we must await the official radiology review.    Assessment/Plan  Roma Dias is in the office regarding an unexcised to radial scar of the right breast.    I informed Roma her clinical exam is benign and my

## 2024-07-16 ENCOUNTER — HOSPITAL ENCOUNTER (OUTPATIENT)
Dept: GENERAL RADIOLOGY | Age: 64
Discharge: HOME | End: 2024-07-18
Attending: SURGERY
Payer: MEDICARE

## 2024-07-16 DIAGNOSIS — R92.8 ABNORMAL MAMMOGRAM: Primary | ICD-10-CM

## 2024-07-16 DIAGNOSIS — R92.8 ABNORMAL MAMMOGRAM OF RIGHT BREAST: Primary | ICD-10-CM

## 2024-07-16 DIAGNOSIS — R92.8 ABNORMAL MAMMOGRAM OF RIGHT BREAST: ICD-10-CM

## 2024-07-16 PROCEDURE — 76642 ULTRASOUND BREAST LIMITED: CPT

## 2024-07-19 ENCOUNTER — HOSPITAL ENCOUNTER (OUTPATIENT)
Dept: GENERAL RADIOLOGY | Age: 64
End: 2024-07-19
Payer: MEDICARE

## 2024-07-19 DIAGNOSIS — R92.8 ABNORMAL MAMMOGRAM: ICD-10-CM

## 2024-07-19 PROCEDURE — 88305 TISSUE EXAM BY PATHOLOGIST: CPT

## 2024-07-19 PROCEDURE — 19083 BX BREAST 1ST LESION US IMAG: CPT

## 2024-07-19 PROCEDURE — 88360 TUMOR IMMUNOHISTOCHEM/MANUAL: CPT

## 2024-07-19 PROCEDURE — 88342 IMHCHEM/IMCYTCHM 1ST ANTB: CPT

## 2024-07-19 PROCEDURE — 77065 DX MAMMO INCL CAD UNI: CPT

## 2024-07-19 NOTE — PROGRESS NOTES
Met with patient and her  Jimmy prior to her breast biopsy. Instructed on ultrasound guided right breast core needle biopsy with clip placement procedure. Denies use of blood thinners or aspirin products within the past 5 days. Instructed that results will be available in approximately 3-5 business days. She confirms that she has an active [facility] MyChart account and is agreeable to discussion of breast biopsy results by phone. Instructed that her physician will also be notified of results. Provided with folder containing contact information and post biopsy discharge instructions. Instructed to call if she has any questions or concerns about her biopsy. Verbalizes understanding.

## 2024-07-25 LAB — SURGICAL PATHOLOGY REPORT: NORMAL

## 2024-07-26 NOTE — PROGRESS NOTES
Informed Roma that recent biopsy demonstrated breast cancer    I will place order for MRI    Please schedule Friday 08/09 office at 8:30

## 2024-08-01 LAB — SURGICAL PATHOLOGY REPORT: NORMAL

## 2024-08-06 ENCOUNTER — TELEPHONE (OUTPATIENT)
Dept: BREAST CENTER | Age: 64
End: 2024-08-06

## 2024-08-06 DIAGNOSIS — C50.919 TRIPLE NEGATIVE BREAST CANCER (HCC): Primary | ICD-10-CM

## 2024-08-06 DIAGNOSIS — C50.911 MALIGNANT NEOPLASM OF RIGHT FEMALE BREAST, UNSPECIFIED ESTROGEN RECEPTOR STATUS, UNSPECIFIED SITE OF BREAST (HCC): ICD-10-CM

## 2024-08-06 NOTE — TELEPHONE ENCOUNTER
Called and spoke with patient regarding her upcoming appointment  and give her a date for her breast MRI. MRI scheduled for 8/15/24 at 1 pm. Told her the schedulers will be calling her to go over some questions. Asked patient to arrive at 730 am on 8/9/24 for CXR and will meet with Dr Santiago after that for comprehensive consultation.    Electronically signed by Stacey Barnse RN on 8/6/24 at 2:04 PM EDT

## 2024-08-09 ENCOUNTER — OFFICE VISIT (OUTPATIENT)
Dept: BREAST CENTER | Age: 64
End: 2024-08-09
Payer: MEDICARE

## 2024-08-09 ENCOUNTER — HOSPITAL ENCOUNTER (OUTPATIENT)
Dept: GENERAL RADIOLOGY | Age: 64
End: 2024-08-09
Attending: SURGERY
Payer: MEDICARE

## 2024-08-09 ENCOUNTER — TELEPHONE (OUTPATIENT)
Dept: CASE MANAGEMENT | Age: 64
End: 2024-08-09

## 2024-08-09 VITALS
OXYGEN SATURATION: 98 % | WEIGHT: 205 LBS | DIASTOLIC BLOOD PRESSURE: 80 MMHG | HEART RATE: 70 BPM | RESPIRATION RATE: 14 BRPM | BODY MASS INDEX: 36.32 KG/M2 | TEMPERATURE: 97.9 F | SYSTOLIC BLOOD PRESSURE: 130 MMHG | HEIGHT: 63 IN

## 2024-08-09 DIAGNOSIS — C50.919 TRIPLE NEGATIVE BREAST CANCER (HCC): Primary | ICD-10-CM

## 2024-08-09 DIAGNOSIS — Z80.3 FAMILY HISTORY OF MALIGNANT NEOPLASM OF BREAST: ICD-10-CM

## 2024-08-09 DIAGNOSIS — Z85.3 PERSONAL HISTORY OF MALIGNANT NEOPLASM OF BREAST: ICD-10-CM

## 2024-08-09 DIAGNOSIS — C50.919 TRIPLE NEGATIVE BREAST CANCER (HCC): ICD-10-CM

## 2024-08-09 PROBLEM — Z17.421 TRIPLE NEGATIVE BREAST CANCER (HCC): Status: ACTIVE | Noted: 2024-08-09

## 2024-08-09 LAB
BASOPHILS ABSOLUTE: 0.05 K/UL (ref 0–0.2)
BASOPHILS RELATIVE PERCENT: 1 % (ref 0–2)
EOSINOPHILS ABSOLUTE: 0.21 K/UL (ref 0.05–0.5)
EOSINOPHILS RELATIVE PERCENT: 3 % (ref 0–6)
HCT VFR BLD CALC: 41.2 % (ref 34–48)
HEMOGLOBIN: 13.6 G/DL (ref 11.5–15.5)
IMMATURE GRANULOCYTES %: 0 % (ref 0–5)
IMMATURE GRANULOCYTES ABSOLUTE: <0.03 K/UL (ref 0–0.58)
LYMPHOCYTES ABSOLUTE: 3 K/UL (ref 1.5–4)
LYMPHOCYTES RELATIVE PERCENT: 39 % (ref 20–42)
MCH RBC QN AUTO: 30.1 PG (ref 26–35)
MCHC RBC AUTO-ENTMCNC: 33 G/DL (ref 32–34.5)
MCV RBC AUTO: 91.2 FL (ref 80–99.9)
MONOCYTES ABSOLUTE: 0.43 K/UL (ref 0.1–0.95)
MONOCYTES RELATIVE PERCENT: 6 % (ref 2–12)
NEUTROPHILS ABSOLUTE: 3.91 K/UL (ref 1.8–7.3)
NEUTROPHILS RELATIVE PERCENT: 51 % (ref 43–80)
PDW BLD-RTO: 12.8 % (ref 11.5–15)
PLATELET # BLD: 237 K/UL (ref 130–450)
PMV BLD AUTO: 11.1 FL (ref 7–12)
RBC # BLD: 4.52 M/UL (ref 3.5–5.5)
WBC # BLD: 7.6 K/UL (ref 4.5–11.5)

## 2024-08-09 PROCEDURE — 36415 COLL VENOUS BLD VENIPUNCTURE: CPT | Performed by: SURGERY

## 2024-08-09 PROCEDURE — 71046 X-RAY EXAM CHEST 2 VIEWS: CPT

## 2024-08-09 PROCEDURE — 99213 OFFICE O/P EST LOW 20 MIN: CPT | Performed by: SURGERY

## 2024-08-09 NOTE — PATIENT INSTRUCTIONS
Breast MRI scheduled 8/15/24 - arrival time 12:30pm ([facility])    Genetic testing & regular lab work drawn today     You will need to contact your family doctor and get medical clearance to have surgery scheduled. There is no specific form just need note stating cleared for surgery and faxed to [phone]. Once clearance is obtained office will call with surgery date and time.

## 2024-08-09 NOTE — TELEPHONE ENCOUNTER
Met with patient regarding her recent breast cancer diagnosis at surgical consultation appointment with . Reviewed pathology report.Instructed patient on her  breast biopsy pathology findings including cancer type (IDC) and hormone receptor status (ER-, MS-, Her2- by Fish). Instructed on next steps including breast surgery options per 's recommendations and any additional imaging that may be required. Provided with extensive literature including \"Be A Survivor: Your guide to Breast Cancer Treatment\", chapter 4 reviewed, Your Guide to Your Breast Cancer Pathology Report, NCCN Patient Resource card,American College of Surgeons Exercises after Breast Surgery, written information from [employer] Lymphedema:The Basics and American Cancer Society Clinical Trials.Today patient received copy of their pathology report as well as a list of [facility] Oncology providers, information on diagnosis, transportation resources and local/online support group resources.I also provided patient with Triple Negative [employer] flyer and support groups along with Patient Resource Booklet on Triple Negative Breast Cancer Encouraged patient to call the office with any medical oncology questions. Patient verbalizes understanding and appreciative of nurse navigator visit. FLOR HuertaN,RN-OCN

## 2024-08-09 NOTE — PROGRESS NOTES
regional and systemic risk and therapy.  We discussed the role of surgery and/or radiation and/or systemic therapies in breast cancer based on NCCN guidelines.  I shared with her the equivalency of breast conserving therapy in comparison to mastectomy for most patients with early stage disease.  In the scenario where a patient would require or choose a mastectomy we did discuss reconstructive options.  We also talked about the rationale and techniques of sampling or clearing axillary lymph nodes in breast cancer patients.  I informed the patient of the need to meet with medical and radiation oncology to discuss adjuvant therapies following completion of the surgical procedure.    With respect to her case specifically I informed Roma that it does appear that she has an early stage cancer.  It may be multicentric.  I did discuss with her the high risk nature of triple negative breast cancer.    I informed the patient that I believe she should have a breast MRI and that is scheduled for next week.  If in fact there appear to be any tumor masses greater than a centimeter that I believe the patient will be referred for neoadjuvant treatment.  If alternatively she simply has multiple smaller areas of invasive cancer adjacent to each other that I believe we will proceed with a 2 site Magseed lumpectomy and sentinel lymph node biopsy.    Today in the office I explained to her the procedural aspects of the surgery.  We talked about the most common, but unlikely, complications including bleeding, infection, cosmetic imperfections, nerve injury and lymphedema.  I also cautioned her as to the possibility of a second surgery if there are issues with surgical margins and or significant debbie involvement.    We did discuss the issue of her family history.  I did discuss the implications and potential benefits of genetic testing and that test was sent today in the office.    The patient will require PCP clearance prior to her

## 2024-08-10 LAB
ALBUMIN: 4.1 G/DL (ref 3.5–5.2)
ALP BLD-CCNC: 71 U/L (ref 35–104)
ALT SERPL-CCNC: 20 U/L (ref 0–32)
ANION GAP SERPL CALCULATED.3IONS-SCNC: 15 MMOL/L (ref 7–16)
AST SERPL-CCNC: 21 U/L (ref 0–31)
BILIRUB SERPL-MCNC: 0.4 MG/DL (ref 0–1.2)
BUN BLDV-MCNC: 23 MG/DL (ref 6–23)
CALCIUM SERPL-MCNC: 9.3 MG/DL (ref 8.6–10.2)
CHLORIDE BLD-SCNC: 101 MMOL/L (ref 98–107)
CO2: 23 MMOL/L (ref 22–29)
CREAT SERPL-MCNC: 0.7 MG/DL (ref 0.5–1)
GFR, ESTIMATED: 90 ML/MIN/1.73M2
GLUCOSE BLD-MCNC: 96 MG/DL (ref 74–99)
POTASSIUM SERPL-SCNC: 4 MMOL/L (ref 3.5–5)
SODIUM BLD-SCNC: 139 MMOL/L (ref 132–146)
TOTAL PROTEIN: 7.5 G/DL (ref 6.4–8.3)

## 2024-08-15 ENCOUNTER — HOSPITAL ENCOUNTER (OUTPATIENT)
Dept: MRI IMAGING | Age: 64
Discharge: HOME | End: 2024-08-17
Payer: MEDICARE

## 2024-08-15 DIAGNOSIS — C50.919 TRIPLE NEGATIVE BREAST CANCER (HCC): ICD-10-CM

## 2024-08-15 DIAGNOSIS — C50.911 MALIGNANT NEOPLASM OF RIGHT FEMALE BREAST, UNSPECIFIED ESTROGEN RECEPTOR STATUS, UNSPECIFIED SITE OF BREAST (HCC): ICD-10-CM

## 2024-08-15 PROCEDURE — C8908 MRI W/O FOL W/CONT, BREAST,: HCPCS

## 2024-08-15 PROCEDURE — A9585 GADOBUTROL INJECTION: HCPCS | Performed by: RADIOLOGY

## 2024-08-15 PROCEDURE — 6360000004 HC RX CONTRAST MEDICATION: Performed by: RADIOLOGY

## 2024-08-15 RX ORDER — GADOBUTROL 604.72 MG/ML
9 INJECTION INTRAVENOUS
Status: COMPLETED | OUTPATIENT
Start: 2024-08-15 | End: 2024-08-15

## 2024-08-15 RX ADMIN — GADOBUTROL 9 ML: 604.72 INJECTION INTRAVENOUS at 13:46

## 2024-08-17 LAB
Lab: NEGATIVE
Lab: NORMAL

## 2024-08-19 ENCOUNTER — TELEPHONE (OUTPATIENT)
Dept: BREAST CENTER | Age: 64
End: 2024-08-19

## 2024-08-19 DIAGNOSIS — C50.919 TRIPLE NEGATIVE BREAST CANCER (HCC): Primary | ICD-10-CM

## 2024-08-19 NOTE — TELEPHONE ENCOUNTER
Discussed MRI and genetics with Roma.  Referral placed to Dr Castellano for walter.  Will need mid chemo visit

## 2024-08-21 ENCOUNTER — TELEPHONE (OUTPATIENT)
Dept: BREAST CENTER | Age: 64
End: 2024-08-21

## 2024-08-21 NOTE — TELEPHONE ENCOUNTER
Referral submitted to [facility] -- Patient information has been faxed - MA contacted [facility] - spoke to Lay - They did receive the fax and they will contact patient with an appointment.

## 2024-08-23 ENCOUNTER — OFFICE VISIT (OUTPATIENT)
Dept: PRIMARY CARE CLINIC | Age: 64
End: 2024-08-23
Payer: MEDICARE

## 2024-08-23 VITALS
WEIGHT: 206 LBS | SYSTOLIC BLOOD PRESSURE: 130 MMHG | TEMPERATURE: 97.5 F | DIASTOLIC BLOOD PRESSURE: 80 MMHG | HEART RATE: 74 BPM | OXYGEN SATURATION: 96 % | BODY MASS INDEX: 36.49 KG/M2

## 2024-08-23 DIAGNOSIS — E78.2 MIXED HYPERLIPIDEMIA: ICD-10-CM

## 2024-08-23 DIAGNOSIS — Z00.00 WELCOME TO MEDICARE PREVENTIVE VISIT: Primary | ICD-10-CM

## 2024-08-23 DIAGNOSIS — E79.0 ELEVATED BLOOD URIC ACID LEVEL: ICD-10-CM

## 2024-08-23 DIAGNOSIS — Z12.11 COLON CANCER SCREENING: ICD-10-CM

## 2024-08-23 DIAGNOSIS — I49.9 CARDIAC ARRHYTHMIA, UNSPECIFIED CARDIAC ARRHYTHMIA TYPE: ICD-10-CM

## 2024-08-23 DIAGNOSIS — F32.A CHRONIC DEPRESSION: ICD-10-CM

## 2024-08-23 DIAGNOSIS — E03.9 HYPOTHYROIDISM, UNSPECIFIED TYPE: ICD-10-CM

## 2024-08-23 DIAGNOSIS — I10 ESSENTIAL HYPERTENSION: ICD-10-CM

## 2024-08-23 PROCEDURE — 1123F ACP DISCUSS/DSCN MKR DOCD: CPT | Performed by: INTERNAL MEDICINE

## 2024-08-23 PROCEDURE — 3075F SYST BP GE 130 - 139MM HG: CPT | Performed by: INTERNAL MEDICINE

## 2024-08-23 PROCEDURE — 3017F COLORECTAL CA SCREEN DOC REV: CPT | Performed by: INTERNAL MEDICINE

## 2024-08-23 PROCEDURE — 3079F DIAST BP 80-89 MM HG: CPT | Performed by: INTERNAL MEDICINE

## 2024-08-23 PROCEDURE — G0402 INITIAL PREVENTIVE EXAM: HCPCS | Performed by: INTERNAL MEDICINE

## 2024-08-23 RX ORDER — LEVOTHYROXINE SODIUM 0.07 MG/1
TABLET ORAL
Qty: 90 TABLET | Refills: 1 | Status: SHIPPED | OUTPATIENT
Start: 2024-08-23

## 2024-08-23 RX ORDER — ALLOPURINOL 300 MG/1
TABLET ORAL
Qty: 90 TABLET | Refills: 1 | Status: SHIPPED | OUTPATIENT
Start: 2024-08-23

## 2024-08-23 RX ORDER — AMLODIPINE BESYLATE 5 MG/1
5 TABLET ORAL DAILY
Qty: 90 TABLET | Refills: 1 | Status: SHIPPED | OUTPATIENT
Start: 2024-08-23

## 2024-08-23 RX ORDER — PRAVASTATIN SODIUM 40 MG
TABLET ORAL
Qty: 90 TABLET | Refills: 1 | Status: SHIPPED | OUTPATIENT
Start: 2024-08-23

## 2024-08-23 RX ORDER — FENOFIBRATE 160 MG/1
TABLET ORAL
Qty: 90 TABLET | Refills: 1 | Status: SHIPPED | OUTPATIENT
Start: 2024-08-23

## 2024-08-23 RX ORDER — METOPROLOL TARTRATE 50 MG/1
50 TABLET, FILM COATED ORAL 2 TIMES DAILY
Qty: 180 TABLET | Refills: 1 | Status: SHIPPED | OUTPATIENT
Start: 2024-08-23

## 2024-08-23 RX ORDER — DULOXETIN HYDROCHLORIDE 60 MG/1
CAPSULE, DELAYED RELEASE ORAL
Qty: 90 CAPSULE | Refills: 1 | Status: SHIPPED | OUTPATIENT
Start: 2024-08-23

## 2024-08-23 SDOH — ECONOMIC STABILITY: FOOD INSECURITY: WITHIN THE PAST 12 MONTHS, YOU WORRIED THAT YOUR FOOD WOULD RUN OUT BEFORE YOU GOT MONEY TO BUY MORE.: NEVER TRUE

## 2024-08-23 SDOH — ECONOMIC STABILITY: FOOD INSECURITY: WITHIN THE PAST 12 MONTHS, THE FOOD YOU BOUGHT JUST DIDN'T LAST AND YOU DIDN'T HAVE MONEY TO GET MORE.: NEVER TRUE

## 2024-08-23 SDOH — ECONOMIC STABILITY: INCOME INSECURITY: HOW HARD IS IT FOR YOU TO PAY FOR THE VERY BASICS LIKE FOOD, HOUSING, MEDICAL CARE, AND HEATING?: NOT HARD AT ALL

## 2024-08-23 ASSESSMENT — PATIENT HEALTH QUESTIONNAIRE - PHQ9
SUM OF ALL RESPONSES TO PHQ QUESTIONS 1-9: 0
SUM OF ALL RESPONSES TO PHQ QUESTIONS 1-9: 0
6. FEELING BAD ABOUT YOURSELF - OR THAT YOU ARE A FAILURE OR HAVE LET YOURSELF OR YOUR FAMILY DOWN: NOT AT ALL
2. FEELING DOWN, DEPRESSED OR HOPELESS: NOT AT ALL
SUM OF ALL RESPONSES TO PHQ QUESTIONS 1-9: 0
3. TROUBLE FALLING OR STAYING ASLEEP: NOT AT ALL
7. TROUBLE CONCENTRATING ON THINGS, SUCH AS READING THE NEWSPAPER OR WATCHING TELEVISION: NOT AT ALL
8. MOVING OR SPEAKING SO SLOWLY THAT OTHER PEOPLE COULD HAVE NOTICED. OR THE OPPOSITE, BEING SO FIGETY OR RESTLESS THAT YOU HAVE BEEN MOVING AROUND A LOT MORE THAN USUAL: NOT AT ALL
1. LITTLE INTEREST OR PLEASURE IN DOING THINGS: NOT AT ALL
4. FEELING TIRED OR HAVING LITTLE ENERGY: NOT AT ALL
9. THOUGHTS THAT YOU WOULD BE BETTER OFF DEAD, OR OF HURTING YOURSELF: NOT AT ALL
10. IF YOU CHECKED OFF ANY PROBLEMS, HOW DIFFICULT HAVE THESE PROBLEMS MADE IT FOR YOU TO DO YOUR WORK, TAKE CARE OF THINGS AT HOME, OR GET ALONG WITH OTHER PEOPLE: NOT DIFFICULT AT ALL
5. POOR APPETITE OR OVEREATING: NOT AT ALL
SUM OF ALL RESPONSES TO PHQ QUESTIONS 1-9: 0
SUM OF ALL RESPONSES TO PHQ9 QUESTIONS 1 & 2: 0

## 2024-08-23 ASSESSMENT — LIFESTYLE VARIABLES
HOW MANY STANDARD DRINKS CONTAINING ALCOHOL DO YOU HAVE ON A TYPICAL DAY: PATIENT DOES NOT DRINK
HOW OFTEN DO YOU HAVE A DRINK CONTAINING ALCOHOL: NEVER

## 2024-08-23 NOTE — PROGRESS NOTES
8/23/24    Roma Dias, a female of 63 y.o. presents today for Medicare AWV      Diagnoses and all orders for this visit:  Mixed hyperlipidemia  -     pravastatin (PRAVACHOL) 40 MG tablet; 1 po qd q hs  -     fenofibrate (TRIGLIDE) 160 MG tablet; 1 po qd  Essential hypertension  -     metoprolol tartrate (LOPRESSOR) 50 MG tablet; Take 1 tablet by mouth 2 times daily  -     amLODIPine (NORVASC) 5 MG tablet; Take 1 tablet by mouth daily  Cardiac arrhythmia, unspecified cardiac arrhythmia type  -     metoprolol tartrate (LOPRESSOR) 50 MG tablet; Take 1 tablet by mouth 2 times daily  Hypothyroidism, unspecified type  -     levothyroxine (EUTHYROX) 75 MCG tablet; Take 1 tablet by mouth once daily  Chronic depression  -     DULoxetine (CYMBALTA) 60 MG extended release capsule; Take 1 tablet daily  Elevated blood uric acid level  -     allopurinol (ZYLOPRIM) 300 MG tablet; 1 po qd      Assessment and Plan  Low cardiac risk for surgery  Discussed tobacco cessation - considering Nicoderm but she deferred  Refusing CT screening of the lungs  Obtain Cologuard testing  Pending follow up with breast surgery and oncology  Considering second opinion  Discussed mood, deferring changes. Noted poor reaction with Wellbutrin historically.         Electronically signed by Lg Figueroa DO on 8/23/2024 at 2:31 PM    Medicare Annual Wellness Visit    Roma Dias is here for Medicare AWV    Assessment & Plan   Mixed hyperlipidemia  -     pravastatin (PRAVACHOL) 40 MG tablet; 1 po qd q hs, Disp-90 tablet, R-1Normal  -     fenofibrate (TRIGLIDE) 160 MG tablet; 1 po qd, Disp-90 tablet, R-1Normal  Essential hypertension  -     metoprolol tartrate (LOPRESSOR) 50 MG tablet; Take 1 tablet by mouth 2 times daily, Disp-180 tablet, R-1Normal  -     amLODIPine (NORVASC) 5 MG tablet; Take 1 tablet by mouth daily, Disp-90 tablet, R-1Normal  Cardiac arrhythmia, unspecified cardiac arrhythmia type  -     metoprolol tartrate

## 2024-09-06 ENCOUNTER — PREP FOR PROCEDURE (OUTPATIENT)
Dept: VASCULAR SURGERY | Age: 64
End: 2024-09-06

## 2024-09-06 ENCOUNTER — TELEPHONE (OUTPATIENT)
Dept: VASCULAR SURGERY | Age: 64
End: 2024-09-06

## 2024-09-06 NOTE — TELEPHONE ENCOUNTER
Request for insertion of a venous port received from Dr. Castellano.  Spoke with the pt, scheduled insertion with Dr. Gabriel 9/10/24 with instructions to report to [facility] at 9:30 a.m and to be NPO after midnight the night before except heart and/or BP meds the morning of with sips of water.  She will call the office with any questions.

## 2024-09-10 ENCOUNTER — ANESTHESIA (OUTPATIENT)
Dept: OPERATING ROOM | Age: 64
End: 2024-09-10
Payer: MEDICARE

## 2024-09-10 ENCOUNTER — ANESTHESIA EVENT (OUTPATIENT)
Dept: OPERATING ROOM | Age: 64
End: 2024-09-10
Payer: MEDICARE

## 2024-09-10 ENCOUNTER — APPOINTMENT (OUTPATIENT)
Dept: GENERAL RADIOLOGY | Age: 64
End: 2024-09-10
Attending: STUDENT IN AN ORGANIZED HEALTH CARE EDUCATION/TRAINING PROGRAM
Payer: MEDICARE

## 2024-09-10 ENCOUNTER — HOSPITAL ENCOUNTER (OUTPATIENT)
Age: 64
Setting detail: OUTPATIENT SURGERY
Discharge: HOME | End: 2024-09-10
Attending: STUDENT IN AN ORGANIZED HEALTH CARE EDUCATION/TRAINING PROGRAM | Admitting: STUDENT IN AN ORGANIZED HEALTH CARE EDUCATION/TRAINING PROGRAM
Payer: MEDICARE

## 2024-09-10 VITALS
OXYGEN SATURATION: 98 % | WEIGHT: 205 LBS | TEMPERATURE: 97.4 F | BODY MASS INDEX: 36.32 KG/M2 | DIASTOLIC BLOOD PRESSURE: 83 MMHG | RESPIRATION RATE: 16 BRPM | HEIGHT: 63 IN | SYSTOLIC BLOOD PRESSURE: 151 MMHG | HEART RATE: 63 BPM

## 2024-09-10 DIAGNOSIS — C50.919 TRIPLE NEGATIVE BREAST CANCER (HCC): Primary | ICD-10-CM

## 2024-09-10 LAB
ANION GAP SERPL CALCULATED.3IONS-SCNC: 10 MMOL/L (ref 7–16)
BUN SERPL-MCNC: 13 MG/DL (ref 6–23)
CALCIUM SERPL-MCNC: 9.2 MG/DL (ref 8.6–10.2)
CHLORIDE SERPL-SCNC: 105 MMOL/L (ref 98–107)
CO2 SERPL-SCNC: 28 MMOL/L (ref 22–29)
CREAT SERPL-MCNC: 0.6 MG/DL (ref 0.5–1)
ERYTHROCYTE [DISTWIDTH] IN BLOOD BY AUTOMATED COUNT: 12.9 % (ref 11.5–15)
GFR, ESTIMATED: >90 ML/MIN/1.73M2
GLUCOSE SERPL-MCNC: 91 MG/DL (ref 74–99)
HCT VFR BLD AUTO: 42.7 % (ref 34–48)
HGB BLD-MCNC: 13.6 G/DL (ref 11.5–15.5)
MCH RBC QN AUTO: 28.9 PG (ref 26–35)
MCHC RBC AUTO-ENTMCNC: 31.9 G/DL (ref 32–34.5)
MCV RBC AUTO: 90.7 FL (ref 80–99.9)
PLATELET # BLD AUTO: 224 K/UL (ref 130–450)
PMV BLD AUTO: 11 FL (ref 7–12)
POTASSIUM SERPL-SCNC: 4.4 MMOL/L (ref 3.5–5)
RBC # BLD AUTO: 4.71 M/UL (ref 3.5–5.5)
SODIUM SERPL-SCNC: 143 MMOL/L (ref 132–146)
WBC OTHER # BLD: 6.4 K/UL (ref 4.5–11.5)

## 2024-09-10 PROCEDURE — C1788 PORT, INDWELLING, IMP: HCPCS | Performed by: STUDENT IN AN ORGANIZED HEALTH CARE EDUCATION/TRAINING PROGRAM

## 2024-09-10 PROCEDURE — 80048 BASIC METABOLIC PNL TOTAL CA: CPT

## 2024-09-10 PROCEDURE — 7100000010 HC PHASE II RECOVERY - FIRST 15 MIN: Performed by: STUDENT IN AN ORGANIZED HEALTH CARE EDUCATION/TRAINING PROGRAM

## 2024-09-10 PROCEDURE — 2500000003 HC RX 250 WO HCPCS: Performed by: STUDENT IN AN ORGANIZED HEALTH CARE EDUCATION/TRAINING PROGRAM

## 2024-09-10 PROCEDURE — 6360000002 HC RX W HCPCS: Performed by: NURSE ANESTHETIST, CERTIFIED REGISTERED

## 2024-09-10 PROCEDURE — 3700000001 HC ADD 15 MINUTES (ANESTHESIA): Performed by: STUDENT IN AN ORGANIZED HEALTH CARE EDUCATION/TRAINING PROGRAM

## 2024-09-10 PROCEDURE — 85027 COMPLETE CBC AUTOMATED: CPT

## 2024-09-10 PROCEDURE — 6360000002 HC RX W HCPCS: Performed by: STUDENT IN AN ORGANIZED HEALTH CARE EDUCATION/TRAINING PROGRAM

## 2024-09-10 PROCEDURE — A4217 STERILE WATER/SALINE, 500 ML: HCPCS | Performed by: STUDENT IN AN ORGANIZED HEALTH CARE EDUCATION/TRAINING PROGRAM

## 2024-09-10 PROCEDURE — 7100000011 HC PHASE II RECOVERY - ADDTL 15 MIN: Performed by: STUDENT IN AN ORGANIZED HEALTH CARE EDUCATION/TRAINING PROGRAM

## 2024-09-10 PROCEDURE — 71045 X-RAY EXAM CHEST 1 VIEW: CPT

## 2024-09-10 PROCEDURE — 3600000013 HC SURGERY LEVEL 3 ADDTL 15MIN: Performed by: STUDENT IN AN ORGANIZED HEALTH CARE EDUCATION/TRAINING PROGRAM

## 2024-09-10 PROCEDURE — 6360000002 HC RX W HCPCS

## 2024-09-10 PROCEDURE — 2709999900 HC NON-CHARGEABLE SUPPLY: Performed by: STUDENT IN AN ORGANIZED HEALTH CARE EDUCATION/TRAINING PROGRAM

## 2024-09-10 PROCEDURE — 3600000003 HC SURGERY LEVEL 3 BASE: Performed by: STUDENT IN AN ORGANIZED HEALTH CARE EDUCATION/TRAINING PROGRAM

## 2024-09-10 PROCEDURE — 3700000000 HC ANESTHESIA ATTENDED CARE: Performed by: STUDENT IN AN ORGANIZED HEALTH CARE EDUCATION/TRAINING PROGRAM

## 2024-09-10 PROCEDURE — 2580000003 HC RX 258: Performed by: STUDENT IN AN ORGANIZED HEALTH CARE EDUCATION/TRAINING PROGRAM

## 2024-09-10 PROCEDURE — 2580000003 HC RX 258

## 2024-09-10 PROCEDURE — C1894 INTRO/SHEATH, NON-LASER: HCPCS | Performed by: STUDENT IN AN ORGANIZED HEALTH CARE EDUCATION/TRAINING PROGRAM

## 2024-09-10 DEVICE — VACCESS CT POWER-INJECTABLE IMPLANTABLE PORT (WITH SUTURE PLUGS) (8F)
Type: IMPLANTABLE DEVICE | Site: CHEST | Status: FUNCTIONAL
Brand: VACCESS

## 2024-09-10 RX ORDER — MIDAZOLAM HYDROCHLORIDE 1 MG/ML
INJECTION INTRAMUSCULAR; INTRAVENOUS PRN
Status: DISCONTINUED | OUTPATIENT
Start: 2024-09-10 | End: 2024-09-10 | Stop reason: SDUPTHER

## 2024-09-10 RX ORDER — SODIUM CHLORIDE 9 MG/ML
INJECTION, SOLUTION INTRAVENOUS PRN
Status: DISCONTINUED | OUTPATIENT
Start: 2024-09-10 | End: 2024-09-10 | Stop reason: HOSPADM

## 2024-09-10 RX ORDER — OXYCODONE HYDROCHLORIDE 5 MG/1
5 TABLET ORAL EVERY 6 HOURS PRN
Qty: 15 TABLET | Refills: 0 | Status: SHIPPED | OUTPATIENT
Start: 2024-09-10 | End: 2024-09-15

## 2024-09-10 RX ORDER — ONDANSETRON 2 MG/ML
INJECTION INTRAMUSCULAR; INTRAVENOUS PRN
Status: DISCONTINUED | OUTPATIENT
Start: 2024-09-10 | End: 2024-09-10 | Stop reason: SDUPTHER

## 2024-09-10 RX ORDER — HEPARIN 100 UNIT/ML
SYRINGE INTRAVENOUS PRN
Status: DISCONTINUED | OUTPATIENT
Start: 2024-09-10 | End: 2024-09-10 | Stop reason: ALTCHOICE

## 2024-09-10 RX ORDER — FENTANYL CITRATE 50 UG/ML
INJECTION, SOLUTION INTRAMUSCULAR; INTRAVENOUS PRN
Status: DISCONTINUED | OUTPATIENT
Start: 2024-09-10 | End: 2024-09-10 | Stop reason: SDUPTHER

## 2024-09-10 RX ORDER — LIDOCAINE HYDROCHLORIDE 20 MG/ML
INJECTION, SOLUTION INTRAVENOUS PRN
Status: DISCONTINUED | OUTPATIENT
Start: 2024-09-10 | End: 2024-09-10 | Stop reason: SDUPTHER

## 2024-09-10 RX ORDER — SODIUM CHLORIDE 0.9 % (FLUSH) 0.9 %
5-40 SYRINGE (ML) INJECTION PRN
Status: DISCONTINUED | OUTPATIENT
Start: 2024-09-10 | End: 2024-09-10 | Stop reason: HOSPADM

## 2024-09-10 RX ORDER — PROPOFOL 10 MG/ML
INJECTION, EMULSION INTRAVENOUS CONTINUOUS PRN
Status: DISCONTINUED | OUTPATIENT
Start: 2024-09-10 | End: 2024-09-10 | Stop reason: SDUPTHER

## 2024-09-10 RX ORDER — SODIUM CHLORIDE 0.9 % (FLUSH) 0.9 %
5-40 SYRINGE (ML) INJECTION EVERY 12 HOURS SCHEDULED
Status: DISCONTINUED | OUTPATIENT
Start: 2024-09-10 | End: 2024-09-10 | Stop reason: HOSPADM

## 2024-09-10 RX ADMIN — ONDANSETRON HYDROCHLORIDE 4 MG: 2 SOLUTION INTRAMUSCULAR; INTRAVENOUS at 13:16

## 2024-09-10 RX ADMIN — PROPOFOL 50 MCG/KG/MIN: 10 INJECTION, EMULSION INTRAVENOUS at 13:16

## 2024-09-10 RX ADMIN — MIDAZOLAM 2 MG: 1 INJECTION INTRAMUSCULAR; INTRAVENOUS at 13:05

## 2024-09-10 RX ADMIN — LIDOCAINE HYDROCHLORIDE 100 MG: 20 INJECTION, SOLUTION INTRAVENOUS at 13:16

## 2024-09-10 RX ADMIN — CEFAZOLIN 2000 MG: 2 INJECTION, POWDER, FOR SOLUTION INTRAMUSCULAR; INTRAVENOUS at 13:22

## 2024-09-10 RX ADMIN — SODIUM CHLORIDE: 9 INJECTION, SOLUTION INTRAVENOUS at 13:05

## 2024-09-10 RX ADMIN — FENTANYL CITRATE 50 MCG: 50 INJECTION, SOLUTION INTRAMUSCULAR; INTRAVENOUS at 13:16

## 2024-09-10 RX ADMIN — SODIUM CHLORIDE: 9 INJECTION, SOLUTION INTRAVENOUS at 13:15

## 2024-09-10 ASSESSMENT — COPD QUESTIONNAIRES: CAT_SEVERITY: MODERATE

## 2024-09-10 ASSESSMENT — LIFESTYLE VARIABLES: SMOKING_STATUS: 1

## 2024-09-10 ASSESSMENT — ENCOUNTER SYMPTOMS: DYSPNEA ACTIVITY LEVEL: NO INTERVAL CHANGE

## 2024-09-10 ASSESSMENT — PAIN - FUNCTIONAL ASSESSMENT: PAIN_FUNCTIONAL_ASSESSMENT: NONE - DENIES PAIN

## 2024-09-23 ENCOUNTER — OFFICE VISIT (OUTPATIENT)
Dept: VASCULAR SURGERY | Age: 64
End: 2024-09-23

## 2024-09-23 DIAGNOSIS — C50.919 TRIPLE NEGATIVE BREAST CANCER (HCC): Primary | ICD-10-CM

## 2024-09-23 PROCEDURE — 99024 POSTOP FOLLOW-UP VISIT: CPT | Performed by: STUDENT IN AN ORGANIZED HEALTH CARE EDUCATION/TRAINING PROGRAM

## 2024-11-22 ENCOUNTER — TELEPHONE (OUTPATIENT)
Dept: BREAST CENTER | Age: 64
End: 2024-11-22

## 2024-11-22 NOTE — TELEPHONE ENCOUNTER
RN attempted to contact patient to discuss when would be mid chemo, patient sees . RN reached patient VM and left detailed message of why was calling and office number for patient to call office back.      Electronically signed by Eunice Mccrary RN on 11/22/24 at 10:42 AM EST

## 2024-12-23 ENCOUNTER — TELEPHONE (OUTPATIENT)
Dept: BREAST CENTER | Age: 64
End: 2024-12-23

## 2024-12-23 NOTE — TELEPHONE ENCOUNTER
RN attempted to contact patient to schedule midway chemo. RN reached patient voicemail and left detailed message of why was calling and office number for patient to call office back and set up appt.      Per  note patient to be done with chemo around 2/13/25.    Electronically signed by Eunice Mccollum RN on 12/23/24 at 10:48 AM EST

## 2025-01-20 ENCOUNTER — OFFICE VISIT (OUTPATIENT)
Dept: BREAST CENTER | Age: 65
End: 2025-01-20
Payer: MEDICARE

## 2025-01-20 VITALS
RESPIRATION RATE: 20 BRPM | SYSTOLIC BLOOD PRESSURE: 108 MMHG | TEMPERATURE: 98 F | HEIGHT: 63 IN | OXYGEN SATURATION: 98 % | HEART RATE: 89 BPM | DIASTOLIC BLOOD PRESSURE: 62 MMHG | BODY MASS INDEX: 35.79 KG/M2 | WEIGHT: 202 LBS

## 2025-01-20 DIAGNOSIS — C50.919 TRIPLE NEGATIVE BREAST CANCER (HCC): Primary | ICD-10-CM

## 2025-01-20 DIAGNOSIS — C50.811 MALIGNANT NEOPLASM OF OVERLAPPING SITES OF RIGHT BREAST IN FEMALE, ESTROGEN RECEPTOR NEGATIVE (HCC): ICD-10-CM

## 2025-01-20 DIAGNOSIS — Z17.421 TRIPLE NEGATIVE BREAST CANCER (HCC): Primary | ICD-10-CM

## 2025-01-20 DIAGNOSIS — Z17.1 MALIGNANT NEOPLASM OF OVERLAPPING SITES OF RIGHT BREAST IN FEMALE, ESTROGEN RECEPTOR NEGATIVE (HCC): ICD-10-CM

## 2025-01-20 PROCEDURE — G8427 DOCREV CUR MEDS BY ELIG CLIN: HCPCS | Performed by: SURGERY

## 2025-01-20 PROCEDURE — 3078F DIAST BP <80 MM HG: CPT | Performed by: SURGERY

## 2025-01-20 PROCEDURE — 3017F COLORECTAL CA SCREEN DOC REV: CPT | Performed by: SURGERY

## 2025-01-20 PROCEDURE — 3074F SYST BP LT 130 MM HG: CPT | Performed by: SURGERY

## 2025-01-20 PROCEDURE — 4004F PT TOBACCO SCREEN RCVD TLK: CPT | Performed by: SURGERY

## 2025-01-20 PROCEDURE — G8417 CALC BMI ABV UP PARAM F/U: HCPCS | Performed by: SURGERY

## 2025-01-20 PROCEDURE — 99212 OFFICE O/P EST SF 10 MIN: CPT | Performed by: SURGERY

## 2025-01-20 PROCEDURE — 99213 OFFICE O/P EST LOW 20 MIN: CPT | Performed by: SURGERY

## 2025-01-20 PROCEDURE — G8400 PT W/DXA NO RESULTS DOC: HCPCS | Performed by: SURGERY

## 2025-01-20 PROCEDURE — 1090F PRES/ABSN URINE INCON ASSESS: CPT | Performed by: SURGERY

## 2025-01-20 PROCEDURE — 1159F MED LIST DOCD IN RCRD: CPT | Performed by: SURGERY

## 2025-01-20 PROCEDURE — 1123F ACP DISCUSS/DSCN MKR DOCD: CPT | Performed by: SURGERY

## 2025-01-20 NOTE — PROGRESS NOTES
Date of visit: 1/20/2025    11/13/23  07/15/24  08/09/24: New Cancer  01/20/25: Mid chemo    DIAGNOSIS:  1. (11/13/23) RIGHT (UOQ) radial scar (unexcised)  2. (08/09/24) RIGHT (10:00-6) invasive ductal carcinoma  Clinical stage: C1pF6M3  Grade 3  ER (-) AL (-) HER2 (2+ FSH neg))    IMAGING/PROCEDURES:  1. (07/26/23) BILATERAL st-mammogram: BIRADS-0  * RIGHT focal asymmetry  2. (08/15/23) RIGHT dt-mammogram: BIRADS-4  * RIGHT (UOQ) architectural distortion  3. (08/21/23) RIGHT stereotactic biopsy  4. (01/15/24) RIGHT dt-mammogram: BIRADS-2  * no changes  5. (07/15/24) BILATERAL dt-mammogram and US: BIRADS-4  * RIGHT (10:00-6) 1cm mass  * RIGHT (10:00-5) 6mm mass  * RIGHT (10:00-4) 6mm mass  * RIGHT axilla normal  6. (07/26/24) RIGHT (10:00-6) US biopsy  7. (08/15/24) MRI: BIRADS-6  * RIGHT (UOQ) 3 masses: 11, 6 and 6 mm  * Total enhancement 3cm    Breast History  Roma Dias was in the office for a mid chemotherapy visit.    Roma was diagnosed with a right breast triple negative malignancy in August 2024.  She was referred to is receiving neoadjuvant chemo and immunotherapy.    Breast Symptoms  Roma notes some bilateral breast discomfort but no other symptoms.    Breast Examination  A comprehensive breast examination demonstrates no concerns in either breast or either axilla.    Breast Imaging  Roma has had no updated imaging.    Assessment/Plan  Roma Dias was in the office for a mid chemotherapy visit.    I informed Roma the main purpose of the mid chemotherapy visit is to assess for any potential progression of disease and that began surgical planning.    I was happy to inform her that I do not believe there has been any progression.    With respect to surgical planning I shared with the patient that the high risk triple negative phenotype does not drive surgical decision making.  It is notable that she started with multifocal disease over a span of about 3 cm.  I do believe she will be a

## 2025-01-20 NOTE — PATIENT INSTRUCTIONS
Schedulers will call to schedule breast MRI. Once MRI is scheduled call office to schedule follow up with .     Any questions or concerns, please contact the office at [phone].

## 2025-02-17 ENCOUNTER — TELEPHONE (OUTPATIENT)
Dept: BREAST CENTER | Age: 65
End: 2025-02-17

## 2025-02-17 NOTE — TELEPHONE ENCOUNTER
[facility] contacted [facility] and requested patient most recent lab work.       Electronically signed by Eunice Mccollum RN on 2/17/25 at 12:04 PM EST

## 2025-02-17 NOTE — TELEPHONE ENCOUNTER
Patient spouse contacted our office stated patient completed Neoadjuvant chemotherapy 2/13/25 - patient will need MRI scheduled

## 2025-02-17 NOTE — TELEPHONE ENCOUNTER
Breast MRI taken to [facility] schedulers to contact patient.     Electronically signed by Eunice Mccollum RN on 2/17/25 at 12:00 PM EST     Messages left on pt cell phone voice mail that prescriptions have been sent to pharmacy.

## 2025-02-19 ENCOUNTER — TELEPHONE (OUTPATIENT)
Dept: BREAST CENTER | Age: 65
End: 2025-02-19

## 2025-02-19 NOTE — TELEPHONE ENCOUNTER
No prior authorization required for breast MRI  [id number] -- insurance - Medicare A&B    Patient is scheduled for 3/6/25 @ 11:30am

## 2025-03-23 DIAGNOSIS — E79.0 ELEVATED BLOOD URIC ACID LEVEL: ICD-10-CM

## 2025-03-23 DIAGNOSIS — I10 ESSENTIAL HYPERTENSION: ICD-10-CM

## 2025-03-23 DIAGNOSIS — E03.9 HYPOTHYROIDISM, UNSPECIFIED TYPE: ICD-10-CM

## 2025-03-23 DIAGNOSIS — F32.A CHRONIC DEPRESSION: ICD-10-CM

## 2025-03-23 DIAGNOSIS — E78.2 MIXED HYPERLIPIDEMIA: ICD-10-CM

## 2025-03-23 DIAGNOSIS — I49.9 CARDIAC ARRHYTHMIA, UNSPECIFIED CARDIAC ARRHYTHMIA TYPE: ICD-10-CM

## 2025-03-24 RX ORDER — LEVOTHYROXINE SODIUM 75 UG/1
TABLET ORAL
Qty: 90 TABLET | Refills: 1 | Status: SHIPPED | OUTPATIENT
Start: 2025-03-24

## 2025-03-24 RX ORDER — ALLOPURINOL 300 MG/1
TABLET ORAL
Qty: 90 TABLET | Refills: 1 | Status: SHIPPED | OUTPATIENT
Start: 2025-03-24

## 2025-03-24 RX ORDER — PRAVASTATIN SODIUM 40 MG
TABLET ORAL
Qty: 90 TABLET | Refills: 1 | Status: SHIPPED | OUTPATIENT
Start: 2025-03-24

## 2025-03-24 RX ORDER — DULOXETIN HYDROCHLORIDE 60 MG/1
CAPSULE, DELAYED RELEASE ORAL
Qty: 90 CAPSULE | Refills: 1 | Status: SHIPPED | OUTPATIENT
Start: 2025-03-24

## 2025-03-24 RX ORDER — METOPROLOL TARTRATE 50 MG
50 TABLET ORAL 2 TIMES DAILY
Qty: 180 TABLET | Refills: 1 | Status: SHIPPED | OUTPATIENT
Start: 2025-03-24

## 2025-03-24 RX ORDER — AMLODIPINE BESYLATE 5 MG/1
5 TABLET ORAL DAILY
Qty: 90 TABLET | Refills: 1 | Status: SHIPPED | OUTPATIENT
Start: 2025-03-24

## 2025-03-24 RX ORDER — FENOFIBRATE 160 MG/1
TABLET ORAL
Qty: 90 TABLET | Refills: 1 | Status: SHIPPED | OUTPATIENT
Start: 2025-03-24

## 2025-03-24 NOTE — TELEPHONE ENCOUNTER
Patients last appointment 8/23/2024.  Patients next scheduled appointment   Future Appointments   Date Time Provider Department Center   4/8/2025  1:00 PM [facility] Rad/Car

## 2025-04-08 ENCOUNTER — HOSPITAL ENCOUNTER (OUTPATIENT)
Dept: MRI IMAGING | Age: 65
Discharge: HOME | End: 2025-04-10
Attending: SURGERY
Payer: MEDICARE

## 2025-04-08 ENCOUNTER — RESULTS FOLLOW-UP (OUTPATIENT)
Dept: BREAST CENTER | Age: 65
End: 2025-04-08

## 2025-04-08 DIAGNOSIS — Z17.1 MALIGNANT NEOPLASM OF OVERLAPPING SITES OF RIGHT BREAST IN FEMALE, ESTROGEN RECEPTOR NEGATIVE: ICD-10-CM

## 2025-04-08 DIAGNOSIS — C50.919 TRIPLE NEGATIVE BREAST CANCER: ICD-10-CM

## 2025-04-08 DIAGNOSIS — Z17.421 TRIPLE NEGATIVE BREAST CANCER: ICD-10-CM

## 2025-04-08 DIAGNOSIS — C50.811 MALIGNANT NEOPLASM OF OVERLAPPING SITES OF RIGHT BREAST IN FEMALE, ESTROGEN RECEPTOR NEGATIVE: ICD-10-CM

## 2025-04-08 PROCEDURE — 6360000004 HC RX CONTRAST MEDICATION: Performed by: RADIOLOGY

## 2025-04-08 PROCEDURE — C8908 MRI W/O FOL W/CONT, BREAST,: HCPCS

## 2025-04-08 PROCEDURE — A9585 GADOBUTROL INJECTION: HCPCS | Performed by: RADIOLOGY

## 2025-04-08 RX ORDER — GADOBUTROL 604.72 MG/ML
9 INJECTION INTRAVENOUS
Status: COMPLETED | OUTPATIENT
Start: 2025-04-08 | End: 2025-04-08

## 2025-04-08 RX ADMIN — GADOBUTROL 9 ML: 604.72 INJECTION INTRAVENOUS at 13:12

## 2025-04-09 ENCOUNTER — TELEPHONE (OUTPATIENT)
Dept: BREAST CENTER | Age: 65
End: 2025-04-09

## 2025-04-09 NOTE — TELEPHONE ENCOUNTER
Called and spoke with patient to discuss scheduling end chemo visit after her recent breast MRI.  Appointment given and verified with patient.    Electronically signed by Stacey Barnes RN on 4/9/25 at 8:24 AM EDT

## 2025-04-29 ENCOUNTER — OFFICE VISIT (OUTPATIENT)
Dept: BREAST CENTER | Age: 65
End: 2025-04-29
Payer: MEDICARE

## 2025-04-29 ENCOUNTER — TELEPHONE (OUTPATIENT)
Dept: PRIMARY CARE CLINIC | Age: 65
End: 2025-04-29

## 2025-04-29 VITALS
OXYGEN SATURATION: 96 % | HEIGHT: 64 IN | BODY MASS INDEX: 32.61 KG/M2 | TEMPERATURE: 98.1 F | RESPIRATION RATE: 20 BRPM | DIASTOLIC BLOOD PRESSURE: 80 MMHG | WEIGHT: 191 LBS | SYSTOLIC BLOOD PRESSURE: 110 MMHG | HEART RATE: 110 BPM

## 2025-04-29 DIAGNOSIS — Z17.421 TRIPLE NEGATIVE BREAST CANCER (HCC): Primary | ICD-10-CM

## 2025-04-29 DIAGNOSIS — M79.89 SWELLING OF LOWER LEG: ICD-10-CM

## 2025-04-29 DIAGNOSIS — C50.919 TRIPLE NEGATIVE BREAST CANCER (HCC): Primary | ICD-10-CM

## 2025-04-29 PROCEDURE — 99214 OFFICE O/P EST MOD 30 MIN: CPT | Performed by: SURGERY

## 2025-04-29 PROCEDURE — 1159F MED LIST DOCD IN RCRD: CPT | Performed by: SURGERY

## 2025-04-29 PROCEDURE — 1123F ACP DISCUSS/DSCN MKR DOCD: CPT | Performed by: SURGERY

## 2025-04-29 PROCEDURE — 3074F SYST BP LT 130 MM HG: CPT | Performed by: SURGERY

## 2025-04-29 PROCEDURE — 4004F PT TOBACCO SCREEN RCVD TLK: CPT | Performed by: SURGERY

## 2025-04-29 PROCEDURE — 3017F COLORECTAL CA SCREEN DOC REV: CPT | Performed by: SURGERY

## 2025-04-29 PROCEDURE — 1090F PRES/ABSN URINE INCON ASSESS: CPT | Performed by: SURGERY

## 2025-04-29 PROCEDURE — G8417 CALC BMI ABV UP PARAM F/U: HCPCS | Performed by: SURGERY

## 2025-04-29 PROCEDURE — 3079F DIAST BP 80-89 MM HG: CPT | Performed by: SURGERY

## 2025-04-29 PROCEDURE — G8427 DOCREV CUR MEDS BY ELIG CLIN: HCPCS | Performed by: SURGERY

## 2025-04-29 PROCEDURE — G8400 PT W/DXA NO RESULTS DOC: HCPCS | Performed by: SURGERY

## 2025-04-29 PROCEDURE — 99213 OFFICE O/P EST LOW 20 MIN: CPT | Performed by: SURGERY

## 2025-04-29 RX ORDER — PEMBROLIZUMAB 25 MG/ML
8 INJECTION, SOLUTION INTRAVENOUS
Status: ON HOLD | COMMUNITY
Start: 2025-04-16

## 2025-04-29 NOTE — PROGRESS NOTES
Hypothyroidism     Nephrolithiasis 2023    Obesity (BMI 35.0-39.9 without comorbidity) 2016    Osteoarthritis     Sleep apnea     Stage 1 mild COPD by GOLD classification (HCC) 2018    Thyroid disease     hypothyroidism    Uncontrolled daytime somnolence      Past Surgical History:   Procedure Laterality Date    ABDOMINAL ADHESION SURGERY      and endometrioma removal    BLADDER SURGERY Left 2023    CYSTOSCOPY RETROGRADE PYELOGRAM LEFT STENT INSERTION performed by Barber Whittington MD at [facility] OR    BREAST SURGERY      CARPAL TUNNEL RELEASE Right     Right CTR  Dr. Chirag Weston    CARPAL TUNNEL RELEASE Left     Left CTR Dr. Pereira     SECTION      x 2    FINGER TRIGGER RELEASE Right     Right trigger thumb release Dr. Chirag Weston    FINGER TRIGGER RELEASE Left     Left trigger thumb release Dr. Pereira    HYSTERECTOMY (CERVIX STATUS UNKNOWN)      and BSO    YENY STEREO BREAST BX W LOC DEVICE 1ST LESION RIGHT Right 2023    YENY STEROTACTIC LOC BREAST BIOPSY RIGHT 2023 [facility]    OVARIAN CYST REMOVAL Left     PORT SURGERY Right 9/10/2024    PORT INSERTION performed by Katelynn Gabriel MD at [facility] OR     BREAST BIOPSY W LOC DEVICE 1ST LESION RIGHT Right 2024    US BREAST BIOPSY W LOC DEVICE 1ST LESION RIGHT 2024 [facility]    UTERINE FIBROID SURGERY         MEDICATIONS    Current Outpatient Medications:     KEYTRUDA 100 MG/4ML SOLN, Inject 8 mLs as directed every 21 days, Disp: , Rfl:     pravastatin (PRAVACHOL) 40 MG tablet, 1 po qd q hs, Disp: 90 tablet, Rfl: 1    metoprolol tartrate (LOPRESSOR) 50 MG tablet, Take 1 tablet by mouth 2 times daily, Disp: 180 tablet, Rfl: 1    levothyroxine (EUTHYROX) 75 MCG tablet, Take 1 tablet by mouth once daily, Disp: 90 tablet, Rfl: 1    fenofibrate 160 MG tablet, 1 po qd, Disp: 90 tablet, Rfl: 1    DULoxetine (CYMBALTA) 60 MG extended release capsule, Take 1 tablet daily, Disp: 90 capsule, Rfl: 1    amLODIPine

## 2025-04-29 NOTE — PATIENT INSTRUCTIONS
PCP CLEARANCE FOR SURGERY    CAN GET CXR    ULTRASOUND LOWER EXTREMITIES WILL BE SCHEDULED    SURGERY WILL BE SCHEDULED ONCE YOU HAVE THE CLEARANCE FROM YOUR DOCTOR.  CALL OUR OFFICE ONCE YOU SEE YOUR PCP

## 2025-05-01 ENCOUNTER — TELEPHONE (OUTPATIENT)
Dept: PRIMARY CARE CLINIC | Age: 65
End: 2025-05-01

## 2025-05-01 ENCOUNTER — APPOINTMENT (OUTPATIENT)
Dept: CT IMAGING | Age: 65
End: 2025-05-01
Payer: MEDICARE

## 2025-05-01 ENCOUNTER — TELEPHONE (OUTPATIENT)
Dept: BREAST CENTER | Age: 65
End: 2025-05-01

## 2025-05-01 ENCOUNTER — HOSPITAL ENCOUNTER (INPATIENT)
Age: 65
LOS: 6 days | Discharge: ANOTHER HOSPITAL | DRG: 308 | End: 2025-05-07
Attending: STUDENT IN AN ORGANIZED HEALTH CARE EDUCATION/TRAINING PROGRAM | Admitting: STUDENT IN AN ORGANIZED HEALTH CARE EDUCATION/TRAINING PROGRAM
Payer: MEDICARE

## 2025-05-01 ENCOUNTER — HOSPITAL ENCOUNTER (EMERGENCY)
Age: 65
Discharge: ANOTHER HOSPITAL | End: 2025-05-01
Attending: EMERGENCY MEDICINE
Payer: MEDICARE

## 2025-05-01 ENCOUNTER — APPOINTMENT (OUTPATIENT)
Dept: GENERAL RADIOLOGY | Age: 65
End: 2025-05-01
Payer: MEDICARE

## 2025-05-01 VITALS
DIASTOLIC BLOOD PRESSURE: 78 MMHG | TEMPERATURE: 98.3 F | OXYGEN SATURATION: 94 % | SYSTOLIC BLOOD PRESSURE: 114 MMHG | RESPIRATION RATE: 18 BRPM | HEART RATE: 86 BPM

## 2025-05-01 DIAGNOSIS — Z85.3 HISTORY OF BREAST CANCER: ICD-10-CM

## 2025-05-01 DIAGNOSIS — I48.0 PAROXYSMAL ATRIAL FIBRILLATION (HCC): ICD-10-CM

## 2025-05-01 DIAGNOSIS — I26.99 ACUTE PULMONARY EMBOLISM WITHOUT ACUTE COR PULMONALE, UNSPECIFIED PULMONARY EMBOLISM TYPE (HCC): ICD-10-CM

## 2025-05-01 DIAGNOSIS — I48.91 NEW ONSET ATRIAL FIBRILLATION (HCC): Primary | ICD-10-CM

## 2025-05-01 DIAGNOSIS — I48.91 ATRIAL FIBRILLATION, UNSPECIFIED TYPE (HCC): Primary | ICD-10-CM

## 2025-05-01 LAB
ALBUMIN SERPL-MCNC: 3.8 G/DL (ref 3.5–5.2)
ALP SERPL-CCNC: 56 U/L (ref 35–104)
ALT SERPL-CCNC: 20 U/L (ref 0–32)
ANION GAP SERPL CALCULATED.3IONS-SCNC: 10 MMOL/L (ref 7–16)
AST SERPL-CCNC: 23 U/L (ref 0–31)
BASOPHILS # BLD: 0.03 K/UL (ref 0–0.2)
BASOPHILS NFR BLD: 1 % (ref 0–2)
BILIRUB SERPL-MCNC: 0.5 MG/DL (ref 0–1.2)
BNP SERPL-MCNC: 4625 PG/ML (ref 0–125)
BUN SERPL-MCNC: 9 MG/DL (ref 6–23)
CALCIUM SERPL-MCNC: 9.4 MG/DL (ref 8.6–10.2)
CHLORIDE SERPL-SCNC: 105 MMOL/L (ref 98–107)
CO2 SERPL-SCNC: 26 MMOL/L (ref 22–29)
CREAT SERPL-MCNC: 0.7 MG/DL (ref 0.5–1)
D-DIMER QUANTITATIVE: 260 NG/ML DDU (ref 0–230)
EOSINOPHIL # BLD: 0.05 K/UL (ref 0.05–0.5)
EOSINOPHILS RELATIVE PERCENT: 1 % (ref 0–6)
ERYTHROCYTE [DISTWIDTH] IN BLOOD BY AUTOMATED COUNT: 13.2 % (ref 11.5–15)
ERYTHROCYTE [DISTWIDTH] IN BLOOD BY AUTOMATED COUNT: 13.2 % (ref 11.5–15)
GFR, ESTIMATED: >90 ML/MIN/1.73M2
GLUCOSE SERPL-MCNC: 97 MG/DL (ref 74–99)
HCT VFR BLD AUTO: 34.6 % (ref 34–48)
HCT VFR BLD AUTO: 37.8 % (ref 34–48)
HGB BLD-MCNC: 11.2 G/DL (ref 11.5–15.5)
HGB BLD-MCNC: 12.1 G/DL (ref 11.5–15.5)
IMM GRANULOCYTES # BLD AUTO: <0.03 K/UL (ref 0–0.58)
IMM GRANULOCYTES NFR BLD: 0 % (ref 0–5)
LYMPHOCYTES NFR BLD: 1.23 K/UL (ref 1.5–4)
LYMPHOCYTES RELATIVE PERCENT: 33 % (ref 20–42)
MAGNESIUM SERPL-MCNC: 1.7 MG/DL (ref 1.6–2.6)
MCH RBC QN AUTO: 29.4 PG (ref 26–35)
MCH RBC QN AUTO: 29.6 PG (ref 26–35)
MCHC RBC AUTO-ENTMCNC: 32 G/DL (ref 32–34.5)
MCHC RBC AUTO-ENTMCNC: 32.4 G/DL (ref 32–34.5)
MCV RBC AUTO: 91.3 FL (ref 80–99.9)
MCV RBC AUTO: 92 FL (ref 80–99.9)
MONOCYTES NFR BLD: 0.36 K/UL (ref 0.1–0.95)
MONOCYTES NFR BLD: 10 % (ref 2–12)
NEUTROPHILS NFR BLD: 56 % (ref 43–80)
NEUTS SEG NFR BLD: 2.08 K/UL (ref 1.8–7.3)
PARTIAL THROMBOPLASTIN TIME: 35.1 SEC (ref 24.5–35.1)
PLATELET # BLD AUTO: 181 K/UL (ref 130–450)
PLATELET # BLD AUTO: 39 K/UL (ref 130–450)
PLATELET CONFIRMATION: NORMAL
PMV BLD AUTO: 12.7 FL (ref 7–12)
PMV BLD AUTO: ABNORMAL FL (ref 7–12)
POTASSIUM SERPL-SCNC: 3.5 MMOL/L (ref 3.5–5)
PROT SERPL-MCNC: 6.6 G/DL (ref 6.4–8.3)
RBC # BLD AUTO: 3.79 M/UL (ref 3.5–5.5)
RBC # BLD AUTO: 4.11 M/UL (ref 3.5–5.5)
SODIUM SERPL-SCNC: 141 MMOL/L (ref 132–146)
TROPONIN I SERPL HS-MCNC: 19 NG/L (ref 0–14)
TROPONIN I SERPL HS-MCNC: 19 NG/L (ref 0–14)
WBC OTHER # BLD: 3.8 K/UL (ref 4.5–11.5)
WBC OTHER # BLD: 5.1 K/UL (ref 4.5–11.5)

## 2025-05-01 PROCEDURE — 85027 COMPLETE CBC AUTOMATED: CPT

## 2025-05-01 PROCEDURE — B24BZZ4 ULTRASONOGRAPHY OF HEART WITH AORTA, TRANSESOPHAGEAL: ICD-10-PCS | Performed by: INTERNAL MEDICINE

## 2025-05-01 PROCEDURE — 83880 ASSAY OF NATRIURETIC PEPTIDE: CPT

## 2025-05-01 PROCEDURE — 71046 X-RAY EXAM CHEST 2 VIEWS: CPT

## 2025-05-01 PROCEDURE — 84484 ASSAY OF TROPONIN QUANT: CPT

## 2025-05-01 PROCEDURE — 96374 THER/PROPH/DIAG INJ IV PUSH: CPT

## 2025-05-01 PROCEDURE — 99285 EMERGENCY DEPT VISIT HI MDM: CPT

## 2025-05-01 PROCEDURE — APPSS45 APP SPLIT SHARED TIME 31-45 MINUTES

## 2025-05-01 PROCEDURE — 6360000002 HC RX W HCPCS: Performed by: PHYSICIAN ASSISTANT

## 2025-05-01 PROCEDURE — 6370000000 HC RX 637 (ALT 250 FOR IP): Performed by: EMERGENCY MEDICINE

## 2025-05-01 PROCEDURE — 6360000004 HC RX CONTRAST MEDICATION: Performed by: RADIOLOGY

## 2025-05-01 PROCEDURE — 85379 FIBRIN DEGRADATION QUANT: CPT

## 2025-05-01 PROCEDURE — 80053 COMPREHEN METABOLIC PANEL: CPT

## 2025-05-01 PROCEDURE — 85025 COMPLETE CBC W/AUTO DIFF WBC: CPT

## 2025-05-01 PROCEDURE — 5A2204Z RESTORATION OF CARDIAC RHYTHM, SINGLE: ICD-10-PCS | Performed by: INTERNAL MEDICINE

## 2025-05-01 PROCEDURE — 85730 THROMBOPLASTIN TIME PARTIAL: CPT

## 2025-05-01 PROCEDURE — 71275 CT ANGIOGRAPHY CHEST: CPT

## 2025-05-01 PROCEDURE — 99223 1ST HOSP IP/OBS HIGH 75: CPT | Performed by: STUDENT IN AN ORGANIZED HEALTH CARE EDUCATION/TRAINING PROGRAM

## 2025-05-01 PROCEDURE — 83735 ASSAY OF MAGNESIUM: CPT

## 2025-05-01 PROCEDURE — 2060000000 HC ICU INTERMEDIATE R&B

## 2025-05-01 PROCEDURE — 93005 ELECTROCARDIOGRAM TRACING: CPT | Performed by: PHYSICIAN ASSISTANT

## 2025-05-01 RX ORDER — IOPAMIDOL 755 MG/ML
75 INJECTION, SOLUTION INTRAVASCULAR
Status: COMPLETED | OUTPATIENT
Start: 2025-05-01 | End: 2025-05-01

## 2025-05-01 RX ORDER — POTASSIUM CHLORIDE 7.45 MG/ML
10 INJECTION INTRAVENOUS PRN
Status: DISCONTINUED | OUTPATIENT
Start: 2025-05-01 | End: 2025-05-07 | Stop reason: HOSPADM

## 2025-05-01 RX ORDER — DOCUSATE SODIUM 100 MG/1
100 CAPSULE, LIQUID FILLED ORAL DAILY
Status: ON HOLD | COMMUNITY

## 2025-05-01 RX ORDER — SODIUM CHLORIDE 0.9 % (FLUSH) 0.9 %
5-40 SYRINGE (ML) INJECTION EVERY 12 HOURS SCHEDULED
Status: DISCONTINUED | OUTPATIENT
Start: 2025-05-02 | End: 2025-05-07 | Stop reason: HOSPADM

## 2025-05-01 RX ORDER — POLYETHYLENE GLYCOL 3350 17 G/17G
17 POWDER, FOR SOLUTION ORAL DAILY PRN
Status: DISCONTINUED | OUTPATIENT
Start: 2025-05-01 | End: 2025-05-07 | Stop reason: HOSPADM

## 2025-05-01 RX ORDER — METOPROLOL TARTRATE 50 MG
50 TABLET ORAL 2 TIMES DAILY
Status: DISCONTINUED | OUTPATIENT
Start: 2025-05-02 | End: 2025-05-02

## 2025-05-01 RX ORDER — HEPARIN SODIUM 1000 [USP'U]/ML
4000 INJECTION, SOLUTION INTRAVENOUS; SUBCUTANEOUS ONCE
Status: DISCONTINUED | OUTPATIENT
Start: 2025-05-02 | End: 2025-05-07 | Stop reason: HOSPADM

## 2025-05-01 RX ORDER — HEPARIN SODIUM 1000 [USP'U]/ML
80 INJECTION, SOLUTION INTRAVENOUS; SUBCUTANEOUS PRN
Status: DISCONTINUED | OUTPATIENT
Start: 2025-05-01 | End: 2025-05-01 | Stop reason: HOSPADM

## 2025-05-01 RX ORDER — POTASSIUM CHLORIDE 1500 MG/1
40 TABLET, EXTENDED RELEASE ORAL PRN
Status: DISCONTINUED | OUTPATIENT
Start: 2025-05-01 | End: 2025-05-07 | Stop reason: HOSPADM

## 2025-05-01 RX ORDER — HEPARIN SODIUM 1000 [USP'U]/ML
40 INJECTION, SOLUTION INTRAVENOUS; SUBCUTANEOUS PRN
Status: DISCONTINUED | OUTPATIENT
Start: 2025-05-01 | End: 2025-05-01 | Stop reason: HOSPADM

## 2025-05-01 RX ORDER — HEPARIN SODIUM 1000 [USP'U]/ML
80 INJECTION, SOLUTION INTRAVENOUS; SUBCUTANEOUS ONCE
Status: COMPLETED | OUTPATIENT
Start: 2025-05-01 | End: 2025-05-01

## 2025-05-01 RX ORDER — SODIUM CHLORIDE 9 MG/ML
INJECTION, SOLUTION INTRAVENOUS PRN
Status: DISCONTINUED | OUTPATIENT
Start: 2025-05-01 | End: 2025-05-07 | Stop reason: HOSPADM

## 2025-05-01 RX ORDER — METOPROLOL TARTRATE 50 MG
25 TABLET ORAL ONCE
Status: COMPLETED | OUTPATIENT
Start: 2025-05-01 | End: 2025-05-01

## 2025-05-01 RX ORDER — MAGNESIUM SULFATE IN WATER 40 MG/ML
2000 INJECTION, SOLUTION INTRAVENOUS PRN
Status: DISCONTINUED | OUTPATIENT
Start: 2025-05-01 | End: 2025-05-07 | Stop reason: HOSPADM

## 2025-05-01 RX ORDER — HEPARIN SODIUM 10000 [USP'U]/100ML
5-30 INJECTION, SOLUTION INTRAVENOUS CONTINUOUS
Status: DISCONTINUED | OUTPATIENT
Start: 2025-05-01 | End: 2025-05-01 | Stop reason: HOSPADM

## 2025-05-01 RX ORDER — HEPARIN SODIUM 1000 [USP'U]/ML
2000 INJECTION, SOLUTION INTRAVENOUS; SUBCUTANEOUS PRN
Status: DISCONTINUED | OUTPATIENT
Start: 2025-05-01 | End: 2025-05-07 | Stop reason: HOSPADM

## 2025-05-01 RX ORDER — ACETAMINOPHEN 650 MG/1
650 SUPPOSITORY RECTAL EVERY 6 HOURS PRN
Status: DISCONTINUED | OUTPATIENT
Start: 2025-05-01 | End: 2025-05-07 | Stop reason: HOSPADM

## 2025-05-01 RX ORDER — SODIUM CHLORIDE 0.9 % (FLUSH) 0.9 %
5-40 SYRINGE (ML) INJECTION PRN
Status: DISCONTINUED | OUTPATIENT
Start: 2025-05-01 | End: 2025-05-07 | Stop reason: HOSPADM

## 2025-05-01 RX ORDER — HEPARIN SODIUM 10000 [USP'U]/100ML
5-30 INJECTION, SOLUTION INTRAVENOUS CONTINUOUS
Status: DISCONTINUED | OUTPATIENT
Start: 2025-05-02 | End: 2025-05-07 | Stop reason: HOSPADM

## 2025-05-01 RX ORDER — ACETAMINOPHEN 325 MG/1
650 TABLET ORAL EVERY 6 HOURS PRN
Status: DISCONTINUED | OUTPATIENT
Start: 2025-05-01 | End: 2025-05-07 | Stop reason: HOSPADM

## 2025-05-01 RX ORDER — HEPARIN SODIUM 1000 [USP'U]/ML
4000 INJECTION, SOLUTION INTRAVENOUS; SUBCUTANEOUS PRN
Status: DISCONTINUED | OUTPATIENT
Start: 2025-05-01 | End: 2025-05-07 | Stop reason: HOSPADM

## 2025-05-01 RX ADMIN — HEPARIN SODIUM 18 UNITS/KG/HR: 10000 INJECTION, SOLUTION INTRAVENOUS at 18:55

## 2025-05-01 RX ADMIN — METOPROLOL TARTRATE 25 MG: 50 TABLET, FILM COATED ORAL at 17:56

## 2025-05-01 RX ADMIN — HEPARIN SODIUM 6900 UNITS: 1000 INJECTION INTRAVENOUS; SUBCUTANEOUS at 18:53

## 2025-05-01 RX ADMIN — IOPAMIDOL 75 ML: 755 INJECTION, SOLUTION INTRAVENOUS at 17:32

## 2025-05-01 ASSESSMENT — PAIN SCALES - GENERAL
PAINLEVEL_OUTOF10: 0
PAINLEVEL_OUTOF10: 0

## 2025-05-01 ASSESSMENT — LIFESTYLE VARIABLES
HOW OFTEN DO YOU HAVE A DRINK CONTAINING ALCOHOL: NEVER
HOW MANY STANDARD DRINKS CONTAINING ALCOHOL DO YOU HAVE ON A TYPICAL DAY: PATIENT DOES NOT DRINK

## 2025-05-01 ASSESSMENT — PAIN - FUNCTIONAL ASSESSMENT
PAIN_FUNCTIONAL_ASSESSMENT: NONE - DENIES PAIN
PAIN_FUNCTIONAL_ASSESSMENT: 0-10
PAIN_FUNCTIONAL_ASSESSMENT: NONE - DENIES PAIN

## 2025-05-01 NOTE — TELEPHONE ENCOUNTER
Called and left message to see how patient is feeling and to make appointment for medical clearance. Also saw where she was in the ER.

## 2025-05-01 NOTE — TELEPHONE ENCOUNTER
Left message on patient voice mail :    Patient is scheduled for Vascular duplex lower extremity venous left 5/5/25 arrival 1:30pm / Test 2:00pm Lynne Shelton   Waiting for a return call to confirm appointment

## 2025-05-01 NOTE — ED PROVIDER NOTES
and History of breast cancer were also pertinent to this visit.  Discharge Medication List as of 5/1/2025 10:14 PM        DO Lg Jj Megan A, DO  05/03/25 2207

## 2025-05-02 ENCOUNTER — APPOINTMENT (OUTPATIENT)
Age: 65
DRG: 308 | End: 2025-05-02
Attending: STUDENT IN AN ORGANIZED HEALTH CARE EDUCATION/TRAINING PROGRAM
Payer: MEDICARE

## 2025-05-02 PROBLEM — C50.911 CARCINOMA OF RIGHT FEMALE BREAST (HCC): Status: ACTIVE | Noted: 2025-05-02

## 2025-05-02 PROBLEM — I26.93 SINGLE SUBSEGMENTAL PULMONARY EMBOLISM WITHOUT ACUTE COR PULMONALE (HCC): Status: ACTIVE | Noted: 2025-05-02

## 2025-05-02 PROBLEM — I26.99 ACUTE PULMONARY EMBOLISM WITHOUT ACUTE COR PULMONALE (HCC): Status: ACTIVE | Noted: 2025-05-02

## 2025-05-02 PROBLEM — E78.00 PURE HYPERCHOLESTEROLEMIA: Status: ACTIVE | Noted: 2025-05-02

## 2025-05-02 PROBLEM — I31.39 PERICARDIAL EFFUSION: Status: ACTIVE | Noted: 2025-05-02

## 2025-05-02 LAB
ALBUMIN SERPL-MCNC: 3.7 G/DL (ref 3.5–5.2)
ALP SERPL-CCNC: 58 U/L (ref 35–104)
ALT SERPL-CCNC: 22 U/L (ref 0–32)
ANION GAP SERPL CALCULATED.3IONS-SCNC: 12 MMOL/L (ref 7–16)
AST SERPL-CCNC: 25 U/L (ref 0–31)
BILIRUB SERPL-MCNC: 0.5 MG/DL (ref 0–1.2)
BUN SERPL-MCNC: 8 MG/DL (ref 6–23)
CALCIUM SERPL-MCNC: 8.8 MG/DL (ref 8.6–10.2)
CHLORIDE SERPL-SCNC: 104 MMOL/L (ref 98–107)
CO2 SERPL-SCNC: 24 MMOL/L (ref 22–29)
CREAT SERPL-MCNC: 0.5 MG/DL (ref 0.5–1)
ECHO AO ANNULUS INDEX: 1.53 CM/M2
ECHO AO ASC DIAM: 3 CM
ECHO AO ASCENDING AORTA INDEX: 1.58 CM/M2
ECHO AV ANNULUS DIAM: 2.9 CM
ECHO AV AREA PEAK VELOCITY: 2.4 CM2
ECHO AV AREA VTI: 2.2 CM2
ECHO AV AREA/BSA PEAK VELOCITY: 1.3 CM2/M2
ECHO AV AREA/BSA VTI: 1.2 CM2/M2
ECHO AV CUSP MM: 1.8 CM
ECHO AV MEAN GRADIENT: 4 MMHG
ECHO AV MEAN VELOCITY: 0.9 M/S
ECHO AV PEAK GRADIENT: 6 MMHG
ECHO AV PEAK VELOCITY: 1.2 M/S
ECHO AV VELOCITY RATIO: 0.75
ECHO AV VTI: 21.3 CM
ECHO BSA: 1.97 M2
ECHO EST RA PRESSURE: 15 MMHG
ECHO LA DIAMETER INDEX: 2.89 CM/M2
ECHO LA DIAMETER: 5.5 CM
ECHO LA VOL A-L A2C: 86 ML (ref 22–52)
ECHO LA VOL A-L A4C: 71 ML (ref 22–52)
ECHO LA VOL MOD A2C: 83 ML (ref 22–52)
ECHO LA VOL MOD A4C: 67 ML (ref 22–52)
ECHO LA VOLUME AREA LENGTH: 79 ML
ECHO LA VOLUME INDEX A-L A2C: 45 ML/M2 (ref 16–34)
ECHO LA VOLUME INDEX A-L A4C: 37 ML/M2 (ref 16–34)
ECHO LA VOLUME INDEX AREA LENGTH: 42 ML/M2 (ref 16–34)
ECHO LA VOLUME INDEX MOD A2C: 44 ML/M2 (ref 16–34)
ECHO LA VOLUME INDEX MOD A4C: 35 ML/M2 (ref 16–34)
ECHO LV EDV A2C: 83 ML
ECHO LV EDV A4C: 100 ML
ECHO LV EDV BP: 95 ML (ref 56–104)
ECHO LV EDV INDEX A4C: 53 ML/M2
ECHO LV EDV INDEX BP: 50 ML/M2
ECHO LV EDV NDEX A2C: 44 ML/M2
ECHO LV EF PHYSICIAN: 40 %
ECHO LV EJECTION FRACTION A2C: 39 %
ECHO LV EJECTION FRACTION A4C: 35 %
ECHO LV EJECTION FRACTION BIPLANE: 40 % (ref 55–100)
ECHO LV ESV A2C: 51 ML
ECHO LV ESV A4C: 65 ML
ECHO LV ESV BP: 58 ML (ref 19–49)
ECHO LV ESV INDEX A2C: 27 ML/M2
ECHO LV ESV INDEX A4C: 34 ML/M2
ECHO LV ESV INDEX BP: 31 ML/M2
ECHO LV FRACTIONAL SHORTENING: 15 % (ref 28–44)
ECHO LV INTERNAL DIMENSION DIASTOLE INDEX: 2.53 CM/M2
ECHO LV INTERNAL DIMENSION DIASTOLIC: 4.8 CM (ref 3.9–5.3)
ECHO LV INTERNAL DIMENSION SYSTOLIC INDEX: 2.16 CM/M2
ECHO LV INTERNAL DIMENSION SYSTOLIC: 4.1 CM
ECHO LV IVSD: 0.8 CM (ref 0.6–0.9)
ECHO LV IVSS: 1 CM
ECHO LV MASS 2D: 126.7 G (ref 67–162)
ECHO LV MASS INDEX 2D: 66.7 G/M2 (ref 43–95)
ECHO LV POSTERIOR WALL DIASTOLIC: 0.8 CM (ref 0.6–0.9)
ECHO LV POSTERIOR WALL SYSTOLIC: 1.4 CM
ECHO LV RELATIVE WALL THICKNESS RATIO: 0.33
ECHO LVOT AREA: 3.5 CM2
ECHO LVOT AV VTI INDEX: 0.67
ECHO LVOT DIAM: 2.1 CM
ECHO LVOT MEAN GRADIENT: 1 MMHG
ECHO LVOT PEAK GRADIENT: 3 MMHG
ECHO LVOT PEAK VELOCITY: 0.9 M/S
ECHO LVOT STROKE VOLUME INDEX: 25.9 ML/M2
ECHO LVOT SV: 49.2 ML
ECHO LVOT VTI: 14.2 CM
ECHO MV AREA PHT: 3.3 CM2
ECHO MV AREA VTI: 1.5 CM2
ECHO MV EROA PISA: 0.4 CM2
ECHO MV LVOT VTI INDEX: 2.26
ECHO MV MAX VELOCITY: 1.3 M/S
ECHO MV MEAN GRADIENT: 2 MMHG
ECHO MV MEAN VELOCITY: 0.6 M/S
ECHO MV PEAK GRADIENT: 6 MMHG
ECHO MV PRESSURE HALF TIME (PHT): 67.1 MS
ECHO MV REGURGITANT ALIASING (NYQUIST) VELOCITY: 34 CM/S
ECHO MV REGURGITANT RADIUS PISA: 0.87 CM
ECHO MV REGURGITANT VELOCITY PISA: 3.9 M/S
ECHO MV REGURGITANT VOLUME PISA: 49.69 ML
ECHO MV REGURGITANT VTIA: 119.9 CM
ECHO MV VTI: 32.1 CM
ECHO PV MAX VELOCITY: 0.9 M/S
ECHO PV MEAN GRADIENT: 2 MMHG
ECHO PV MEAN VELOCITY: 0.6 M/S
ECHO PV PEAK GRADIENT: 3 MMHG
ECHO PV VTI: 13.5 CM
ECHO RIGHT VENTRICULAR SYSTOLIC PRESSURE (RVSP): 36 MMHG
ECHO RV BASAL DIMENSION: 4.8 CM
ECHO RV INTERNAL DIMENSION: 2.8 CM
ECHO RV LONGITUDINAL DIMENSION: 7 CM
ECHO RV MID DIMENSION: 3.4 CM
ECHO RV TAPSE: 1.8 CM (ref 1.7–?)
ECHO TV REGURGITANT MAX VELOCITY: 2.3 M/S
ECHO TV REGURGITANT PEAK GRADIENT: 21 MMHG
EKG ATRIAL RATE: 113 BPM
EKG ATRIAL RATE: 133 BPM
EKG Q-T INTERVAL: 278 MS
EKG Q-T INTERVAL: 284 MS
EKG QRS DURATION: 74 MS
EKG QRS DURATION: 90 MS
EKG QTC CALCULATION (BAZETT): 382 MS
EKG QTC CALCULATION (BAZETT): 387 MS
EKG R AXIS: -65 DEGREES
EKG R AXIS: -66 DEGREES
EKG T AXIS: -19 DEGREES
EKG T AXIS: -19 DEGREES
EKG VENTRICULAR RATE: 109 BPM
EKG VENTRICULAR RATE: 117 BPM
ERYTHROCYTE [DISTWIDTH] IN BLOOD BY AUTOMATED COUNT: 13.3 % (ref 11.5–15)
GFR, ESTIMATED: >90 ML/MIN/1.73M2
GLUCOSE SERPL-MCNC: 121 MG/DL (ref 74–99)
HCT VFR BLD AUTO: 35.1 % (ref 34–48)
HGB BLD-MCNC: 11.2 G/DL (ref 11.5–15.5)
INR PPP: 1.4
MCH RBC QN AUTO: 30 PG (ref 26–35)
MCHC RBC AUTO-ENTMCNC: 31.9 G/DL (ref 32–34.5)
MCV RBC AUTO: 94.1 FL (ref 80–99.9)
PARTIAL THROMBOPLASTIN TIME: 220.4 SEC (ref 24.5–35.1)
PARTIAL THROMBOPLASTIN TIME: 41.1 SEC (ref 24.5–35.1)
PARTIAL THROMBOPLASTIN TIME: 49.8 SEC (ref 24.5–35.1)
PARTIAL THROMBOPLASTIN TIME: >240 SEC (ref 24.5–35.1)
PLATELET # BLD AUTO: 142 K/UL (ref 130–450)
PMV BLD AUTO: 12.3 FL (ref 7–12)
POTASSIUM SERPL-SCNC: 3.1 MMOL/L (ref 3.5–5)
PROT SERPL-MCNC: 6.2 G/DL (ref 6.4–8.3)
PROTHROMBIN TIME: 14.5 SEC (ref 9.3–12.4)
RBC # BLD AUTO: 3.73 M/UL (ref 3.5–5.5)
SODIUM SERPL-SCNC: 140 MMOL/L (ref 132–146)
TSH SERPL DL<=0.05 MIU/L-ACNC: 1.29 UIU/ML (ref 0.27–4.2)
WBC OTHER # BLD: 4.5 K/UL (ref 4.5–11.5)

## 2025-05-02 PROCEDURE — 6370000000 HC RX 637 (ALT 250 FOR IP): Performed by: INTERNAL MEDICINE

## 2025-05-02 PROCEDURE — 93005 ELECTROCARDIOGRAM TRACING: CPT | Performed by: INTERNAL MEDICINE

## 2025-05-02 PROCEDURE — 36415 COLL VENOUS BLD VENIPUNCTURE: CPT

## 2025-05-02 PROCEDURE — 99223 1ST HOSP IP/OBS HIGH 75: CPT | Performed by: INTERNAL MEDICINE

## 2025-05-02 PROCEDURE — 80053 COMPREHEN METABOLIC PANEL: CPT

## 2025-05-02 PROCEDURE — 84443 ASSAY THYROID STIM HORMONE: CPT

## 2025-05-02 PROCEDURE — 99232 SBSQ HOSP IP/OBS MODERATE 35: CPT | Performed by: STUDENT IN AN ORGANIZED HEALTH CARE EDUCATION/TRAINING PROGRAM

## 2025-05-02 PROCEDURE — 85027 COMPLETE CBC AUTOMATED: CPT

## 2025-05-02 PROCEDURE — 2060000000 HC ICU INTERMEDIATE R&B

## 2025-05-02 PROCEDURE — 2500000003 HC RX 250 WO HCPCS

## 2025-05-02 PROCEDURE — 6370000000 HC RX 637 (ALT 250 FOR IP)

## 2025-05-02 PROCEDURE — 6370000000 HC RX 637 (ALT 250 FOR IP): Performed by: STUDENT IN AN ORGANIZED HEALTH CARE EDUCATION/TRAINING PROGRAM

## 2025-05-02 PROCEDURE — 85730 THROMBOPLASTIN TIME PARTIAL: CPT

## 2025-05-02 PROCEDURE — 93306 TTE W/DOPPLER COMPLETE: CPT

## 2025-05-02 PROCEDURE — 93306 TTE W/DOPPLER COMPLETE: CPT | Performed by: INTERNAL MEDICINE

## 2025-05-02 PROCEDURE — 85610 PROTHROMBIN TIME: CPT

## 2025-05-02 PROCEDURE — 2700000000 HC OXYGEN THERAPY PER DAY

## 2025-05-02 PROCEDURE — 6360000002 HC RX W HCPCS

## 2025-05-02 PROCEDURE — 93010 ELECTROCARDIOGRAM REPORT: CPT | Performed by: INTERNAL MEDICINE

## 2025-05-02 RX ORDER — ALLOPURINOL 300 MG/1
300 TABLET ORAL NIGHTLY
Status: DISCONTINUED | OUTPATIENT
Start: 2025-05-02 | End: 2025-05-07 | Stop reason: HOSPADM

## 2025-05-02 RX ORDER — PRAVASTATIN SODIUM 20 MG
40 TABLET ORAL NIGHTLY
Status: DISCONTINUED | OUTPATIENT
Start: 2025-05-02 | End: 2025-05-07 | Stop reason: HOSPADM

## 2025-05-02 RX ORDER — FENOFIBRATE 160 MG/1
160 TABLET ORAL DAILY
Status: DISCONTINUED | OUTPATIENT
Start: 2025-05-02 | End: 2025-05-07 | Stop reason: HOSPADM

## 2025-05-02 RX ORDER — LEVOTHYROXINE SODIUM 75 UG/1
75 TABLET ORAL DAILY
Status: DISCONTINUED | OUTPATIENT
Start: 2025-05-02 | End: 2025-05-07 | Stop reason: HOSPADM

## 2025-05-02 RX ORDER — AMLODIPINE BESYLATE 5 MG/1
5 TABLET ORAL DAILY
Status: DISCONTINUED | OUTPATIENT
Start: 2025-05-02 | End: 2025-05-02

## 2025-05-02 RX ORDER — PANTOPRAZOLE SODIUM 40 MG/1
40 TABLET, DELAYED RELEASE ORAL
Status: DISCONTINUED | OUTPATIENT
Start: 2025-05-02 | End: 2025-05-03

## 2025-05-02 RX ORDER — DOCUSATE SODIUM 100 MG/1
100 CAPSULE, LIQUID FILLED ORAL DAILY
Status: DISCONTINUED | OUTPATIENT
Start: 2025-05-02 | End: 2025-05-07 | Stop reason: HOSPADM

## 2025-05-02 RX ORDER — METOPROLOL SUCCINATE 50 MG/1
50 TABLET, EXTENDED RELEASE ORAL 2 TIMES DAILY
Status: DISCONTINUED | OUTPATIENT
Start: 2025-05-02 | End: 2025-05-07 | Stop reason: HOSPADM

## 2025-05-02 RX ORDER — DULOXETIN HYDROCHLORIDE 60 MG/1
60 CAPSULE, DELAYED RELEASE ORAL DAILY
Status: DISCONTINUED | OUTPATIENT
Start: 2025-05-02 | End: 2025-05-07 | Stop reason: HOSPADM

## 2025-05-02 RX ADMIN — AMLODIPINE BESYLATE 5 MG: 5 TABLET ORAL at 08:21

## 2025-05-02 RX ADMIN — ALLOPURINOL 300 MG: 300 TABLET ORAL at 19:45

## 2025-05-02 RX ADMIN — DULOXETINE 60 MG: 60 CAPSULE, DELAYED RELEASE ORAL at 08:21

## 2025-05-02 RX ADMIN — METOPROLOL SUCCINATE 50 MG: 50 TABLET, EXTENDED RELEASE ORAL at 14:01

## 2025-05-02 RX ADMIN — SODIUM CHLORIDE, PRESERVATIVE FREE 10 ML: 5 INJECTION INTRAVENOUS at 19:46

## 2025-05-02 RX ADMIN — METOPROLOL TARTRATE 50 MG: 50 TABLET, FILM COATED ORAL at 08:21

## 2025-05-02 RX ADMIN — PANTOPRAZOLE SODIUM 40 MG: 40 TABLET, DELAYED RELEASE ORAL at 08:21

## 2025-05-02 RX ADMIN — HEPARIN SODIUM 2000 UNITS: 1000 INJECTION INTRAVENOUS; SUBCUTANEOUS at 19:45

## 2025-05-02 RX ADMIN — HEPARIN SODIUM 18 UNITS/KG/HR: 10000 INJECTION, SOLUTION INTRAVENOUS at 00:19

## 2025-05-02 RX ADMIN — METOPROLOL TARTRATE 50 MG: 50 TABLET, FILM COATED ORAL at 00:18

## 2025-05-02 RX ADMIN — SODIUM CHLORIDE, PRESERVATIVE FREE 10 ML: 5 INJECTION INTRAVENOUS at 08:20

## 2025-05-02 RX ADMIN — HEPARIN SODIUM 12 UNITS/KG/HR: 10000 INJECTION, SOLUTION INTRAVENOUS at 10:08

## 2025-05-02 RX ADMIN — METOPROLOL SUCCINATE 50 MG: 50 TABLET, EXTENDED RELEASE ORAL at 19:45

## 2025-05-02 RX ADMIN — LEVOTHYROXINE SODIUM 75 MCG: 75 TABLET ORAL at 06:35

## 2025-05-02 RX ADMIN — FENOFIBRATE 160 MG: 160 TABLET ORAL at 08:21

## 2025-05-02 RX ADMIN — PRAVASTATIN SODIUM 40 MG: 20 TABLET ORAL at 19:45

## 2025-05-02 RX ADMIN — DOCUSATE SODIUM 100 MG: 100 CAPSULE, LIQUID FILLED ORAL at 08:21

## 2025-05-02 ASSESSMENT — PAIN SCALES - GENERAL: PAINLEVEL_OUTOF10: 0

## 2025-05-02 NOTE — PROGRESS NOTES
An echocardiogram demonstrates evidence of a normal-sized left ventricular chamber with global hypokinesis and overall moderate left ventricular systolic dysfunction with estimated ejection fraction of 40% and indeterminate diastolic function in the face of atrial arrhythmias with biatrial enlargement, moderate mitral regurgitation with associated mild pulmonary hypertension and a small-moderate circumferential pericardial effusion without evidence of hemodynamic compromise.  On this basis, optimization of medical therapy will be necessary with conversion of her beta-blocker from its tartrate to succinate preparation both to provide rate control as well as benefits to systolic function and with subsequent additional optimal medical therapy as tolerated.

## 2025-05-02 NOTE — PROGRESS NOTES
Spiritual Health History and Assessment/Progress Note  [facility]    Initial Encounter, Attempted Encounter,  ,  ,      Name: Roma Dias MRN: [id number]    Age: 64 y.o.     Sex: female   Language: English   Jewish: Cheondoism   Atrial fibrillation (HCC)     Date: 5/2/2025                           Spiritual Assessment began in [address] MED SURG        Referral/Consult From: Rounding   Encounter Overview/Reason: Initial Encounter, Attempted Encounter  Service Provided For: Patient    Tita, Belief, Meaning:   Patient is connected with a tita tradition or spiritual practice  Family/Friends No family/friends present      Importance and Influence:  Patient unable to assess at this time  Family/Friends No family/friends present    Community:  Patient feels well-supported. Support system includes: Spouse/Partner and Extended family  Family/Friends No family/friends present    Assessment and Plan of Care:     Patient Interventions include: Other: Patient asleep and appears peaceful, did not disturb. Prayer offered for the patient and a prayer card was left in the room  Family/Friends Interventions include: No family/friends present    Patient Plan of Care: Spiritual Care available upon further referral  Family/Friends Plan of Care: No family/friends present    Electronically signed by Chaplain Kellie on 5/2/2025 at 11:09 AM

## 2025-05-02 NOTE — PROGRESS NOTES
[facility] has made requests for orders and updated notes in order to provide new CPAP. Requirements of notes/orders will need clarified tomorrow to make sure [facility] has what they need to complete home sleep study.    Electronically signed by Giovana Sosa RN on 5/2/2025 at 7:33 PM

## 2025-05-02 NOTE — PROGRESS NOTES
Perfect serve to pulmonology service with the following message: Patient expressed to case ginger during rounds that her 9 year old CPAP is broken. She can get a new one. CM needs DME order for home sleep study and a note from referring provider for home sleep study.    Electronically signed by Giovana Sosa RN on 5/2/2025 at 11:01 AM

## 2025-05-02 NOTE — CONSULTS
Petey Khan M.D.,[facility]  Joshua Cordero D.O., MARK., [facility]  Carrol Alanis M.D.  Missy Medina M.D.   Vinnie Gandhi D.O.  Chirag Reyes M.D.       Patient:  Roma Dias 64 y.o. female MRN: [id number]           PULMONARY CONSULTATION    Reason for Consultation: Moderate bilateral pleural effusions, minimal PE  Referring Physician: Dr. Kp Barrientos    Communication with the referring physician will be sent via the electronic medical record.    Chief Complaint: SOB, BLE edema    CODE STATUS: Full    SUBJECTIVE:  HPI:  Roma Dias is a 64 y.o. female known to our practice, following Dr. Alanis for DARRIN treated with CPAP set at 9 cm.  Last seen in 2019.      She has a past medical history of CAD, hyperlipidemia, hypertension, hypothyroidism, and breast cancer.    She was admitted 5/1/25 with new onset A-fib RVR .  Patient presented to [place] ED with complaints of significant dyspnea with exertion for the past several weeks, following her last Keytruda infusion (for triple negative right breast cancer, every 3 weeks).  Complains of associated fatigue and intermittently has nausea with dry heaves.  The SOB has gradually increased in severity over that timeframe, however still was tolerable.  Denies a history of A-fib, but she states several years ago she was evaluated for palpitations, workup was never conclusive.  She was started on twice daily metoprolol and palpitations resolved.  Patient was started on 2 L oxygen at [place] for comfort, she was never hypoxic, and she states that this has greatly improved her dyspnea on exertion.      Lab work was notable for proBNP 4625, D-dimer 260, high-sensitivity troponin 19.  CTA did show a minimal pulmonary embolism in the left upper lobe with no sign of right heart strain and moderate bilateral pleural effusions.  Treated in ED with 25 mg p.o. metoprolol and started on heparin drip.    On examination in the room today she confirms the above

## 2025-05-02 NOTE — ED NOTES
Bed assignment 604-A in [place] and [facility] ETA 90 mins.   Nurse to Nurse report number is [phone]

## 2025-05-02 NOTE — CONSULTS
INPATIENT CARDIOLOGY CONSULT    Name: Roma Dias    Age: 64 y.o.    Date of Admission: 5/1/2025 10:47 PM    Date of Service: 5/2/2025    Reason for Consultation: Paroxysmal atrial fibrillation, carcinoma of the breast, hypertension, pulmonary embolism, pericardial effusion    Referring Physician: Kp Barrientos MD    History of Present Illness: The patient is a 64-year-old white female with no association to [facility] cardiology and no documented structural heart disease.  She has a risk profile of hypertension, hyperlipidemia and chronic obstructive lung disease in the face of active tobacco consumption in addition to that of recently diagnosed carcinoma of the breast with chemotherapy and ongoing immunotherapy with pending surgical intervention in addition to that of moderate obesity and obstructive sleep apnea.  She remained in her usual state of health until approximately the past 3 weeks when she noted the onset of exertional dyspnea and a reduction of her functional capabilities in the absence of additional manifestations of anginal-like chest discomfort or other ischemic equivalents, decompensated left ventricular systolic dysfunction or volume overload nor identified arrhythmias in spite of a longstanding history of palpitations.  On this basis she presented to the urgent care where she was noted to be tachycardic with a resting electrocardiogram reviewed time evaluation demonstrating evidence of atrial fibrillation with a mean ventricular sponsor approximate 115 bpm with low voltage, left axis deviation, delayed precordial transition and nonspecific ST changes a chest x-ray again reviewed demonstrating no evidence of cardiomegaly or infiltrate.  A contrast CT angiogram demonstrated evidence of a small subsegmental pulmonary embolism of the left upper lobe with minimal clot burden in addition to incidental findings of cardiomegaly, coronary calcification and a small pericardial effusion.  Additional  embolism with reported cardiomegaly, coronary calcification and a small pericardial effusion      ASSESSMENT / PLAN: On a clinical basis, the patient presents with evidence of a small subsegmental pulmonary embolism in addition to that of paroxysmal atrial fibrillation with uncertain duration contributing to her presenting symptomatology.  From a cardiovascular standpoint, at present rate control and anticoagulation will be initiated with conversion of heparin to that of oral anticoagulation deferred to the pulmonary service and initial therapy at dosing appropriate for her pulmonary embolism.  An echocardiogram is pending for assessment of the presence or absence of additional structural cardiac abnormalities contributing to her arrhythmias in addition to further evaluation of her reported pericardial effusion.  Unless adequate rate control cannot be achieved, a rate control and anticoagulation strategy will be maintained until therapeutic anticoagulation occurs with consideration of cardioversion if spontaneous resolution of her atrial arrhythmias does not occur.  Long-term anticoagulation will agree necessary both on the basis for atrial arrhythmias and embolic risk as well as to reduce risk of recurrent thromboembolic events.  Appropriate lifestyle modification inclusive of smoking cessation will be necessary to reduce risk of adverse effects related to that of her chronic obstructive lung disease as well as reducing risk of future atherosclerotic development.  Continued appropriate lifestyle modification to achieve weight reduction will benefit diastolic cardiac performance as well as assist management for obstructive sleep apnea with compliance with use of nocturnal CPAP essential to reducing risk of adverse cardiovascular effects.  On the basis of her coronary artery calcification ongoing aggressive risk factor modification of blood pressure and serum lipids will remain essential to reducing risk of future

## 2025-05-02 NOTE — ACP (ADVANCE CARE PLANNING)
Advance Care Planning   Healthcare Decision Maker:    Primary Decision Maker: Jimmy Dias - [facility] - [phone]    Click here to complete Healthcare Decision Makers including selection of the Healthcare Decision Maker Relationship (ie \"Primary\").  Today we documented Decision Maker(s) consistent with Legal Next of Kin hierarchy.

## 2025-05-02 NOTE — H&P
[facility] Hospitalist Group History and Physical      CHIEF COMPLAINT: New onset A-fib RVR    History of Present Illness:  This is a 64-year-old female with a past medical history of CAD, hyperlipidemia, hypertension, hypothyroidism, and breast cancer who is admitted with new onset A-fib RVR.  Patient presented to [address] ED with complaints of significant dyspnea with exertion for the past several weeks, following her last Keytruda infusion.  She is currently receiving Keytruda infusions for breast cancer every 3 weeks.  Complains of associated fatigue and intermittently has nausea with dry heaves. Denies chest pain or palpitations.  Denies fever, chills, cough, or congestion.  Denies a history of A-fib, but she states several years ago she was evaluated for palpitations and was told it could be due to A-fib, but workup was never conclusive.  She was started on twice daily metoprolol and palpitations resolved.    Vital signs on admission were 98.6, 117, 16, 109/89, 96% on room air.  Patient was started on 2 L oxygen at [address] for comfort, she was never hypoxic, and she states that this has greatly improved her dyspnea on exertion.  Lab work was notable for proBNP 4625.  Imaging negative for pulmonary edema.  CTA did show a minimal pulmonary embolism in the left upper lobe with no sign of right heart strain and moderate bilateral pleural effusions.  Treated in ED with 25 mg p.o. metoprolol and started on heparin drip.    Informant(s) for H&P: Patient and chart review    REVIEW OF SYSTEMS:  A comprehensive review of systems was negative except for: what is in the HPI      PMH:  Past Medical History:   Diagnosis Date    Angina     Anxiety attack     Arrhythmia     Symptomatic palpitatoins    Atrial fibrillation with RVR ([facility]) 5/1/2025    CAD (coronary artery disease)     CAD (coronary artery disease)     Chronic depression     Fatigue     Hyperlipidemia     Hypertension     Hypothyroidism      display       EKG: Not available    ASSESSMENT:      Principal Problem:    Atrial fibrillation with RVR (HCC)  Active Problems:    Essential hypertension    Acquired hypothyroidism    Triple negative breast cancer (HCC) [C50.919]  Resolved Problems:    * No resolved hospital problems. *      PLAN:    New onset A fib RVR: Rate currently controlled with metoprolol.  Will continue twice daily metoprolol.  Echo in AM.  Consult cardiology, appreciate recommendations.  [id number] score is 3, may benefit from anticoagulation.  Will continue heparin drip.  Pulmonary emboli: Likely secondary to hypercoagulable state due to breast cancer.  No right heart strain noted on CTA.  Continue heparin drip, transition to oral anticoagulation when appropriate.  Moderate bilateral pleural effusions: Moderate bilateral pleural effusions noted on imaging.  Will consult pulmonology, appreciate input.  Echo in AM.  Hypertension: Stable, continue home medications.  Hyperlipidemia: Stable continue home medications.  Hypothyroidism: Check TSH.  Continue Synthroid.  Breast cancer: Follows with .  Completed chemotherapy treatment and is currently receiving treatment every 3 weeks with Keytruda.  Currently being evaluated for surgery.    Code Status: Full  DVT prophylaxis: Heparin    45 minutes or more spent reviewing patient chart, assessing patient, discussing plan of care with patient and family, discussing plan of care with collaborating physician, and documentation.    NOTE: This report was transcribed using voice recognition software. Every effort was made to ensure accuracy; however, inadvertent computerized transcription errors may be present.  Electronically signed by BRICE Garcia CNP on 5/2/2025 at 12:16 AM

## 2025-05-02 NOTE — PLAN OF CARE
Problem: Discharge Planning  Goal: Discharge to home or other facility with appropriate resources  5/2/2025 0909 by Norberto Cardona, RN  Outcome: Progressing  5/2/2025 0007 by Trisha Reagan, RN  Outcome: Progressing

## 2025-05-02 NOTE — PROGRESS NOTES
Spiritual Health History and Assessment/Progress Note  [facility]     Encounter, Spiritual/Emotional Needs,  ,  ,      Name: Roma Dias MRN: [id number]    Age: 64 y.o.     Sex: female   Language: English   Voodoo: Buddhism   Atrial fibrillation (HCC)     Date: 5/2/2025                           Spiritual Assessment began in [address] MED SURG        Referral/Consult From: Rounding   Encounter Overview/Reason:  Encounter, Spiritual/Emotional Needs  Service Provided For: Patient    Tita, Belief, Meaning:   Patient is connected with a tita tradition or spiritual practice  Family/Friends are connected with a tita tradition or spiritual practice      Importance and Influence:  Patient has no beliefs influential to healthcare decision-making identified during this visit  Family/Friends have no beliefs influential to healthcare decision-making identified during this visit    Community:  Patient feels well-supported. Support system includes: Spouse/Partner  Family/Friends feel well-supported. Support system includes: Children    Assessment and Plan of Care:     Patient Interventions include: Facilitated expression of thoughts and feelings, Affirmed coping skills/support systems, and Other: Spiritual and emotional support, prayer and blessing.  Family/Friends Interventions include: Facilitated expression of thoughts and feelings and Affirmed coping skills/support systems    Patient Plan of Care: Spiritual Care available upon further referral  Family/Friends Plan of Care: Spiritual Care available upon further referral    Electronically signed by Chaplain Mauricio on 5/2/2025 at 3:44 PM

## 2025-05-02 NOTE — CARE COORDINATION
Introduced my self and provided explanation of CM role to patient. Admitted for new onset a-fib, PE 2/2 hypercoagulation d/t breast cancer. Cardiology and Pulmonology are  following. Patient has right port, follows at [facility] completed chemo is on Ketyruda, heparin gtt, 2L O2 baseline is RA. Will require pulse ox testing prior to discharge. ECHO results pending. Patient states she had a c-pap machine thru [employer] years ago that has been broken, CM placed call to [facility] with [facility] patient is eligible for a new c-pap machine, will need DME order for home sleep study and referring DrHerminio arias stating patient is being referred for home sleep study, charge nurse notified. She voices she resides at home with her spouse and completes her adl's with independence. Patient is established with Dr. Figueroa. Discharge plan is home when medically stable.   Cintia RAYN, RN  Case Management

## 2025-05-02 NOTE — PROGRESS NOTES
4 Eyes Skin Assessment     NAME:  Roma Dias  YOB: 1960  MEDICAL RECORD NUMBER:  [id number]    The patient is being assessed for  Admission    I agree that at least one RN has performed a thorough Head to Toe Skin Assessment on the patient. ALL assessment sites listed below have been assessed.      Areas assessed by both nurses:    Head, Face, Ears, Shoulders, Back, Chest, Arms, Elbows, Hands, Sacrum. Buttock, Coccyx, Ischium, Legs. Feet and Heels, and Under Medical Devices         Does the Patient have a Wound? No noted wound(s)       Richard Prevention initiated by RN: No  Wound Care Orders initiated by RN: No    Pressure Injury (Stage 3,4, Unstageable, DTI, NWPT, and Complex wounds) if present, place Wound referral order by RN under : No    New Ostomies, if present place, Ostomy referral order under : No     Nurse 1 eSignature: Electronically signed by Trisha Reagan RN on 5/2/25 at 12:07 AM EDT    **SHARE this note so that the co-signing nurse can place an eSignature**    Nurse 2 eSignature: Electronically signed by LENORE FINNEY RN on 5/2/25 at 12:42 AM EDT

## 2025-05-02 NOTE — PROGRESS NOTES
[facility] Hospitalist Progress Note    Admitting Date and Time: 5/1/2025 10:47 PM  Admit Dx: Atrial fibrillation with RVR (HCC) [I48.91]    Subjective:  Patient is being followed for Atrial fibrillation with RVR (HCC) [I48.91]   Pt was seen and examined today. Denies any new issues.  States she is feeling a little bit better now.    ROS: denies fever, chills, cp, sob, n/v, HA unless stated above.     allopurinol  300 mg Oral Nightly    docusate sodium  100 mg Oral Daily    DULoxetine  60 mg Oral Daily    fenofibrate  160 mg Oral Daily    levothyroxine  75 mcg Oral Daily    pravastatin  40 mg Oral Nightly    pantoprazole  40 mg Oral QAM AC    metoprolol tartrate  75 mg Oral BID    sodium chloride flush  5-40 mL IntraVENous 2 times per day    heparin (porcine)  4,000 Units IntraVENous Once     sodium chloride flush, 5-40 mL, PRN  sodium chloride, , PRN  potassium chloride, 40 mEq, PRN   Or  potassium alternative oral replacement, 40 mEq, PRN   Or  potassium chloride, 10 mEq, PRN  magnesium sulfate, 2,000 mg, PRN  polyethylene glycol, 17 g, Daily PRN  acetaminophen, 650 mg, Q6H PRN   Or  acetaminophen, 650 mg, Q6H PRN  heparin (porcine), 4,000 Units, PRN  heparin (porcine), 2,000 Units, PRN         Objective:    /81   Pulse (!) 102   Temp 97.7 °F (36.5 °C) (Oral)   Resp 18   Ht 1.6 m (5' 3\")   Wt 87.5 kg (192 lb 14.4 oz)   SpO2 97%   BMI 34.17 kg/m²     General Appearance: alert and in no acute distress  Skin: warm and dry  Head: normocephalic and atraumatic  Pulmonary/Chest: clear to auscultation bilaterally- no wheezes, rales or rhonchi, normal air movement, no respiratory distress  Cardiovascular: Irregularly irregular  Abdomen: soft, non-tender, non-distended, normal bowel sounds  Extremities: no cyanosis, no clubbing and 1+ edema      Recent Labs     05/01/25  1545 05/02/25  0640    140   K 3.5 3.1*    104   CO2 26 24   BUN 9 8   CREATININE 0.7 0.5   GLUCOSE 97 121*   CALCIUM 9.4

## 2025-05-03 LAB
ALBUMIN SERPL-MCNC: 3.6 G/DL (ref 3.5–5.2)
ALP SERPL-CCNC: 59 U/L (ref 35–104)
ALT SERPL-CCNC: 18 U/L (ref 0–32)
ANION GAP SERPL CALCULATED.3IONS-SCNC: 11 MMOL/L (ref 7–16)
AST SERPL-CCNC: 21 U/L (ref 0–31)
BILIRUB SERPL-MCNC: 0.4 MG/DL (ref 0–1.2)
BUN SERPL-MCNC: 7 MG/DL (ref 6–23)
CALCIUM SERPL-MCNC: 9.2 MG/DL (ref 8.6–10.2)
CHLORIDE SERPL-SCNC: 105 MMOL/L (ref 98–107)
CO2 SERPL-SCNC: 25 MMOL/L (ref 22–29)
CREAT SERPL-MCNC: 0.7 MG/DL (ref 0.5–1)
ERYTHROCYTE [DISTWIDTH] IN BLOOD BY AUTOMATED COUNT: 13.3 % (ref 11.5–15)
GFR, ESTIMATED: >90 ML/MIN/1.73M2
GLUCOSE SERPL-MCNC: 109 MG/DL (ref 74–99)
HCT VFR BLD AUTO: 33.1 % (ref 34–48)
HGB BLD-MCNC: 10.4 G/DL (ref 11.5–15.5)
MCH RBC QN AUTO: 29.8 PG (ref 26–35)
MCHC RBC AUTO-ENTMCNC: 31.4 G/DL (ref 32–34.5)
MCV RBC AUTO: 94.8 FL (ref 80–99.9)
PARTIAL THROMBOPLASTIN TIME: 57.1 SEC (ref 24.5–35.1)
PARTIAL THROMBOPLASTIN TIME: 64.7 SEC (ref 24.5–35.1)
PARTIAL THROMBOPLASTIN TIME: 88 SEC (ref 24.5–35.1)
PLATELET # BLD AUTO: 136 K/UL (ref 130–450)
PMV BLD AUTO: 13 FL (ref 7–12)
POTASSIUM SERPL-SCNC: 3.4 MMOL/L (ref 3.5–5)
PROT SERPL-MCNC: 6.4 G/DL (ref 6.4–8.3)
RBC # BLD AUTO: 3.49 M/UL (ref 3.5–5.5)
SODIUM SERPL-SCNC: 141 MMOL/L (ref 132–146)
WBC OTHER # BLD: 3.1 K/UL (ref 4.5–11.5)

## 2025-05-03 PROCEDURE — 80053 COMPREHEN METABOLIC PANEL: CPT

## 2025-05-03 PROCEDURE — 85730 THROMBOPLASTIN TIME PARTIAL: CPT

## 2025-05-03 PROCEDURE — 6370000000 HC RX 637 (ALT 250 FOR IP): Performed by: STUDENT IN AN ORGANIZED HEALTH CARE EDUCATION/TRAINING PROGRAM

## 2025-05-03 PROCEDURE — 99233 SBSQ HOSP IP/OBS HIGH 50: CPT | Performed by: INTERNAL MEDICINE

## 2025-05-03 PROCEDURE — 6360000002 HC RX W HCPCS

## 2025-05-03 PROCEDURE — 2500000003 HC RX 250 WO HCPCS

## 2025-05-03 PROCEDURE — 6370000000 HC RX 637 (ALT 250 FOR IP)

## 2025-05-03 PROCEDURE — 85027 COMPLETE CBC AUTOMATED: CPT

## 2025-05-03 PROCEDURE — 6360000002 HC RX W HCPCS: Performed by: INTERNAL MEDICINE

## 2025-05-03 PROCEDURE — 2700000000 HC OXYGEN THERAPY PER DAY

## 2025-05-03 PROCEDURE — 6370000000 HC RX 637 (ALT 250 FOR IP): Performed by: INTERNAL MEDICINE

## 2025-05-03 PROCEDURE — 2060000000 HC ICU INTERMEDIATE R&B

## 2025-05-03 RX ORDER — POTASSIUM CHLORIDE 1500 MG/1
40 TABLET, EXTENDED RELEASE ORAL ONCE
Status: DISCONTINUED | OUTPATIENT
Start: 2025-05-03 | End: 2025-05-03

## 2025-05-03 RX ORDER — FUROSEMIDE 10 MG/ML
20 INJECTION INTRAMUSCULAR; INTRAVENOUS DAILY
Status: DISCONTINUED | OUTPATIENT
Start: 2025-05-03 | End: 2025-05-07 | Stop reason: HOSPADM

## 2025-05-03 RX ORDER — ONDANSETRON 2 MG/ML
4 INJECTION INTRAMUSCULAR; INTRAVENOUS EVERY 6 HOURS PRN
Status: DISCONTINUED | OUTPATIENT
Start: 2025-05-03 | End: 2025-05-07 | Stop reason: HOSPADM

## 2025-05-03 RX ORDER — MAGNESIUM SULFATE IN WATER 40 MG/ML
2000 INJECTION, SOLUTION INTRAVENOUS ONCE
Status: COMPLETED | OUTPATIENT
Start: 2025-05-03 | End: 2025-05-03

## 2025-05-03 RX ADMIN — FUROSEMIDE 20 MG: 10 INJECTION, SOLUTION INTRAMUSCULAR; INTRAVENOUS at 08:58

## 2025-05-03 RX ADMIN — PRAVASTATIN SODIUM 40 MG: 20 TABLET ORAL at 19:36

## 2025-05-03 RX ADMIN — SODIUM CHLORIDE, PRESERVATIVE FREE 10 ML: 5 INJECTION INTRAVENOUS at 08:59

## 2025-05-03 RX ADMIN — ONDANSETRON 4 MG: 2 INJECTION, SOLUTION INTRAMUSCULAR; INTRAVENOUS at 01:00

## 2025-05-03 RX ADMIN — POTASSIUM CHLORIDE 40 MEQ: 1500 TABLET, EXTENDED RELEASE ORAL at 06:06

## 2025-05-03 RX ADMIN — ALLOPURINOL 300 MG: 300 TABLET ORAL at 19:36

## 2025-05-03 RX ADMIN — METOPROLOL SUCCINATE 50 MG: 50 TABLET, EXTENDED RELEASE ORAL at 19:36

## 2025-05-03 RX ADMIN — PANTOPRAZOLE SODIUM 40 MG: 40 TABLET, DELAYED RELEASE ORAL at 06:06

## 2025-05-03 RX ADMIN — DULOXETINE 60 MG: 60 CAPSULE, DELAYED RELEASE ORAL at 08:59

## 2025-05-03 RX ADMIN — METOPROLOL SUCCINATE 50 MG: 50 TABLET, EXTENDED RELEASE ORAL at 08:59

## 2025-05-03 RX ADMIN — SODIUM CHLORIDE, PRESERVATIVE FREE 10 ML: 5 INJECTION INTRAVENOUS at 19:38

## 2025-05-03 RX ADMIN — ONDANSETRON 4 MG: 2 INJECTION, SOLUTION INTRAMUSCULAR; INTRAVENOUS at 20:47

## 2025-05-03 RX ADMIN — FENOFIBRATE 160 MG: 160 TABLET ORAL at 08:59

## 2025-05-03 RX ADMIN — DOCUSATE SODIUM 100 MG: 100 CAPSULE, LIQUID FILLED ORAL at 08:59

## 2025-05-03 RX ADMIN — HEPARIN SODIUM 12 UNITS/KG/HR: 10000 INJECTION, SOLUTION INTRAVENOUS at 15:20

## 2025-05-03 RX ADMIN — LEVOTHYROXINE SODIUM 75 MCG: 75 TABLET ORAL at 06:06

## 2025-05-03 RX ADMIN — MAGNESIUM SULFATE HEPTAHYDRATE 2000 MG: 40 INJECTION, SOLUTION INTRAVENOUS at 08:55

## 2025-05-03 ASSESSMENT — PAIN SCALES - GENERAL: PAINLEVEL_OUTOF10: 0

## 2025-05-03 NOTE — PLAN OF CARE
Problem: Discharge Planning  Goal: Discharge to home or other facility with appropriate resources  5/2/2025 2220 by Trisha Reagan, RN  Outcome: Progressing  5/2/2025 0909 by Norberto Cardona, RN  Outcome: Progressing

## 2025-05-03 NOTE — PROGRESS NOTES
[facility] Hospitalist Progress Note    Admitting Date and Time: 5/1/2025 10:47 PM  Admit Dx: Atrial fibrillation with RVR (HCC) [I48.91]      The patient is a 64-year-old white female with h/o hypertension, hyperlipidemia and chronic obstructive lung disease, active tobacco use, recently diagnosed carcinoma of the breast with chemotherapy and ongoing immunotherapy with pending surgical intervention, moderate obesity and obstructive sleep apnea.     Subjective:  Patient is being followed for Atrial fibrillation with RVR (HCC) [I48.91]   Pt was seen and examined today. Denies any new issues.  States she is feeling a little bit better now.    ROS: denies fever, chills, cp, sob, n/v, HA unless stated above.     allopurinol  300 mg Oral Nightly    docusate sodium  100 mg Oral Daily    DULoxetine  60 mg Oral Daily    fenofibrate  160 mg Oral Daily    levothyroxine  75 mcg Oral Daily    pravastatin  40 mg Oral Nightly    pantoprazole  40 mg Oral QAM AC    metoprolol succinate  50 mg Oral BID    sodium chloride flush  5-40 mL IntraVENous 2 times per day    heparin (porcine)  4,000 Units IntraVENous Once     ondansetron, 4 mg, Q6H PRN  sodium chloride flush, 5-40 mL, PRN  sodium chloride, , PRN  potassium chloride, 40 mEq, PRN   Or  potassium alternative oral replacement, 40 mEq, PRN   Or  potassium chloride, 10 mEq, PRN  magnesium sulfate, 2,000 mg, PRN  polyethylene glycol, 17 g, Daily PRN  acetaminophen, 650 mg, Q6H PRN   Or  acetaminophen, 650 mg, Q6H PRN  heparin (porcine), 4,000 Units, PRN  heparin (porcine), 2,000 Units, PRN         Objective:    /82   Pulse 96   Temp 98.7 °F (37.1 °C) (Oral)   Resp 18   Ht 1.6 m (5' 3\")   Wt 87.5 kg (192 lb 14.4 oz)   SpO2 100%   BMI 34.17 kg/m²     General Appearance: alert and in no acute distress  Skin: warm and dry  Head: normocephalic and atraumatic  Pulmonary/Chest: clear to auscultation bilaterally- no wheezes, rales or rhonchi, normal air movement, no

## 2025-05-04 LAB
ANION GAP SERPL CALCULATED.3IONS-SCNC: 9 MMOL/L (ref 7–16)
BUN SERPL-MCNC: 12 MG/DL (ref 6–23)
CALCIUM SERPL-MCNC: 9.2 MG/DL (ref 8.6–10.2)
CHLORIDE SERPL-SCNC: 103 MMOL/L (ref 98–107)
CO2 SERPL-SCNC: 28 MMOL/L (ref 22–29)
CREAT SERPL-MCNC: 0.7 MG/DL (ref 0.5–1)
GFR, ESTIMATED: >90 ML/MIN/1.73M2
GLUCOSE SERPL-MCNC: 107 MG/DL (ref 74–99)
MAGNESIUM SERPL-MCNC: 1.9 MG/DL (ref 1.6–2.6)
PARTIAL THROMBOPLASTIN TIME: 60.3 SEC (ref 24.5–35.1)
POTASSIUM SERPL-SCNC: 3.4 MMOL/L (ref 3.5–5)
SODIUM SERPL-SCNC: 140 MMOL/L (ref 132–146)

## 2025-05-04 PROCEDURE — 6370000000 HC RX 637 (ALT 250 FOR IP): Performed by: INTERNAL MEDICINE

## 2025-05-04 PROCEDURE — 2700000000 HC OXYGEN THERAPY PER DAY

## 2025-05-04 PROCEDURE — 6360000002 HC RX W HCPCS: Performed by: INTERNAL MEDICINE

## 2025-05-04 PROCEDURE — 2500000003 HC RX 250 WO HCPCS

## 2025-05-04 PROCEDURE — 6370000000 HC RX 637 (ALT 250 FOR IP)

## 2025-05-04 PROCEDURE — 83735 ASSAY OF MAGNESIUM: CPT

## 2025-05-04 PROCEDURE — 6360000002 HC RX W HCPCS

## 2025-05-04 PROCEDURE — 99232 SBSQ HOSP IP/OBS MODERATE 35: CPT | Performed by: INTERNAL MEDICINE

## 2025-05-04 PROCEDURE — 85730 THROMBOPLASTIN TIME PARTIAL: CPT

## 2025-05-04 PROCEDURE — 2060000000 HC ICU INTERMEDIATE R&B

## 2025-05-04 PROCEDURE — 80048 BASIC METABOLIC PNL TOTAL CA: CPT

## 2025-05-04 RX ORDER — POTASSIUM CHLORIDE 1500 MG/1
40 TABLET, EXTENDED RELEASE ORAL ONCE
Status: DISCONTINUED | OUTPATIENT
Start: 2025-05-04 | End: 2025-05-04

## 2025-05-04 RX ORDER — MAGNESIUM SULFATE IN WATER 40 MG/ML
2000 INJECTION, SOLUTION INTRAVENOUS ONCE
Status: COMPLETED | OUTPATIENT
Start: 2025-05-04 | End: 2025-05-04

## 2025-05-04 RX ADMIN — ALLOPURINOL 300 MG: 300 TABLET ORAL at 19:41

## 2025-05-04 RX ADMIN — MAGNESIUM SULFATE HEPTAHYDRATE 2000 MG: 40 INJECTION, SOLUTION INTRAVENOUS at 08:46

## 2025-05-04 RX ADMIN — LEVOTHYROXINE SODIUM 75 MCG: 75 TABLET ORAL at 05:17

## 2025-05-04 RX ADMIN — FENOFIBRATE 160 MG: 160 TABLET ORAL at 08:45

## 2025-05-04 RX ADMIN — DULOXETINE 60 MG: 60 CAPSULE, DELAYED RELEASE ORAL at 08:45

## 2025-05-04 RX ADMIN — ONDANSETRON 4 MG: 2 INJECTION, SOLUTION INTRAMUSCULAR; INTRAVENOUS at 23:14

## 2025-05-04 RX ADMIN — DOCUSATE SODIUM 100 MG: 100 CAPSULE, LIQUID FILLED ORAL at 08:45

## 2025-05-04 RX ADMIN — SODIUM CHLORIDE, PRESERVATIVE FREE 10 ML: 5 INJECTION INTRAVENOUS at 19:43

## 2025-05-04 RX ADMIN — HEPARIN SODIUM 12 UNITS/KG/HR: 10000 INJECTION, SOLUTION INTRAVENOUS at 14:39

## 2025-05-04 RX ADMIN — SODIUM CHLORIDE, PRESERVATIVE FREE 10 ML: 5 INJECTION INTRAVENOUS at 08:45

## 2025-05-04 RX ADMIN — ONDANSETRON 4 MG: 2 INJECTION, SOLUTION INTRAMUSCULAR; INTRAVENOUS at 13:54

## 2025-05-04 RX ADMIN — POTASSIUM CHLORIDE 40 MEQ: 1500 TABLET, EXTENDED RELEASE ORAL at 05:17

## 2025-05-04 RX ADMIN — METOPROLOL SUCCINATE 50 MG: 50 TABLET, EXTENDED RELEASE ORAL at 19:41

## 2025-05-04 RX ADMIN — PRAVASTATIN SODIUM 40 MG: 20 TABLET ORAL at 19:41

## 2025-05-04 RX ADMIN — METOPROLOL SUCCINATE 50 MG: 50 TABLET, EXTENDED RELEASE ORAL at 08:45

## 2025-05-04 RX ADMIN — FUROSEMIDE 20 MG: 10 INJECTION, SOLUTION INTRAMUSCULAR; INTRAVENOUS at 08:45

## 2025-05-04 ASSESSMENT — PAIN SCALES - GENERAL: PAINLEVEL_OUTOF10: 0

## 2025-05-04 NOTE — PLAN OF CARE
Problem: Discharge Planning  Goal: Discharge to home or other facility with appropriate resources  5/3/2025 2121 by Ewa Hart RN  Outcome: Progressing  5/3/2025 1044 by Nancy Villatoro RN  Outcome: Progressing     Problem: Respiratory - Adult  Goal: Achieves optimal ventilation and oxygenation  5/3/2025 2121 by Ewa Hart RN  Outcome: Progressing  5/3/2025 1044 by Nancy Villatoro RN  Outcome: Progressing     Problem: Cardiovascular - Adult  Goal: Maintains optimal cardiac output and hemodynamic stability  5/3/2025 2121 by Ewa Hart RN  Outcome: Progressing  5/3/2025 1044 by Nancy Villatoro RN  Outcome: Progressing  Goal: Absence of cardiac dysrhythmias or at baseline  5/3/2025 2121 by Ewa Hart RN  Outcome: Progressing  5/3/2025 1044 by Nancy Villatoro RN  Outcome: Progressing     Problem: Metabolic/Fluid and Electrolytes - Adult  Goal: Electrolytes maintained within normal limits  5/3/2025 2121 by Ewa Hart RN  Outcome: Progressing  5/3/2025 1044 by Nancy Villatoro RN  Outcome: Progressing  Goal: Hemodynamic stability and optimal renal function maintained  5/3/2025 2121 by Ewa Hart RN  Outcome: Progressing  5/3/2025 1044 by Nancy Villatoro RN  Outcome: Progressing  Goal: Glucose maintained within prescribed range  5/3/2025 2121 by Ewa Hart RN  Outcome: Progressing  5/3/2025 1044 by Nancy Villatoro RN  Outcome: Progressing

## 2025-05-04 NOTE — CARE COORDINATION
Social Work discharge planning  Discussed cpap set up with charge Rn. Pt's sleep study order placed 5/2/25 per [facility]. Pt will need to complete outpt sleep study before home cpap can be set up. Confirmed with [facility] at [facility].  Electronically signed by ELIF Ga on 5/4/2025 at 8:36 AM     Addendum  Pt does NOT have home 02. If unable to be room air at discharge, will need pulse ox testing as follows:    **ATTENTION BEDSIDE RN**    Please copy below template, fill in appropriate fields, and place in a progress note.    Testing MUST be completed within but no later than 48 hours of discharge.     Please ambulate patient for a MINIMUM of 6 minutes to ensure accuracy of test.      Pulse ox was _______ % on room air at rest.  Ambulated patient on room air.    Oxygen saturation was _______ % on room air while ambulating. (lowest saturation reached)  Oxygen applied.    Recovery pulse ox was _______% on ____ liters of oxygen while ambulating.       Electronically signed by ELIF Ga on 5/4/2025 at 11:53 AM

## 2025-05-04 NOTE — PROGRESS NOTES
[facility] Hospitalist Progress Note    Admitting Date and Time: 5/1/2025 10:47 PM  Admit Dx: Atrial fibrillation with RVR (HCC) [I48.91]      The patient is a 64-year-old white female with h/o hypertension, hyperlipidemia and chronic obstructive lung disease, active tobacco use, recently diagnosed carcinoma of the breast with chemotherapy and ongoing immunotherapy with pending surgical intervention, moderate obesity and obstructive sleep apnea.     Subjective:  Patient is being followed for Atrial fibrillation with RVR (HCC) [I48.91]   Pt was seen and examined today. Denies any new issues.  States she is feeling a little bit better now.    ROS: denies fever, chills, cp, sob, n/v, HA unless stated above.     furosemide  20 mg IntraVENous Daily    allopurinol  300 mg Oral Nightly    docusate sodium  100 mg Oral Daily    DULoxetine  60 mg Oral Daily    fenofibrate  160 mg Oral Daily    levothyroxine  75 mcg Oral Daily    pravastatin  40 mg Oral Nightly    metoprolol succinate  50 mg Oral BID    sodium chloride flush  5-40 mL IntraVENous 2 times per day    heparin (porcine)  4,000 Units IntraVENous Once     ondansetron, 4 mg, Q6H PRN  sodium chloride flush, 5-40 mL, PRN  sodium chloride, , PRN  potassium chloride, 40 mEq, PRN   Or  potassium alternative oral replacement, 40 mEq, PRN   Or  potassium chloride, 10 mEq, PRN  magnesium sulfate, 2,000 mg, PRN  polyethylene glycol, 17 g, Daily PRN  acetaminophen, 650 mg, Q6H PRN   Or  acetaminophen, 650 mg, Q6H PRN  heparin (porcine), 4,000 Units, PRN  heparin (porcine), 2,000 Units, PRN         Objective:    /88   Pulse (!) 107   Temp 97.7 °F (36.5 °C) (Oral)   Resp 16   Ht 1.6 m (5' 3\")   Wt 87.5 kg (192 lb 14.4 oz)   SpO2 98%   BMI 34.17 kg/m²     General Appearance: alert and in no acute distress  Skin: warm and dry  Head: normocephalic and atraumatic  Pulmonary/Chest: clear to auscultation bilaterally- no wheezes, rales or rhonchi, normal air

## 2025-05-05 ENCOUNTER — TELEPHONE (OUTPATIENT)
Dept: BREAST CENTER | Age: 65
End: 2025-05-05

## 2025-05-05 LAB
ANION GAP SERPL CALCULATED.3IONS-SCNC: 11 MMOL/L (ref 7–16)
BNP SERPL-MCNC: 5909 PG/ML (ref 0–125)
BUN SERPL-MCNC: 9 MG/DL (ref 6–23)
CALCIUM SERPL-MCNC: 9.3 MG/DL (ref 8.6–10.2)
CHLORIDE SERPL-SCNC: 102 MMOL/L (ref 98–107)
CO2 SERPL-SCNC: 25 MMOL/L (ref 22–29)
CREAT SERPL-MCNC: 0.7 MG/DL (ref 0.5–1)
ERYTHROCYTE [DISTWIDTH] IN BLOOD BY AUTOMATED COUNT: 13.5 % (ref 11.5–15)
GFR, ESTIMATED: 89 ML/MIN/1.73M2
GLUCOSE SERPL-MCNC: 101 MG/DL (ref 74–99)
HCT VFR BLD AUTO: 34.8 % (ref 34–48)
HGB BLD-MCNC: 10.6 G/DL (ref 11.5–15.5)
MAGNESIUM SERPL-MCNC: 1.8 MG/DL (ref 1.6–2.6)
MCH RBC QN AUTO: 29.3 PG (ref 26–35)
MCHC RBC AUTO-ENTMCNC: 30.5 G/DL (ref 32–34.5)
MCV RBC AUTO: 96.1 FL (ref 80–99.9)
PARTIAL THROMBOPLASTIN TIME: 54.4 SEC (ref 24.5–35.1)
PLATELET CONFIRMATION: NORMAL
PLATELET, FLUORESCENCE: 142 K/UL (ref 130–450)
PMV BLD AUTO: 13.3 FL (ref 7–12)
POTASSIUM SERPL-SCNC: 3.5 MMOL/L (ref 3.5–5)
RBC # BLD AUTO: 3.62 M/UL (ref 3.5–5.5)
SODIUM SERPL-SCNC: 138 MMOL/L (ref 132–146)
WBC OTHER # BLD: 3.2 K/UL (ref 4.5–11.5)

## 2025-05-05 PROCEDURE — 85730 THROMBOPLASTIN TIME PARTIAL: CPT

## 2025-05-05 PROCEDURE — 2060000000 HC ICU INTERMEDIATE R&B

## 2025-05-05 PROCEDURE — 83880 ASSAY OF NATRIURETIC PEPTIDE: CPT

## 2025-05-05 PROCEDURE — 6360000002 HC RX W HCPCS: Performed by: INTERNAL MEDICINE

## 2025-05-05 PROCEDURE — 6370000000 HC RX 637 (ALT 250 FOR IP): Performed by: INTERNAL MEDICINE

## 2025-05-05 PROCEDURE — 6360000002 HC RX W HCPCS

## 2025-05-05 PROCEDURE — 99232 SBSQ HOSP IP/OBS MODERATE 35: CPT | Performed by: INTERNAL MEDICINE

## 2025-05-05 PROCEDURE — 2500000003 HC RX 250 WO HCPCS

## 2025-05-05 PROCEDURE — 85027 COMPLETE CBC AUTOMATED: CPT

## 2025-05-05 PROCEDURE — 83735 ASSAY OF MAGNESIUM: CPT

## 2025-05-05 PROCEDURE — 99233 SBSQ HOSP IP/OBS HIGH 50: CPT | Performed by: INTERNAL MEDICINE

## 2025-05-05 PROCEDURE — 80048 BASIC METABOLIC PNL TOTAL CA: CPT

## 2025-05-05 PROCEDURE — 6370000000 HC RX 637 (ALT 250 FOR IP)

## 2025-05-05 PROCEDURE — 2700000000 HC OXYGEN THERAPY PER DAY

## 2025-05-05 RX ORDER — DIGOXIN 0.25 MG/ML
250 INJECTION INTRAMUSCULAR; INTRAVENOUS EVERY 6 HOURS
Status: COMPLETED | OUTPATIENT
Start: 2025-05-05 | End: 2025-05-06

## 2025-05-05 RX ORDER — POTASSIUM CHLORIDE 1500 MG/1
20 TABLET, EXTENDED RELEASE ORAL DAILY
Status: DISCONTINUED | OUTPATIENT
Start: 2025-05-05 | End: 2025-05-05

## 2025-05-05 RX ORDER — POTASSIUM CHLORIDE 1500 MG/1
20 TABLET, EXTENDED RELEASE ORAL DAILY
Status: DISCONTINUED | OUTPATIENT
Start: 2025-05-06 | End: 2025-05-07 | Stop reason: HOSPADM

## 2025-05-05 RX ADMIN — POTASSIUM CHLORIDE 40 MEQ: 1500 TABLET, EXTENDED RELEASE ORAL at 05:45

## 2025-05-05 RX ADMIN — FUROSEMIDE 20 MG: 10 INJECTION, SOLUTION INTRAMUSCULAR; INTRAVENOUS at 08:30

## 2025-05-05 RX ADMIN — DOCUSATE SODIUM 100 MG: 100 CAPSULE, LIQUID FILLED ORAL at 08:30

## 2025-05-05 RX ADMIN — LEVOTHYROXINE SODIUM 75 MCG: 75 TABLET ORAL at 05:45

## 2025-05-05 RX ADMIN — HEPARIN SODIUM 12 UNITS/KG/HR: 10000 INJECTION, SOLUTION INTRAVENOUS at 14:54

## 2025-05-05 RX ADMIN — DULOXETINE 60 MG: 60 CAPSULE, DELAYED RELEASE ORAL at 08:30

## 2025-05-05 RX ADMIN — DIGOXIN 250 MCG: 0.25 INJECTION INTRAMUSCULAR; INTRAVENOUS at 17:55

## 2025-05-05 RX ADMIN — FENOFIBRATE 160 MG: 160 TABLET ORAL at 08:30

## 2025-05-05 RX ADMIN — SODIUM CHLORIDE, PRESERVATIVE FREE 10 ML: 5 INJECTION INTRAVENOUS at 08:30

## 2025-05-05 RX ADMIN — METOPROLOL SUCCINATE 50 MG: 50 TABLET, EXTENDED RELEASE ORAL at 08:30

## 2025-05-05 RX ADMIN — METOPROLOL SUCCINATE 50 MG: 50 TABLET, EXTENDED RELEASE ORAL at 20:56

## 2025-05-05 RX ADMIN — DIGOXIN 250 MCG: 0.25 INJECTION INTRAMUSCULAR; INTRAVENOUS at 12:12

## 2025-05-05 RX ADMIN — ALLOPURINOL 300 MG: 300 TABLET ORAL at 20:56

## 2025-05-05 RX ADMIN — PRAVASTATIN SODIUM 40 MG: 20 TABLET ORAL at 20:56

## 2025-05-05 NOTE — PROGRESS NOTES
Internal Medicine Progress Note      Synopsis: Patient admitted on 5/1/2025 presented with atrial fibrillation with RVR. She is currently receiving Keytruda for past several weeks for her breast cancer. proBNP 4625. Imaging negative for pulmonary edema. CTA did show a minimal pulmonary embolism in the left upper lobe with no sign of right heart strain and moderate bilateral pleural effusions. Treated in ED with 25 mg p.o. metoprolol and started on heparin drip. Pulmonology and cardiology consulted. Given IV Diuresis. Cardiology recommending RICHARD with cardioversion. Given digoxin loading. Continue Toprol XL 50 mg BID.       Assessment and Plan     Atrial Fibrillation with RVR  Heparin drip   Toprol XL 50 mg BID  Telemetry  Cardiology following   RICHARD and cardioversion tomorrow     Acute new onset LV Dysfunction EF 40%  Needs LHC prior to discharge  Consider low dose entresto if patient's blood pressure remains stable       HLD  Continue pravasatin and fenofibrate     Pulmonary embolism   Pulmonology following   Heparin drip  Needs ambulatory pulse 0x prior to discharge.   ECHO with no right heart strain     Triple negative breast cancer   Continue follow up with oncology.           DVT Prophylaxis: Heparin   Disposition: Remains inpatient for RICHARD and cardioversion and LHC     Subjective:     Doing okay   Anxious about procedure tomorrow   Asking if it will be painful.   Otherwise, no chest pain or shortness of breath       Exam:  /78   Pulse 89   Temp 97.5 °F (36.4 °C) (Oral)   Resp 18   Ht 1.6 m (5' 3\")   Wt 87.5 kg (192 lb 14.4 oz)   SpO2 97%   BMI 34.17 kg/m²   General appearance: No apparent distress, appears stated age and cooperative.  Respiratory:  Normal respiratory effort. Clear to auscultation, bilaterally without Rales/Wheezes/Rhonchi.  Cardiovascular: Regular rate and rhythm with normal S1/S2 without murmurs, rubs or gallops.  Abdomen: Soft, non-tender, non-distended with normal bowel

## 2025-05-05 NOTE — PROGRESS NOTES
ventricular strain.     There is no sign of additional pulmonary emboli.     Main pulmonary artery is normal in caliber.     Mediastinum: No evidence of mediastinal lymphadenopathy.     There is mild cardiomegaly with four-chamber dilatation.  There is mild LAD coronary calcification.  There is a mild pericardial effusion.     There is no acute abnormality of the thoracic aorta.     Lungs/pleura: There are moderate bilateral pleural effusions, larger on the left than the right.  There is moderate left greater than right posterior lower lobe compressive atelectasis.     Upper Abdomen: Limited images of the upper abdomen are unremarkable.     Soft Tissues/Bones: There is a moderate T8 compression fracture.  There are mild T9-T11 compression fractures.  There is partial fusion of T9 and T10 in anatomic alignment.  These compression fractures result in increased inferior thoracic kyphosis.     I discussed the findings with JACKLYN Capone at 6:13 p.m. on 05/01/2025.     IMPRESSION:  1. Minimal pulmonary embolism, with a tiny nonobstructive thrombus seen involving the posterior aspect of the apicoposterior segment of the left upper lobe in the perihilar region. Clot burden is minimal, and there is no sign of pulmonary infarction. There is no sign of right ventricular strain.  2. Moderate bilateral pleural effusions, larger on the left than the right. Moderate left greater than right posterior lower lobe compressive atelectasis.  3. Mild cardiomegaly with four-chamber dilatation. Mild LAD coronary calcification. Mild pericardial effusion.  4. Moderate T8 compression fracture. Mild T9-T11 compression fractures. Partial fusion of T9 and T10 in anatomic alignment. These compression fractures result in increased inferior thoracic kyphosis.              MRI breast 4/8/25  IMPRESSION:  Decreased size and decreased intervening enhancement in area of known biopsy-proven malignancy in the upper inner right breast with 3 separate  atelectasis  Triple negative breast cancer (right) completed chemo and transition to Keytruda infusions every 3 weeks  New onset A-fib RVR   Hypertension  Hyperlipidemia      Plan:   Check ambulatory oxygen needs-orders placed  Heparin drip infusing.  Okay to transition to Eliquis from pulmonary standpoint.  Will defer management to hematology/oncology  Echocardiogram reviewed, no evidence of right heart strain  Follows with Dr. Castellano, completed chemo and is currently receiving Keytruda every 3 weeks, tumor resection surgery in the works  Orders placed for outpatient titration sleep study to qualify patient for new PAP device  Okay to discharge from pulmonary standpoint.  Will sign off at this time.  Please call if needed  Message routed to office for follow-up    This plan of care was reviewed in collaboration with Dr. Reyes    Electronically signed by BRICE Barger CNP on 5/5/2025 at 3:23 PM        This is confirmation that I have personally performed a substantial portion of medical decision making (>50%) related to this patient encounter.  The medications & laboratory data and imagery were discussed and adjusted where necessary. Key issues of the case were discussed among consultants.  Review of CNP documentation was conducted and revisions were made as appropriate. I agree with the above documented exam, problem list and plan of care.    Chirag Reyes MD

## 2025-05-05 NOTE — PROGRESS NOTES
INPATIENT CARDIOLOGY FOLLOW-UP    Name: Roma Dias    Age: 64 y.o.    Date of Admission: 5/1/2025 10:47 PM    Date of Service: 5/5/2025    Chief Complaint: Follow-up for paroxysmal atrial fibrillation, carcinoma of the breast, hypertension, pulmonary embolism and pericardial effusion.    Interim History:  No new overnight cardiac complaints. Currently with no complaints of CP, SOB, palpitations, dizziness, or lightheadedness.  Currently patient on Keytruda every 3 weeks.  Atrial fibrillation with RVR on telemetry.    Review of Systems:   Cardiac: As per HPI  General: No fever, chills  Pulmonary: As per HPI  HEENT: No visual disturbances, difficult swallowing  GI: No nausea, vomiting  Endocrine: No thyroid disease or DM  Musculoskeletal: FERNÁNDEZ x 4, no focal motor deficits  Skin: Intact, no rashes  Neuro/Psych: No headache or seizures    Problem List:  Patient Active Problem List   Diagnosis    Chronic depression    Coronary artery calcification    Primary hypertension    Obstructive sleep apnea    Acquired hypothyroidism    Moderate obesity    Chronic obstructive pulmonary disease (HCC)    Cigarette nicotine dependence without complication    Triple negative breast cancer (HCC) [C50.919]    Atrial fibrillation (HCC)    Single subsegmental pulmonary embolism without acute cor pulmonale (HCC)    Carcinoma of right female breast (HCC)    Pure hypercholesterolemia    Pericardial effusion       Allergies:  Allergies   Allergen Reactions    Biaxin [Clarithromycin] Hallucinations     psychosis    Levofloxacin Other (See Comments)     Patient unsure reaction       Current Medications:  Current Facility-Administered Medications   Medication Dose Route Frequency Provider Last Rate Last Admin    ondansetron (ZOFRAN) injection 4 mg  4 mg IntraVENous Q6H PRN Faith Kearney, APRN - CNP   4 mg at 05/04/25 2312    furosemide (LASIX) injection 20 mg  20 mg IntraVENous Daily Teri Herrera MD   20 mg at 05/05/25 5402

## 2025-05-05 NOTE — PLAN OF CARE
Problem: Discharge Planning  Goal: Discharge to home or other facility with appropriate resources  5/4/2025 2249 by Ewa Hart RN  Outcome: Progressing  5/4/2025 1035 by Nancy Villatoro RN  Outcome: Progressing     Problem: Respiratory - Adult  Goal: Achieves optimal ventilation and oxygenation  5/4/2025 2249 by Ewa Hart RN  Outcome: Progressing  5/4/2025 1035 by Nancy Villatoro RN  Outcome: Progressing     Problem: Cardiovascular - Adult  Goal: Maintains optimal cardiac output and hemodynamic stability  5/4/2025 2249 by Ewa Hart RN  Outcome: Progressing  5/4/2025 1035 by Nancy Villatoro RN  Outcome: Progressing  Goal: Absence of cardiac dysrhythmias or at baseline  5/4/2025 2249 by Ewa Hart RN  Outcome: Progressing  5/4/2025 1035 by Nancy Villatoro RN  Outcome: Progressing     Problem: Metabolic/Fluid and Electrolytes - Adult  Goal: Electrolytes maintained within normal limits  5/4/2025 2249 by Ewa Hart RN  Outcome: Progressing  5/4/2025 1035 by Nancy Villatoro RN  Outcome: Progressing  Goal: Hemodynamic stability and optimal renal function maintained  5/4/2025 2249 by Ewa Hart RN  Outcome: Progressing  5/4/2025 1035 by Nancy Villatoro RN  Outcome: Progressing  Goal: Glucose maintained within prescribed range  5/4/2025 2249 by Ewa Hart RN  Outcome: Progressing  5/4/2025 1035 by Nancy Villatoro RN  Outcome: Progressing

## 2025-05-05 NOTE — TELEPHONE ENCOUNTER
RN received a call from patient  in regards to wanting to update  that patient is in the hospital for A-fib and PE.  was in clinic with RN and he was informed of this information.       Electronically signed by Eunice Mccollum RN on 5/5/25 at 11:50 AM EDT

## 2025-05-06 ENCOUNTER — ANESTHESIA (OUTPATIENT)
Age: 65
DRG: 308 | End: 2025-05-06
Payer: MEDICARE

## 2025-05-06 ENCOUNTER — ANESTHESIA EVENT (OUTPATIENT)
Age: 65
DRG: 308 | End: 2025-05-06
Payer: MEDICARE

## 2025-05-06 ENCOUNTER — HOSPITAL ENCOUNTER (INPATIENT)
Age: 65
Discharge: HOME | DRG: 308 | End: 2025-05-08
Attending: STUDENT IN AN ORGANIZED HEALTH CARE EDUCATION/TRAINING PROGRAM
Payer: MEDICARE

## 2025-05-06 VITALS
HEART RATE: 92 BPM | OXYGEN SATURATION: 94 % | DIASTOLIC BLOOD PRESSURE: 83 MMHG | SYSTOLIC BLOOD PRESSURE: 126 MMHG | TEMPERATURE: 98.2 F | RESPIRATION RATE: 20 BRPM

## 2025-05-06 LAB
ANION GAP SERPL CALCULATED.3IONS-SCNC: 13 MMOL/L (ref 7–16)
BUN SERPL-MCNC: 11 MG/DL (ref 6–23)
CALCIUM SERPL-MCNC: 9.7 MG/DL (ref 8.6–10.2)
CHLORIDE SERPL-SCNC: 102 MMOL/L (ref 98–107)
CO2 SERPL-SCNC: 24 MMOL/L (ref 22–29)
CREAT SERPL-MCNC: 0.8 MG/DL (ref 0.5–1)
ECHO AO ASC DIAM: 3.5 CM
ECHO AO ASCENDING AORTA INDEX: 1.83 CM/M2
ECHO AO SINUS VALSALVA DIAM: 3.4 CM
ECHO AO SINUS VALSALVA INDEX: 1.78 CM/M2
ECHO BSA: 1.97 M2
ECHO EST RA PRESSURE: 3 MMHG
ECHO LV EF PHYSICIAN: 38 %
ECHO RIGHT VENTRICULAR SYSTOLIC PRESSURE (RVSP): 23 MMHG
ECHO TV REGURGITANT MAX VELOCITY: 2.24 M/S
ECHO TV REGURGITANT PEAK GRADIENT: 20 MMHG
GFR, ESTIMATED: 85 ML/MIN/1.73M2
GLUCOSE SERPL-MCNC: 103 MG/DL (ref 74–99)
MAGNESIUM SERPL-MCNC: 1.7 MG/DL (ref 1.6–2.6)
PARTIAL THROMBOPLASTIN TIME: 100.3 SEC (ref 24.5–35.1)
PARTIAL THROMBOPLASTIN TIME: 130.3 SEC (ref 24.5–35.1)
POTASSIUM SERPL-SCNC: 3.9 MMOL/L (ref 3.5–5)
SODIUM SERPL-SCNC: 139 MMOL/L (ref 132–146)

## 2025-05-06 PROCEDURE — 3700000000 HC ANESTHESIA ATTENDED CARE

## 2025-05-06 PROCEDURE — 93312 ECHO TRANSESOPHAGEAL: CPT | Performed by: INTERNAL MEDICINE

## 2025-05-06 PROCEDURE — 2580000003 HC RX 258

## 2025-05-06 PROCEDURE — 6360000002 HC RX W HCPCS: Performed by: INTERNAL MEDICINE

## 2025-05-06 PROCEDURE — 7100000011 HC PHASE II RECOVERY - ADDTL 15 MIN

## 2025-05-06 PROCEDURE — 2700000000 HC OXYGEN THERAPY PER DAY

## 2025-05-06 PROCEDURE — 93325 DOPPLER ECHO COLOR FLOW MAPG: CPT | Performed by: INTERNAL MEDICINE

## 2025-05-06 PROCEDURE — 6360000002 HC RX W HCPCS

## 2025-05-06 PROCEDURE — 6370000000 HC RX 637 (ALT 250 FOR IP): Performed by: INTERNAL MEDICINE

## 2025-05-06 PROCEDURE — 2580000003 HC RX 258: Performed by: INTERNAL MEDICINE

## 2025-05-06 PROCEDURE — 92960 CARDIOVERSION ELECTRIC EXT: CPT | Performed by: INTERNAL MEDICINE

## 2025-05-06 PROCEDURE — 3700000001 HC ADD 15 MINUTES (ANESTHESIA)

## 2025-05-06 PROCEDURE — 6370000000 HC RX 637 (ALT 250 FOR IP)

## 2025-05-06 PROCEDURE — 85730 THROMBOPLASTIN TIME PARTIAL: CPT

## 2025-05-06 PROCEDURE — 99232 SBSQ HOSP IP/OBS MODERATE 35: CPT | Performed by: INTERNAL MEDICINE

## 2025-05-06 PROCEDURE — 2060000000 HC ICU INTERMEDIATE R&B

## 2025-05-06 PROCEDURE — 99233 SBSQ HOSP IP/OBS HIGH 50: CPT | Performed by: INTERNAL MEDICINE

## 2025-05-06 PROCEDURE — 93320 DOPPLER ECHO COMPLETE: CPT | Performed by: INTERNAL MEDICINE

## 2025-05-06 PROCEDURE — 80048 BASIC METABOLIC PNL TOTAL CA: CPT

## 2025-05-06 PROCEDURE — 83735 ASSAY OF MAGNESIUM: CPT

## 2025-05-06 PROCEDURE — 93005 ELECTROCARDIOGRAM TRACING: CPT | Performed by: INTERNAL MEDICINE

## 2025-05-06 PROCEDURE — 93312 ECHO TRANSESOPHAGEAL: CPT

## 2025-05-06 PROCEDURE — 7100000010 HC PHASE II RECOVERY - FIRST 15 MIN

## 2025-05-06 RX ORDER — PROPOFOL 10 MG/ML
INJECTION, EMULSION INTRAVENOUS
Status: DISCONTINUED | OUTPATIENT
Start: 2025-05-06 | End: 2025-05-06 | Stop reason: SDUPTHER

## 2025-05-06 RX ORDER — SODIUM CHLORIDE 9 MG/ML
INJECTION, SOLUTION INTRAVENOUS
Status: DISCONTINUED | OUTPATIENT
Start: 2025-05-06 | End: 2025-05-06 | Stop reason: SDUPTHER

## 2025-05-06 RX ADMIN — SACUBITRIL AND VALSARTAN 0.5 TABLET: 24; 26 TABLET, FILM COATED ORAL at 20:33

## 2025-05-06 RX ADMIN — AMIODARONE HYDROCHLORIDE 0.5 MG/MIN: 50 INJECTION, SOLUTION INTRAVENOUS at 21:04

## 2025-05-06 RX ADMIN — ONDANSETRON 4 MG: 2 INJECTION, SOLUTION INTRAMUSCULAR; INTRAVENOUS at 06:12

## 2025-05-06 RX ADMIN — SODIUM CHLORIDE: 9 INJECTION, SOLUTION INTRAVENOUS at 13:30

## 2025-05-06 RX ADMIN — ONDANSETRON 4 MG: 2 INJECTION, SOLUTION INTRAMUSCULAR; INTRAVENOUS at 20:28

## 2025-05-06 RX ADMIN — PROPOFOL 160 MG: 10 INJECTION, EMULSION INTRAVENOUS at 14:18

## 2025-05-06 RX ADMIN — DIGOXIN 250 MCG: 0.25 INJECTION INTRAMUSCULAR; INTRAVENOUS at 00:09

## 2025-05-06 RX ADMIN — AMIODARONE HYDROCHLORIDE 1 MG/MIN: 50 INJECTION, SOLUTION INTRAVENOUS at 16:13

## 2025-05-06 RX ADMIN — METOPROLOL SUCCINATE 50 MG: 50 TABLET, EXTENDED RELEASE ORAL at 20:33

## 2025-05-06 RX ADMIN — PRAVASTATIN SODIUM 40 MG: 20 TABLET ORAL at 20:32

## 2025-05-06 RX ADMIN — ALLOPURINOL 300 MG: 300 TABLET ORAL at 20:33

## 2025-05-06 RX ADMIN — FUROSEMIDE 20 MG: 10 INJECTION, SOLUTION INTRAMUSCULAR; INTRAVENOUS at 08:54

## 2025-05-06 RX ADMIN — AMIODARONE HYDROCHLORIDE 150 MG: 50 INJECTION, SOLUTION INTRAVENOUS at 15:54

## 2025-05-06 ASSESSMENT — PAIN - FUNCTIONAL ASSESSMENT
PAIN_FUNCTIONAL_ASSESSMENT: NONE - DENIES PAIN

## 2025-05-06 ASSESSMENT — PAIN SCALES - GENERAL: PAINLEVEL_OUTOF10: 0

## 2025-05-06 ASSESSMENT — LIFESTYLE VARIABLES: SMOKING_STATUS: 1

## 2025-05-06 NOTE — ANESTHESIA PRE PROCEDURE
Department of Anesthesiology  Preprocedure Note       Name:  Roma Dias   Age:  64 y.o.  :  1960                                          MRN:  [id number]         Date:  2025      Surgeon: * No surgeons listed *    Procedure: * No procedures listed *    Medications prior to admission:   Prior to Admission medications    Medication Sig Start Date End Date Taking? Authorizing Provider   docusate sodium (COLACE) 100 MG capsule Take 1 capsule by mouth daily    Joe Latif MD   KEYTRUDA 100 MG/4ML SOLN Inject 8 mLs as directed every 21 days 25   ProviderJoe MD   pravastatin (PRAVACHOL) 40 MG tablet 1 po qd q hs  Patient taking differently: Take 1 tablet by mouth nightly 1 po qd q hs 3/24/25   Lg Figueroa DO   metoprolol tartrate (LOPRESSOR) 50 MG tablet Take 1 tablet by mouth 2 times daily 3/24/25   Lg Figueroa DO   levothyroxine (EUTHYROX) 75 MCG tablet Take 1 tablet by mouth once daily 3/24/25   Lg Figueroa DO   fenofibrate 160 MG tablet 1 po qd  Patient taking differently: Take 1 tablet by mouth nightly 1 po qd 3/24/25   Lg Figueroa DO   DULoxetine (CYMBALTA) 60 MG extended release capsule Take 1 tablet daily 3/24/25   Lg Figueroa DO   amLODIPine (NORVASC) 5 MG tablet Take 1 tablet by mouth daily 3/24/25   Lg Figueroa DO   allopurinol (ZYLOPRIM) 300 MG tablet 1 po qd  Patient taking differently: Take 1 tablet by mouth nightly 1 po qd 3/24/25   Lg Figueroa DO   albuterol sulfate HFA (VENTOLIN HFA) 108 (90 Base) MCG/ACT inhaler Inhale 2 puffs into the lungs every 6 hours as needed for Wheezing 24   Lg Figueroa DO   ondansetron (ZOFRAN-ODT) 4 MG disintegrating tablet Take 1 tablet by mouth 3 times daily as needed for Nausea or Vomiting 2/15/23   Benny Marie DO   nystatin (MYCOSTATIN) 235057 UNIT/GM powder Apply 3 times daily. 22   Shurilla, Opal, PA-C   clobetasol (TEMOVATE) 0.05 % cream Apply topically

## 2025-05-06 NOTE — ANESTHESIA POSTPROCEDURE EVALUATION
Department of Anesthesiology  Postprocedure Note    Patient: Roma Dias  MRN: [id number]  YOB: 1960  Date of evaluation: 5/6/2025    Procedure Summary       Date: 05/06/25 Room / Location: [facility]    Anesthesia Start: 1355 Anesthesia Stop: 1432    Procedure: RICHARD W/ POSSIBLE CARDIOVERSION (PRN CONTRAST/BUBBLE/3D) Diagnosis: Paroxysmal atrial fibrillation (HCC)    Scheduled Providers:  Responsible Provider: Edil Pulido DO    Anesthesia Type: MAC ASA Status: 3            Anesthesia Type: MAC    Jenny Phase I: Jenny Score: 10    Jenny Phase II:      Anesthesia Post Evaluation    Patient location during evaluation: PACU  Patient participation: complete - patient participated  Level of consciousness: awake and alert  Pain score: 0  Airway patency: patent  Nausea & Vomiting: no nausea and no vomiting  Cardiovascular status: hemodynamically stable  Respiratory status: acceptable and nasal cannula  Hydration status: stable  Pain management: adequate        No notable events documented.

## 2025-05-06 NOTE — CARE COORDINATION
Cardiology and Pulmonology are following. Plan for RICHARD w/poss cardioversion today. Currently on 2L O2 baseline is RA. Will require pulse ox testing prior to discharge. Patient is set up for OP sleep study no longer doing home study, [facility] from [facility] updated, he will continue to follow for home oxygen. Discharge plan is home when medically stable.   Cintia RAYN, RN  Case Management

## 2025-05-06 NOTE — PLAN OF CARE
Problem: Discharge Planning  Goal: Discharge to home or other facility with appropriate resources  5/6/2025 0913 by Donato Dunbar RN  Outcome: Progressing  Flowsheets (Taken 5/6/2025 0800)  Discharge to home or other facility with appropriate resources: Identify barriers to discharge with patient and caregiver     Problem: Respiratory - Adult  Goal: Achieves optimal ventilation and oxygenation  5/6/2025 0913 by Donato Dunbar RN  Outcome: Progressing  Flowsheets (Taken 5/6/2025 0800)  Achieves optimal ventilation and oxygenation:   Assess for changes in respiratory status   Assess for changes in mentation and behavior     Problem: Cardiovascular - Adult  Goal: Maintains optimal cardiac output and hemodynamic stability  5/6/2025 0913 by Donato Dunbar RN  Outcome: Progressing  Flowsheets (Taken 5/6/2025 0800)  Maintains optimal cardiac output and hemodynamic stability: Monitor blood pressure and heart rate     Problem: Cardiovascular - Adult  Goal: Absence of cardiac dysrhythmias or at baseline  5/6/2025 0913 by Donato Dunbar RN  Outcome: Progressing  Flowsheets (Taken 5/6/2025 0800)  Absence of cardiac dysrhythmias or at baseline: Monitor cardiac rate and rhythm     Problem: Metabolic/Fluid and Electrolytes - Adult  Goal: Electrolytes maintained within normal limits  5/6/2025 0913 by Donato Dunbar RN  Outcome: Progressing  Flowsheets (Taken 5/6/2025 0800)  Electrolytes maintained within normal limits: Monitor labs and assess patient for signs and symptoms of electrolyte imbalances     Problem: Metabolic/Fluid and Electrolytes - Adult  Goal: Hemodynamic stability and optimal renal function maintained  5/6/2025 0913 by Donato Dunbar RN  Outcome: Progressing  Flowsheets (Taken 5/6/2025 0800)  Hemodynamic stability and optimal renal function maintained: Monitor labs and assess for signs and symptoms of volume excess or deficit     Problem: Metabolic/Fluid and Electrolytes - Adult  Goal: Glucose

## 2025-05-06 NOTE — PROGRESS NOTES
Patient completed day 1 CHF videos.    Video 1: Heart Failure: What is it?  Video 2: Heart Failure: Learn to Recongize symptons  Video 3: Heart Failure: Limiting Sodium  Video 4: Heart Failure: Checking your weight daily     Electronically signed by Giovana Sosa RN on 5/6/2025 at 1:44 PM

## 2025-05-06 NOTE — PROGRESS NOTES
Internal Medicine Progress Note      Synopsis: Patient admitted on 5/1/2025 presented with atrial fibrillation with RVR. She is currently receiving Keytruda for past several weeks for her breast cancer. proBNP 4625. Imaging negative for pulmonary edema. CTA did show a minimal pulmonary embolism in the left upper lobe with no sign of right heart strain and moderate bilateral pleural effusions. Treated in ED with 25 mg p.o. metoprolol and started on heparin drip. Pulmonology and cardiology consulted. Given IV Diuresis. Cardiology recommending RICHARD with cardioversion. Given digoxin loading. Continue Toprol XL 50 mg BID. Patient underwent successful cardioversion. For [facility].       Assessment and Plan     Atrial Fibrillation with RVR  Heparin drip   Toprol XL 50 mg BID  Telemetry  Cardiology following   RICHARD and cardioversion today-successful as per documentation     Acute new onset LV Dysfunction EF 40%  Needs LHC prior to discharge  Consider low dose entresto if patient's blood pressure remains stable       HLD  Continue pravasatin and fenofibrate     Pulmonary embolism   Pulmonology following   Heparin drip  Needs ambulatory pulse 0x prior to discharge.   ECHO with no right heart strain     Triple negative breast cancer   Continue follow up with oncology.           DVT Prophylaxis: Heparin   Disposition: Remains inpatient for LHC     Subjective:     She is somewhat anxious about procedure. She had a lot of good insightful questions.   Still short of breath and fatigued. No chest pain   No NV.  No abdominal pain       Exam:  /88   Pulse 99   Temp 98 °F (36.7 °C) (Oral)   Resp 16   Ht 1.6 m (5' 3\")   Wt 88.5 kg (195 lb 3.2 oz)   SpO2 95%   BMI 34.58 kg/m²   General appearance: No apparent distress, appears stated age and cooperative.  Respiratory:  Normal respiratory effort. Clear to auscultation, bilaterally without Rales/Wheezes/Rhonchi.  Cardiovascular: Regular rate and irregular rhythm with normal S1/S2

## 2025-05-06 NOTE — PLAN OF CARE
Problem: Discharge Planning  Goal: Discharge to home or other facility with appropriate resources  5/5/2025 2140 by Domitila Patel RN  Outcome: Progressing  5/5/2025 1034 by Nancy Villatoro RN  Outcome: Progressing     Problem: Respiratory - Adult  Goal: Achieves optimal ventilation and oxygenation  5/5/2025 2140 by Domitila Patel RN  Outcome: Progressing  5/5/2025 1034 by Nancy Villatoro RN  Outcome: Progressing     Problem: Cardiovascular - Adult  Goal: Maintains optimal cardiac output and hemodynamic stability  5/5/2025 2140 by Domitila Patel RN  Outcome: Progressing  5/5/2025 1034 by Nancy Villatoro RN  Outcome: Progressing  Goal: Absence of cardiac dysrhythmias or at baseline  5/5/2025 2140 by Domitila Patel RN  Outcome: Progressing  5/5/2025 1034 by Nancy Villatoro RN  Outcome: Progressing     Problem: Metabolic/Fluid and Electrolytes - Adult  Goal: Electrolytes maintained within normal limits  5/5/2025 2140 by Domitila Patel RN  Outcome: Progressing  5/5/2025 1034 by Nancy Villatoro RN  Outcome: Progressing  Goal: Hemodynamic stability and optimal renal function maintained  5/5/2025 2140 by Domitila Patel RN  Outcome: Progressing  5/5/2025 1034 by Nancy Villatoro RN  Outcome: Progressing  Goal: Glucose maintained within prescribed range  5/5/2025 2140 by Domitila Patel RN  Outcome: Progressing  5/5/2025 1034 by Nancy Villatoro RN  Outcome: Progressing

## 2025-05-06 NOTE — PROGRESS NOTES
reviewed: As above current blood pressure 102/76 heart rate 88 sats 97%    Labs-BMP normal WBC 3.2 hemoglobin 10.6.  TSH 1 point    CTA chest: Minimal pulmonary embolism with a tiny nonobstructive thrombus involving the posterior aspect of the apicoposterior segment of the left upper lobe and clot burden is minimal.  No signs of pulmonary infarction.  No evidence of RV strain, moderate bilateral pleural effusions larger on the left than the right, mild cardiomegaly, mild pericardial effusion, moderate T8 compression fracture mild T9 T11 compression fractures status post partial fusion involving the T9-T10.    Echocardiogram 5/2/2025:     Left Ventricle: The left ventricle is normal in size at end diastole and mildly dilated in systole with normal thickness of endocardial surfaces.  No regional wall motion abnormalities are identified with global hypokinesis and moderate left ventricular systolic dysfunction.  An estimated ejection fraction of approximately 40% is calculated.  Indeterminate diastolic function is present on the basis of atrial arrhythmias.    Aortic Valve: Aortic valve is tricuspid with mild leaflet thickening and no evidence of aortic stenosis.  Mild aortic insufficiency is present.    Mitral Valve: The mitral valve is structurally normal with moderate centrally directed mitral regurgitation.    Tricuspid Valve: The tricuspid valve is structurally normal with mild tricuspid regurgitation.  A right ventricular systolic pressure of approximately 37 mmHg is calculated suggesting mild pulmonary hypertension.    Left Atrium: The left atrium is mildly dilated with a volume index of 41 ml/m².  The interatrial septum is intact.    Right Atrium: The right atrium is mildly dilated.    Pericardium: A small-moderate circumferential pericardial effusion is identified with no evidence of hemodynamic compromise.       Stress test:  NA      Cardiac catheterization: NA      ASSESSMENT:  Paroxysmal A-fib with RVR rate  is poorly controlled.  Small subsegmental PE without RV strain on IV heparin  New LV dysfunction/cardiomyopathy, EF 40% rule out ischemic etiology, UOP 3.2 L net -8.4 L.  CAD based on coronary calcification.  VHD: Moderate MR, mild TR, mild pulmonary hypertension, RVSP 37 mmHg  Small to moderate circumferential pericardial effusion without evidence of tamponade.  Mild anemia hemoglobin 10.6  Hypokalemia corrected  Mixed hyperlipidemia on pravastatin and fenofibrate.  Hypertension, well-controlled current blood pressure 102/76  Hypothyroidism on HRT  Stage I mild COPD by Gold classification  CA breast on Keytruda      Plan:   Keep n.p.o. and scheduled for a RICHARD guided cardioversion at 2 PM  Continue IV heparin and plan for cardiac cath in the next 24 -48 hours.  Plan for cardiac cath tomorrow based on RICHARD results..  Continue Toprol-XL 50 mg p.o. twice daily for rate control.    Currently on limited GDMT other than metoprolol.  Hemodynamically stable, blood pressure 115/80.  Add Entresto 24/26 mg half tablet p.o. twice daily with holding parameters.  Continue potassium 20 meq p.o.daily  Continue Lasix 20 mg IV daily, check ProBNP in AM  Continue pravastatin 40 mg p.o. daily and fenofibrate 160 mg p.o. daily.      More  than 50 minutes  was spent counseling the patient, reviewing the medications, results, assessment and the rationale for the above recommendations.     Addendum: Underwent successful RICHARD guided cardioversion but went back to AF after she woke up. Will add Amiodarone for rate and rhythm control .    Keep her NPO after midnight and will proceed with cath in AM and convert amiodarone to oral therapy on a short term basis.       NOTE:  This report was transcribed using voice recognition software.  Every effort was made to ensure accuracy; however, inadvertent computerized transcription errors may be present.     April Brady MD., FACC, FASE, FSCMR.  [facility]

## 2025-05-06 NOTE — PROGRESS NOTES
Report called to 6w RN, battery dead in monitor, called RN okay to send without monitor, transport requested.  Lobby notified spouse.      Rn to RN handoff 1500 Amanda P to Isela T

## 2025-05-06 NOTE — PROCEDURES
PROCEDURE NOTE  Date: 5/6/2025   Name: Roma Dias  YOB: 1960    Procedures:  Preprocedure diagnosis:  Paroxysmal atrial fibrillation  Procedures, cardioversion elective arrhythmia external    Preprocedure diagnosis: A. Fib/flutter    Prior tests anticoagulated    Primary procedure  Written informed consent was obtained, the RICHARD was performed under stable fasting condition, self-adhesive anterior-posterior defibrillation pads were applied, the defibrillator was programmed to deliver energy in a synchronized fashion with a EKG. The patient was set up for monitoring of surface EKG and pulse oximetry continuously. Blood pressure was monitored and with automatic cuff measurements. Supplemental oxygen was administered. The procedure was performed under anesthesia by anesthesiology. After ensuring adequate anesthesia, DC CV was performed by delivering 200 J of direct current delivered in a synchronized fashion.     Result: Successful cardioversion to sinus rhythm, confirmed on 12-lead EKG.    Complication: None    Patient was monitored until awake and vitals remained stable.    April Brady M.D.  [facility] cardiology

## 2025-05-07 ENCOUNTER — HOSPITAL ENCOUNTER (INPATIENT)
Age: 65
LOS: 2 days | Discharge: HOME | DRG: 308 | End: 2025-05-09
Attending: STUDENT IN AN ORGANIZED HEALTH CARE EDUCATION/TRAINING PROGRAM | Admitting: INTERNAL MEDICINE
Payer: MEDICARE

## 2025-05-07 ENCOUNTER — HOSPITAL ENCOUNTER (OUTPATIENT)
Age: 65
Setting detail: OUTPATIENT SURGERY
Discharge: ANOTHER HOSPITAL | End: 2025-05-07
Attending: INTERNAL MEDICINE | Admitting: INTERNAL MEDICINE
Payer: MEDICARE

## 2025-05-07 VITALS
RESPIRATION RATE: 18 BRPM | BODY MASS INDEX: 34.59 KG/M2 | TEMPERATURE: 98.5 F | OXYGEN SATURATION: 95 % | HEIGHT: 63 IN | HEART RATE: 62 BPM | DIASTOLIC BLOOD PRESSURE: 74 MMHG | WEIGHT: 195.2 LBS | SYSTOLIC BLOOD PRESSURE: 113 MMHG

## 2025-05-07 VITALS
HEART RATE: 100 BPM | RESPIRATION RATE: 20 BRPM | TEMPERATURE: 96.9 F | SYSTOLIC BLOOD PRESSURE: 102 MMHG | OXYGEN SATURATION: 94 % | DIASTOLIC BLOOD PRESSURE: 74 MMHG

## 2025-05-07 DIAGNOSIS — R07.9 CHEST PAIN, UNSPECIFIED TYPE: ICD-10-CM

## 2025-05-07 DIAGNOSIS — I48.19 PERSISTENT ATRIAL FIBRILLATION (HCC): Primary | ICD-10-CM

## 2025-05-07 PROBLEM — I26.99 PULMONARY EMBOLISM, UNSPECIFIED CHRONICITY, UNSPECIFIED PULMONARY EMBOLISM TYPE, UNSPECIFIED WHETHER ACUTE COR PULMONALE PRESENT (HCC): Status: ACTIVE | Noted: 2025-05-07

## 2025-05-07 LAB
ACTIVATED CLOTTING TIME, LOW RANGE: 178 SEC
ALBUMIN SERPL-MCNC: 3.9 G/DL (ref 3.5–5.2)
ALP SERPL-CCNC: 57 U/L (ref 35–104)
ALT SERPL-CCNC: 23 U/L (ref 0–32)
ANION GAP SERPL CALCULATED.3IONS-SCNC: 12 MMOL/L (ref 7–16)
AST SERPL-CCNC: 24 U/L (ref 0–31)
BILIRUB SERPL-MCNC: 0.5 MG/DL (ref 0–1.2)
BUN SERPL-MCNC: 9 MG/DL (ref 6–23)
CALCIUM SERPL-MCNC: 9.6 MG/DL (ref 8.6–10.2)
CHLORIDE SERPL-SCNC: 102 MMOL/L (ref 98–107)
CO2 SERPL-SCNC: 27 MMOL/L (ref 22–29)
CREAT SERPL-MCNC: 0.8 MG/DL (ref 0.5–1)
EKG ATRIAL RATE: 234 BPM
EKG Q-T INTERVAL: 318 MS
EKG QRS DURATION: 88 MS
EKG QTC CALCULATION (BAZETT): 420 MS
EKG R AXIS: -72 DEGREES
EKG T AXIS: -34 DEGREES
EKG VENTRICULAR RATE: 105 BPM
GFR, ESTIMATED: 85 ML/MIN/1.73M2
GLUCOSE SERPL-MCNC: 165 MG/DL (ref 74–99)
PARTIAL THROMBOPLASTIN TIME: 33.6 SEC (ref 24.5–35.1)
PARTIAL THROMBOPLASTIN TIME: 34.4 SEC (ref 24.5–35.1)
PARTIAL THROMBOPLASTIN TIME: 56.8 SEC (ref 24.5–35.1)
POTASSIUM SERPL-SCNC: 3.6 MMOL/L (ref 3.5–5)
PROT SERPL-MCNC: 6.7 G/DL (ref 6.4–8.3)
SODIUM SERPL-SCNC: 141 MMOL/L (ref 132–146)

## 2025-05-07 PROCEDURE — 80053 COMPREHEN METABOLIC PANEL: CPT

## 2025-05-07 PROCEDURE — 6360000002 HC RX W HCPCS: Performed by: INTERNAL MEDICINE

## 2025-05-07 PROCEDURE — 2060000000 HC ICU INTERMEDIATE R&B

## 2025-05-07 PROCEDURE — 99233 SBSQ HOSP IP/OBS HIGH 50: CPT | Performed by: INTERNAL MEDICINE

## 2025-05-07 PROCEDURE — 6370000000 HC RX 637 (ALT 250 FOR IP): Performed by: INTERNAL MEDICINE

## 2025-05-07 PROCEDURE — 99232 SBSQ HOSP IP/OBS MODERATE 35: CPT | Performed by: INTERNAL MEDICINE

## 2025-05-07 PROCEDURE — B2111ZZ FLUOROSCOPY OF MULTIPLE CORONARY ARTERIES USING LOW OSMOLAR CONTRAST: ICD-10-PCS | Performed by: INTERNAL MEDICINE

## 2025-05-07 PROCEDURE — 93458 L HRT ARTERY/VENTRICLE ANGIO: CPT | Performed by: INTERNAL MEDICINE

## 2025-05-07 PROCEDURE — 7100000011 HC PHASE II RECOVERY - ADDTL 15 MIN: Performed by: INTERNAL MEDICINE

## 2025-05-07 PROCEDURE — C1894 INTRO/SHEATH, NON-LASER: HCPCS | Performed by: INTERNAL MEDICINE

## 2025-05-07 PROCEDURE — 7100000010 HC PHASE II RECOVERY - FIRST 15 MIN: Performed by: INTERNAL MEDICINE

## 2025-05-07 PROCEDURE — 6360000004 HC RX CONTRAST MEDICATION: Performed by: INTERNAL MEDICINE

## 2025-05-07 PROCEDURE — C1769 GUIDE WIRE: HCPCS | Performed by: INTERNAL MEDICINE

## 2025-05-07 PROCEDURE — 85730 THROMBOPLASTIN TIME PARTIAL: CPT

## 2025-05-07 PROCEDURE — 6360000002 HC RX W HCPCS

## 2025-05-07 PROCEDURE — 85347 COAGULATION TIME ACTIVATED: CPT

## 2025-05-07 PROCEDURE — 4A023N7 MEASUREMENT OF CARDIAC SAMPLING AND PRESSURE, LEFT HEART, PERCUTANEOUS APPROACH: ICD-10-PCS | Performed by: INTERNAL MEDICINE

## 2025-05-07 PROCEDURE — 2500000003 HC RX 250 WO HCPCS: Performed by: INTERNAL MEDICINE

## 2025-05-07 PROCEDURE — 2709999900 HC NON-CHARGEABLE SUPPLY: Performed by: INTERNAL MEDICINE

## 2025-05-07 PROCEDURE — 93010 ELECTROCARDIOGRAM REPORT: CPT | Performed by: INTERNAL MEDICINE

## 2025-05-07 PROCEDURE — 36591 DRAW BLOOD OFF VENOUS DEVICE: CPT

## 2025-05-07 RX ORDER — FENOFIBRATE 160 MG/1
160 TABLET ORAL DAILY
Status: DISCONTINUED | OUTPATIENT
Start: 2025-05-07 | End: 2025-05-08

## 2025-05-07 RX ORDER — SODIUM CHLORIDE 9 MG/ML
INJECTION, SOLUTION INTRAVENOUS PRN
Status: CANCELLED | OUTPATIENT
Start: 2025-05-07

## 2025-05-07 RX ORDER — LEVOTHYROXINE SODIUM 75 UG/1
75 TABLET ORAL DAILY
Status: CANCELLED | OUTPATIENT
Start: 2025-05-08

## 2025-05-07 RX ORDER — ACETAMINOPHEN 650 MG/1
650 SUPPOSITORY RECTAL EVERY 6 HOURS PRN
Status: CANCELLED | OUTPATIENT
Start: 2025-05-07

## 2025-05-07 RX ORDER — HEPARIN SODIUM 10000 [USP'U]/100ML
5-30 INJECTION, SOLUTION INTRAVENOUS CONTINUOUS
Status: DISCONTINUED | OUTPATIENT
Start: 2025-05-07 | End: 2025-05-09 | Stop reason: HOSPADM

## 2025-05-07 RX ORDER — SODIUM CHLORIDE 0.9 % (FLUSH) 0.9 %
5-40 SYRINGE (ML) INJECTION EVERY 12 HOURS SCHEDULED
Status: DISCONTINUED | OUTPATIENT
Start: 2025-05-07 | End: 2025-05-07 | Stop reason: SDUPTHER

## 2025-05-07 RX ORDER — HEPARIN SODIUM 10000 [USP'U]/ML
INJECTION, SOLUTION INTRAVENOUS; SUBCUTANEOUS PRN
Status: DISCONTINUED | OUTPATIENT
Start: 2025-05-07 | End: 2025-05-07 | Stop reason: HOSPADM

## 2025-05-07 RX ORDER — ACETAMINOPHEN 325 MG/1
650 TABLET ORAL EVERY 6 HOURS PRN
Status: CANCELLED | OUTPATIENT
Start: 2025-05-07

## 2025-05-07 RX ORDER — SODIUM CHLORIDE 9 MG/ML
INJECTION, SOLUTION INTRAVENOUS CONTINUOUS
Status: DISCONTINUED | OUTPATIENT
Start: 2025-05-07 | End: 2025-05-07 | Stop reason: HOSPADM

## 2025-05-07 RX ORDER — PRAVASTATIN SODIUM 20 MG
40 TABLET ORAL NIGHTLY
Status: DISCONTINUED | OUTPATIENT
Start: 2025-05-07 | End: 2025-05-09 | Stop reason: HOSPADM

## 2025-05-07 RX ORDER — PRAVASTATIN SODIUM 20 MG
40 TABLET ORAL NIGHTLY
Status: CANCELLED | OUTPATIENT
Start: 2025-05-07

## 2025-05-07 RX ORDER — POLYETHYLENE GLYCOL 3350 17 G/17G
17 POWDER, FOR SOLUTION ORAL DAILY PRN
Status: DISCONTINUED | OUTPATIENT
Start: 2025-05-07 | End: 2025-05-09 | Stop reason: HOSPADM

## 2025-05-07 RX ORDER — ONDANSETRON 2 MG/ML
4 INJECTION INTRAMUSCULAR; INTRAVENOUS EVERY 6 HOURS PRN
Status: DISCONTINUED | OUTPATIENT
Start: 2025-05-07 | End: 2025-05-09 | Stop reason: HOSPADM

## 2025-05-07 RX ORDER — ACETAMINOPHEN 325 MG/1
650 TABLET ORAL EVERY 4 HOURS PRN
Status: CANCELLED | OUTPATIENT
Start: 2025-05-07

## 2025-05-07 RX ORDER — MAGNESIUM SULFATE IN WATER 40 MG/ML
2000 INJECTION, SOLUTION INTRAVENOUS PRN
Status: DISCONTINUED | OUTPATIENT
Start: 2025-05-07 | End: 2025-05-09 | Stop reason: HOSPADM

## 2025-05-07 RX ORDER — SODIUM CHLORIDE 0.9 % (FLUSH) 0.9 %
5-40 SYRINGE (ML) INJECTION PRN
Status: CANCELLED | OUTPATIENT
Start: 2025-05-07

## 2025-05-07 RX ORDER — POTASSIUM CHLORIDE 7.45 MG/ML
10 INJECTION INTRAVENOUS PRN
Status: CANCELLED | OUTPATIENT
Start: 2025-05-07

## 2025-05-07 RX ORDER — HEPARIN SODIUM 1000 [USP'U]/ML
4000 INJECTION, SOLUTION INTRAVENOUS; SUBCUTANEOUS PRN
Status: CANCELLED | OUTPATIENT
Start: 2025-05-07

## 2025-05-07 RX ORDER — ALLOPURINOL 300 MG/1
300 TABLET ORAL NIGHTLY
Status: DISCONTINUED | OUTPATIENT
Start: 2025-05-07 | End: 2025-05-09 | Stop reason: HOSPADM

## 2025-05-07 RX ORDER — ONDANSETRON 2 MG/ML
4 INJECTION INTRAMUSCULAR; INTRAVENOUS EVERY 6 HOURS PRN
Status: CANCELLED | OUTPATIENT
Start: 2025-05-07

## 2025-05-07 RX ORDER — DULOXETIN HYDROCHLORIDE 60 MG/1
60 CAPSULE, DELAYED RELEASE ORAL DAILY
Status: CANCELLED | OUTPATIENT
Start: 2025-05-07

## 2025-05-07 RX ORDER — POTASSIUM CHLORIDE 7.45 MG/ML
10 INJECTION INTRAVENOUS PRN
Status: DISCONTINUED | OUTPATIENT
Start: 2025-05-07 | End: 2025-05-09 | Stop reason: HOSPADM

## 2025-05-07 RX ORDER — HEPARIN SODIUM 1000 [USP'U]/ML
4000 INJECTION, SOLUTION INTRAVENOUS; SUBCUTANEOUS PRN
Status: DISCONTINUED | OUTPATIENT
Start: 2025-05-07 | End: 2025-05-09 | Stop reason: HOSPADM

## 2025-05-07 RX ORDER — POLYETHYLENE GLYCOL 3350 17 G/17G
17 POWDER, FOR SOLUTION ORAL DAILY PRN
Status: CANCELLED | OUTPATIENT
Start: 2025-05-07

## 2025-05-07 RX ORDER — HEPARIN SODIUM 1000 [USP'U]/ML
2000 INJECTION, SOLUTION INTRAVENOUS; SUBCUTANEOUS PRN
Status: CANCELLED | OUTPATIENT
Start: 2025-05-07

## 2025-05-07 RX ORDER — ASPIRIN 325 MG
TABLET ORAL PRN
Status: DISCONTINUED | OUTPATIENT
Start: 2025-05-07 | End: 2025-05-07 | Stop reason: HOSPADM

## 2025-05-07 RX ORDER — IOPAMIDOL 755 MG/ML
INJECTION, SOLUTION INTRAVASCULAR PRN
Status: DISCONTINUED | OUTPATIENT
Start: 2025-05-07 | End: 2025-05-07 | Stop reason: HOSPADM

## 2025-05-07 RX ORDER — FENTANYL CITRATE 50 UG/ML
INJECTION, SOLUTION INTRAMUSCULAR; INTRAVENOUS PRN
Status: DISCONTINUED | OUTPATIENT
Start: 2025-05-07 | End: 2025-05-07 | Stop reason: HOSPADM

## 2025-05-07 RX ORDER — HEPARIN SODIUM 1000 [USP'U]/ML
2000 INJECTION, SOLUTION INTRAVENOUS; SUBCUTANEOUS PRN
Status: DISCONTINUED | OUTPATIENT
Start: 2025-05-07 | End: 2025-05-09 | Stop reason: HOSPADM

## 2025-05-07 RX ORDER — ACETAMINOPHEN 325 MG/1
650 TABLET ORAL EVERY 6 HOURS PRN
Status: DISCONTINUED | OUTPATIENT
Start: 2025-05-07 | End: 2025-05-09 | Stop reason: HOSPADM

## 2025-05-07 RX ORDER — ALLOPURINOL 300 MG/1
300 TABLET ORAL NIGHTLY
Status: CANCELLED | OUTPATIENT
Start: 2025-05-07

## 2025-05-07 RX ORDER — MIDAZOLAM HYDROCHLORIDE 1 MG/ML
INJECTION, SOLUTION INTRAMUSCULAR; INTRAVENOUS PRN
Status: DISCONTINUED | OUTPATIENT
Start: 2025-05-07 | End: 2025-05-07 | Stop reason: HOSPADM

## 2025-05-07 RX ORDER — LEVOTHYROXINE SODIUM 75 UG/1
75 TABLET ORAL DAILY
Status: DISCONTINUED | OUTPATIENT
Start: 2025-05-08 | End: 2025-05-09 | Stop reason: HOSPADM

## 2025-05-07 RX ORDER — SODIUM CHLORIDE 0.9 % (FLUSH) 0.9 %
5-40 SYRINGE (ML) INJECTION EVERY 12 HOURS SCHEDULED
Status: CANCELLED | OUTPATIENT
Start: 2025-05-07

## 2025-05-07 RX ORDER — DOCUSATE SODIUM 100 MG/1
100 CAPSULE, LIQUID FILLED ORAL DAILY
Status: DISCONTINUED | OUTPATIENT
Start: 2025-05-07 | End: 2025-05-09 | Stop reason: HOSPADM

## 2025-05-07 RX ORDER — ACETAMINOPHEN 325 MG/1
650 TABLET ORAL EVERY 4 HOURS PRN
Status: DISCONTINUED | OUTPATIENT
Start: 2025-05-07 | End: 2025-05-09 | Stop reason: SDUPTHER

## 2025-05-07 RX ORDER — FENOFIBRATE 160 MG/1
160 TABLET ORAL DAILY
Status: CANCELLED | OUTPATIENT
Start: 2025-05-07

## 2025-05-07 RX ORDER — SODIUM CHLORIDE 0.9 % (FLUSH) 0.9 %
5-40 SYRINGE (ML) INJECTION PRN
Status: DISCONTINUED | OUTPATIENT
Start: 2025-05-07 | End: 2025-05-07 | Stop reason: SDUPTHER

## 2025-05-07 RX ORDER — HEPARIN SODIUM 10000 [USP'U]/100ML
5-30 INJECTION, SOLUTION INTRAVENOUS CONTINUOUS
Status: CANCELLED | OUTPATIENT
Start: 2025-05-07

## 2025-05-07 RX ORDER — SILVER SULFADIAZINE 10 MG/G
CREAM TOPICAL DAILY
Status: DISCONTINUED | OUTPATIENT
Start: 2025-05-07 | End: 2025-05-09 | Stop reason: HOSPADM

## 2025-05-07 RX ORDER — METOPROLOL SUCCINATE 50 MG/1
50 TABLET, EXTENDED RELEASE ORAL 2 TIMES DAILY
Status: CANCELLED | OUTPATIENT
Start: 2025-05-07

## 2025-05-07 RX ORDER — SODIUM CHLORIDE 9 MG/ML
INJECTION, SOLUTION INTRAVENOUS PRN
Status: DISCONTINUED | OUTPATIENT
Start: 2025-05-07 | End: 2025-05-07 | Stop reason: SDUPTHER

## 2025-05-07 RX ORDER — POTASSIUM CHLORIDE 1500 MG/1
40 TABLET, EXTENDED RELEASE ORAL PRN
Status: DISCONTINUED | OUTPATIENT
Start: 2025-05-07 | End: 2025-05-09 | Stop reason: HOSPADM

## 2025-05-07 RX ORDER — SODIUM CHLORIDE 0.9 % (FLUSH) 0.9 %
5-40 SYRINGE (ML) INJECTION PRN
Status: DISCONTINUED | OUTPATIENT
Start: 2025-05-07 | End: 2025-05-09 | Stop reason: HOSPADM

## 2025-05-07 RX ORDER — MAGNESIUM SULFATE IN WATER 40 MG/ML
2000 INJECTION, SOLUTION INTRAVENOUS PRN
Status: CANCELLED | OUTPATIENT
Start: 2025-05-07

## 2025-05-07 RX ORDER — DOCUSATE SODIUM 100 MG/1
100 CAPSULE, LIQUID FILLED ORAL DAILY
Status: CANCELLED | OUTPATIENT
Start: 2025-05-07

## 2025-05-07 RX ORDER — DULOXETIN HYDROCHLORIDE 60 MG/1
60 CAPSULE, DELAYED RELEASE ORAL DAILY
Status: DISCONTINUED | OUTPATIENT
Start: 2025-05-07 | End: 2025-05-09 | Stop reason: HOSPADM

## 2025-05-07 RX ORDER — METOPROLOL SUCCINATE 50 MG/1
50 TABLET, EXTENDED RELEASE ORAL 2 TIMES DAILY
Status: DISCONTINUED | OUTPATIENT
Start: 2025-05-07 | End: 2025-05-09 | Stop reason: HOSPADM

## 2025-05-07 RX ORDER — SODIUM CHLORIDE 9 MG/ML
INJECTION, SOLUTION INTRAVENOUS PRN
Status: DISCONTINUED | OUTPATIENT
Start: 2025-05-07 | End: 2025-05-09 | Stop reason: HOSPADM

## 2025-05-07 RX ORDER — ACETAMINOPHEN 650 MG/1
650 SUPPOSITORY RECTAL EVERY 6 HOURS PRN
Status: DISCONTINUED | OUTPATIENT
Start: 2025-05-07 | End: 2025-05-09 | Stop reason: HOSPADM

## 2025-05-07 RX ORDER — SODIUM CHLORIDE 0.9 % (FLUSH) 0.9 %
5-40 SYRINGE (ML) INJECTION EVERY 12 HOURS SCHEDULED
Status: DISCONTINUED | OUTPATIENT
Start: 2025-05-07 | End: 2025-05-09 | Stop reason: HOSPADM

## 2025-05-07 RX ORDER — POTASSIUM CHLORIDE 1500 MG/1
40 TABLET, EXTENDED RELEASE ORAL PRN
Status: CANCELLED | OUTPATIENT
Start: 2025-05-07

## 2025-05-07 RX ADMIN — ONDANSETRON 4 MG: 2 INJECTION, SOLUTION INTRAMUSCULAR; INTRAVENOUS at 05:41

## 2025-05-07 RX ADMIN — SILVER SULFADIAZINE: 10 CREAM TOPICAL at 14:36

## 2025-05-07 RX ADMIN — METOPROLOL SUCCINATE 50 MG: 50 TABLET, EXTENDED RELEASE ORAL at 21:17

## 2025-05-07 RX ADMIN — HEPARIN SODIUM 4000 UNITS: 1000 INJECTION INTRAVENOUS; SUBCUTANEOUS at 02:47

## 2025-05-07 RX ADMIN — DOCUSATE SODIUM 100 MG: 100 CAPSULE, LIQUID FILLED ORAL at 14:36

## 2025-05-07 RX ADMIN — SACUBITRIL AND VALSARTAN 0.5 TABLET: 24; 26 TABLET, FILM COATED ORAL at 21:16

## 2025-05-07 RX ADMIN — ALLOPURINOL 300 MG: 300 TABLET ORAL at 21:16

## 2025-05-07 RX ADMIN — Medication 0.5 MG/MIN: at 14:35

## 2025-05-07 RX ADMIN — FENOFIBRATE 160 MG: 160 TABLET ORAL at 14:36

## 2025-05-07 RX ADMIN — HEPARIN SODIUM 4000 UNITS: 1000 INJECTION INTRAVENOUS; SUBCUTANEOUS at 22:59

## 2025-05-07 RX ADMIN — SODIUM CHLORIDE, PRESERVATIVE FREE 10 ML: 5 INJECTION INTRAVENOUS at 21:18

## 2025-05-07 RX ADMIN — PRAVASTATIN SODIUM 40 MG: 20 TABLET ORAL at 21:33

## 2025-05-07 RX ADMIN — DULOXETINE 60 MG: 60 CAPSULE, DELAYED RELEASE ORAL at 14:36

## 2025-05-07 NOTE — PROGRESS NOTES
INPATIENT CARDIOLOGY FOLLOW-UP    Name: Roma Dias    Age: 64 y.o.    Date of Admission: 5/7/2025 12:43 PM    Date of Service: 5/7/2025    Chief Complaint: Follow-up for paroxysmal atrial fibrillation, carcinoma of the breast, hypertension, pulmonary embolism and pericardial effusion.    Interim History:  No new overnight cardiac complaints.  Patient is n.p.o. and scheduled for RICHARD guided cardioversion at 2 PM today.  Currently with no complaints of CP, SOB, palpitations, dizziness, or lightheadedness.  Currently patient on Keytruda every 3 weeks.  Atrial fibrillation with intermittent RVR on telemetry.    Review of Systems:   Cardiac: As per HPI  General: No fever, chills  Pulmonary: As per HPI  HEENT: No visual disturbances, difficult swallowing  GI: No nausea, vomiting  Endocrine: No thyroid disease or DM  Musculoskeletal: FERNÁNDEZ x 4, no focal motor deficits  Skin: Intact, no rashes  Neuro/Psych: No headache or seizures    Problem List:  Patient Active Problem List   Diagnosis    Chest pain    Chronic depression    Coronary artery calcification    Primary hypertension    Obstructive sleep apnea    Acquired hypothyroidism    Moderate obesity    Chronic obstructive pulmonary disease (HCC)    Cigarette nicotine dependence without complication    Triple negative breast cancer (HCC) [C50.919]    Atrial fibrillation (HCC)    Single subsegmental pulmonary embolism without acute cor pulmonale (HCC)    Carcinoma of right female breast (HCC)    Pure hypercholesterolemia    Pericardial effusion    Pulmonary embolism, unspecified chronicity, unspecified pulmonary embolism type, unspecified whether acute cor pulmonale present (HCC)       Allergies:  Allergies   Allergen Reactions    Biaxin [Clarithromycin] Hallucinations     psychosis    Levofloxacin Other (See Comments)     Patient unsure reaction       Current Medications:  Current Facility-Administered Medications   Medication Dose Route Frequency Provider Last Rate  Last Admin    0.9 % sodium chloride infusion   IntraVENous PRN Peng Tysonia, DO        acetaminophen (TYLENOL) tablet 650 mg  650 mg Oral Q6H PRN Peng Tysonia, DO        Or    acetaminophen (TYLENOL) suppository 650 mg  650 mg Rectal Q6H PRN Jah, Gabriela, DO        heparin (porcine) injection 2,000 Units  2,000 Units IntraVENous PRN Germaine Tysonricia, DO        heparin (porcine) injection 4,000 Units  4,000 Units IntraVENous PRN Peng Tysonia, DO        heparin 25,000 units in dextrose 5% 250 mL (premix) infusion  5-30 Units/kg/hr IntraVENous Continuous Peng Tysonia, DO 8.8 mL/hr at 05/07/25 1623 10 Units/kg/hr at 05/07/25 1623    magnesium sulfate 2000 mg in 50 mL IVPB premix  2,000 mg IntraVENous PRN Jah, Gabriela, DO        polyethylene glycol (GLYCOLAX) packet 17 g  17 g Oral Daily PRN Peng Tysonia, DO        potassium chloride (KLOR-CON M) extended release tablet 40 mEq  40 mEq Oral PRN Peng Tysonia, DO        Or    potassium bicarb-citric acid (EFFER-K) effervescent tablet 40 mEq  40 mEq Oral PRN Peng Tysonia, DO        Or    potassium chloride 10 mEq/100 mL IVPB (Peripheral Line)  10 mEq IntraVENous PRN Peng Tysonia, DO        sodium chloride flush 0.9 % injection 5-40 mL  5-40 mL IntraVENous 2 times per day Gabriela Tyson,         sodium chloride flush 0.9 % injection 5-40 mL  5-40 mL IntraVENous PRN Peng Tysonia, DO        allopurinol (ZYLOPRIM) tablet 300 mg  300 mg Oral Nightly Peng Tysonia, DO        docusate sodium (COLACE) capsule 100 mg  100 mg Oral Daily Peng Tysonia, DO   100 mg at 05/07/25 1436    DULoxetine (CYMBALTA) extended release capsule 60 mg  60 mg Oral Daily Gabriela Tyson, DO   60 mg at 05/07/25 1436    fenofibrate tablet 160 mg  160 mg Oral Daily Peng Tysonia, DO   160 mg at 05/07/25 1436    [START ON 5/8/2025] levothyroxine (SYNTHROID) tablet 75 mcg  75 mcg Oral Daily Jah

## 2025-05-07 NOTE — PROGRESS NOTES
Sleep study order [facility]ed to [facility].    Electronically signed by Giovana Sosa RN on 5/7/2025 at 2:28 PM

## 2025-05-07 NOTE — DISCHARGE SUMMARY
Hospital Medicine Discharge Summary    Patient ID: Roma Dias      Patient's PCP: Lg Figueroa DO    Admit Date: 5/1/2025     Discharge Date: 5/7/2025      Admitting Physician: Donny Serna MD     Discharge Physician: Gabriela Tyson DO     Discharge Diagnoses:       Active Hospital Problems    Diagnosis Date Noted    Single subsegmental pulmonary embolism without acute cor pulmonale (HCC) [I26.93] 05/02/2025    Carcinoma of right female breast (HCC) [C50.911] 05/02/2025    Pure hypercholesterolemia [E78.00] 05/02/2025    Pericardial effusion [I31.39] 05/02/2025    Atrial fibrillation (HCC) [I48.91] 05/01/2025    Triple negative breast cancer (HCC) [C50.919] [C50.919, Z17.421] 08/09/2024    Chronic obstructive pulmonary disease (HCC) [J44.9] 06/04/2018    Primary hypertension [I10] 12/27/2016    Acquired hypothyroidism [E03.9] 12/27/2016    Obstructive sleep apnea [G47.33] 12/27/2016    Moderate obesity [E66.9] 12/27/2016    Coronary artery calcification [I25.10] 06/27/2016       The patient was seen and examined on day of discharge and this discharge summary is in conjunction with any daily progress note from day of discharge.    Hospital Course:     Patient admitted on 5/1/2025 presented with atrial fibrillation with RVR. She is currently receiving Keytruda for past several weeks for her breast cancer. proBNP 4625. Imaging negative for pulmonary edema. CTA did show a minimal pulmonary embolism in the left upper lobe with no sign of right heart strain and moderate bilateral pleural effusions. Treated in ED with 25 mg p.o. metoprolol and started on heparin drip. Pulmonology and cardiology consulted. Given IV Diuresis. Cardiology recommending RICHARD with cardioversion. Given digoxin loading. Continue Toprol XL 50 mg BID. Patient underwent successful cardioversion. She is ib amiodarone drip. She will be discharged to [facility] for LHC. If no intervention, she will return back to

## 2025-05-07 NOTE — CARE COORDINATION
Patient was a discharge/readmit. Transferred to [facility] for s/p left heart cath today, returned to [facility]. Currently on 3L O2 baseline is RA. Will require pulse ox testing prior to discharge. Patient is set up for OP sleep study no longer doing home study, [facility] from [facility] updated, he will continue to follow for home oxygen. Discharge plan is home when medically stable.   Cintia TSE, RN  Case Management

## 2025-05-07 NOTE — H&P
Hospitalist History & Physical      PCP: Lg Figueroa DO    Date of Admission: 5/7/2025    Date of Service: Pt seen/examined on 5/7/2025 and is admitted to Inpatient with expected LOS greater than two midnights due to medical therapy.    Chief Complaint:  had no chief complaint listed for this encounter.    History Of Present Illness:    Ms. Roma Dias, a 64 y.o. year old female  who  has a past medical history of Angina, Anxiety attack, Arrhythmia, Atrial fibrillation with RVR ([facility]), CAD (coronary artery disease), CAD (coronary artery disease), Cancer (HCC), Chronic depression, Chronic obstructive pulmonary disease (HCC), Fatigue, History of blood transfusion, Hx of blood clots, Hyperlipidemia, Hypertension, Hypothyroidism, Nephrolithiasis, Obesity (BMI 35.0-39.9 without comorbidity), Osteoarthritis, PE (pulmonary thromboembolism) ([facility]), Sleep apnea, Stage 1 mild COPD by GOLD classification ([facility]), Thyroid disease, and Uncontrolled daytime somnolence.       Patient admitted on 5/1/2025 presented with atrial fibrillation with RVR. She is currently receiving Keytruda for past several weeks for her breast cancer. proBNP 4625. Imaging negative for pulmonary edema. CTA did show a minimal pulmonary embolism in the left upper lobe with no sign of right heart strain and moderate bilateral pleural effusions. Treated in ED with 25 mg p.o. metoprolol and started on heparin drip. Pulmonology and cardiology consulted. Given IV Diuresis. Cardiology recommending RICHARD with cardioversion. Given digoxin loading. Continue Toprol XL 50 mg BID. Patient underwent successful cardioversion. She is ib amiodarone drip. She will be discharged to [facility] for LHC. If no intervention, she will return back to our facility.     Patient underwent LHC, no interventions performed. Returned to [facility].     Past Medical History:        Diagnosis Date    Angina     Anxiety attack     Arrhythmia      transcribed using voice recognition software. Every effort was made to ensure accuracy; however, inadvertent computerized transcription errors may be present.

## 2025-05-07 NOTE — PROGRESS NOTES
Report given to physicians ambulance at bedside. Discharge instructions provided to give to Lynne floor RN

## 2025-05-07 NOTE — H&P
Patient was transferred from [facility] by Dr. Brady to undergo left heart catheterization due to paroxysmal atrial fibrillation, status post electrical cardioversion, mild systolic dysfunction, and coronary calcification on chest CT.  H&P reviewed.  Patient seen and examined.  No changes.

## 2025-05-08 ENCOUNTER — ANESTHESIA (OUTPATIENT)
Dept: INPATIENT UNIT | Age: 65
DRG: 308 | End: 2025-05-08
Payer: MEDICARE

## 2025-05-08 ENCOUNTER — ANESTHESIA EVENT (OUTPATIENT)
Dept: INPATIENT UNIT | Age: 65
DRG: 308 | End: 2025-05-08
Payer: MEDICARE

## 2025-05-08 ENCOUNTER — APPOINTMENT (OUTPATIENT)
Dept: INPATIENT UNIT | Age: 65
DRG: 308 | End: 2025-05-08
Attending: STUDENT IN AN ORGANIZED HEALTH CARE EDUCATION/TRAINING PROGRAM
Payer: MEDICARE

## 2025-05-08 LAB
ANION GAP SERPL CALCULATED.3IONS-SCNC: 11 MMOL/L (ref 7–16)
BASOPHILS # BLD: 0.03 K/UL (ref 0–0.2)
BASOPHILS NFR BLD: 1 % (ref 0–2)
BNP SERPL-MCNC: 1412 PG/ML (ref 0–125)
BUN SERPL-MCNC: 9 MG/DL (ref 6–23)
CALCIUM SERPL-MCNC: 9.2 MG/DL (ref 8.6–10.2)
CHLORIDE SERPL-SCNC: 103 MMOL/L (ref 98–107)
CO2 SERPL-SCNC: 27 MMOL/L (ref 22–29)
CREAT SERPL-MCNC: 0.8 MG/DL (ref 0.5–1)
EKG ATRIAL RATE: 75 BPM
EKG P AXIS: 51 DEGREES
EKG P-R INTERVAL: 186 MS
EKG Q-T INTERVAL: 414 MS
EKG QRS DURATION: 92 MS
EKG QTC CALCULATION (BAZETT): 462 MS
EKG R AXIS: -58 DEGREES
EKG T AXIS: -6 DEGREES
EKG VENTRICULAR RATE: 75 BPM
EOSINOPHIL # BLD: 0.1 K/UL (ref 0.05–0.5)
EOSINOPHILS RELATIVE PERCENT: 3 % (ref 0–6)
ERYTHROCYTE [DISTWIDTH] IN BLOOD BY AUTOMATED COUNT: 13.6 % (ref 11.5–15)
GFR, ESTIMATED: 85 ML/MIN/1.73M2
GLUCOSE SERPL-MCNC: 127 MG/DL (ref 74–99)
HCT VFR BLD AUTO: 36.6 % (ref 34–48)
HGB BLD-MCNC: 11.7 G/DL (ref 11.5–15.5)
IMM GRANULOCYTES # BLD AUTO: <0.03 K/UL (ref 0–0.58)
IMM GRANULOCYTES NFR BLD: 0 % (ref 0–5)
LYMPHOCYTES NFR BLD: 1.49 K/UL (ref 1.5–4)
LYMPHOCYTES RELATIVE PERCENT: 37 % (ref 20–42)
MAGNESIUM SERPL-MCNC: 1.8 MG/DL (ref 1.6–2.6)
MCH RBC QN AUTO: 29.7 PG (ref 26–35)
MCHC RBC AUTO-ENTMCNC: 32 G/DL (ref 32–34.5)
MCV RBC AUTO: 92.9 FL (ref 80–99.9)
MONOCYTES NFR BLD: 0.39 K/UL (ref 0.1–0.95)
MONOCYTES NFR BLD: 10 % (ref 2–12)
NEUTROPHILS NFR BLD: 50 % (ref 43–80)
NEUTS SEG NFR BLD: 1.99 K/UL (ref 1.8–7.3)
PARTIAL THROMBOPLASTIN TIME: 39.1 SEC (ref 24.5–35.1)
PARTIAL THROMBOPLASTIN TIME: 80.1 SEC (ref 24.5–35.1)
PARTIAL THROMBOPLASTIN TIME: 95.3 SEC (ref 24.5–35.1)
PLATELET CONFIRMATION: NORMAL
PLATELET, FLUORESCENCE: 151 K/UL (ref 130–450)
PMV BLD AUTO: 13 FL (ref 7–12)
POTASSIUM SERPL-SCNC: 3.8 MMOL/L (ref 3.5–5)
RBC # BLD AUTO: 3.94 M/UL (ref 3.5–5.5)
SODIUM SERPL-SCNC: 141 MMOL/L (ref 132–146)
WBC OTHER # BLD: 4 K/UL (ref 4.5–11.5)

## 2025-05-08 PROCEDURE — 85025 COMPLETE CBC W/AUTO DIFF WBC: CPT

## 2025-05-08 PROCEDURE — 83880 ASSAY OF NATRIURETIC PEPTIDE: CPT

## 2025-05-08 PROCEDURE — 6360000002 HC RX W HCPCS

## 2025-05-08 PROCEDURE — 6370000000 HC RX 637 (ALT 250 FOR IP): Performed by: INTERNAL MEDICINE

## 2025-05-08 PROCEDURE — 2500000003 HC RX 250 WO HCPCS: Performed by: INTERNAL MEDICINE

## 2025-05-08 PROCEDURE — 3700000001 HC ADD 15 MINUTES (ANESTHESIA): Performed by: ANESTHESIOLOGY

## 2025-05-08 PROCEDURE — 7100000011 HC PHASE II RECOVERY - ADDTL 15 MIN: Performed by: ANESTHESIOLOGY

## 2025-05-08 PROCEDURE — 99233 SBSQ HOSP IP/OBS HIGH 50: CPT | Performed by: INTERNAL MEDICINE

## 2025-05-08 PROCEDURE — 93010 ELECTROCARDIOGRAM REPORT: CPT | Performed by: INTERNAL MEDICINE

## 2025-05-08 PROCEDURE — 7100000010 HC PHASE II RECOVERY - FIRST 15 MIN: Performed by: ANESTHESIOLOGY

## 2025-05-08 PROCEDURE — 85730 THROMBOPLASTIN TIME PARTIAL: CPT

## 2025-05-08 PROCEDURE — 2060000000 HC ICU INTERMEDIATE R&B

## 2025-05-08 PROCEDURE — 2580000003 HC RX 258

## 2025-05-08 PROCEDURE — 92960 CARDIOVERSION ELECTRIC EXT: CPT | Performed by: ANESTHESIOLOGY

## 2025-05-08 PROCEDURE — 80048 BASIC METABOLIC PNL TOTAL CA: CPT

## 2025-05-08 PROCEDURE — 93005 ELECTROCARDIOGRAM TRACING: CPT | Performed by: INTERNAL MEDICINE

## 2025-05-08 PROCEDURE — 6360000002 HC RX W HCPCS: Performed by: INTERNAL MEDICINE

## 2025-05-08 PROCEDURE — 83735 ASSAY OF MAGNESIUM: CPT

## 2025-05-08 PROCEDURE — 99232 SBSQ HOSP IP/OBS MODERATE 35: CPT | Performed by: STUDENT IN AN ORGANIZED HEALTH CARE EDUCATION/TRAINING PROGRAM

## 2025-05-08 PROCEDURE — 6370000000 HC RX 637 (ALT 250 FOR IP): Performed by: STUDENT IN AN ORGANIZED HEALTH CARE EDUCATION/TRAINING PROGRAM

## 2025-05-08 PROCEDURE — 3700000000 HC ANESTHESIA ATTENDED CARE: Performed by: ANESTHESIOLOGY

## 2025-05-08 RX ORDER — AMIODARONE HYDROCHLORIDE 200 MG/1
200 TABLET ORAL DAILY
Status: DISCONTINUED | OUTPATIENT
Start: 2025-05-23 | End: 2025-05-09 | Stop reason: HOSPADM

## 2025-05-08 RX ORDER — SODIUM CHLORIDE 9 MG/ML
INJECTION, SOLUTION INTRAVENOUS
Status: DISCONTINUED | OUTPATIENT
Start: 2025-05-08 | End: 2025-05-08 | Stop reason: SDUPTHER

## 2025-05-08 RX ORDER — FENOFIBRATE 54 MG/1
54 TABLET ORAL DAILY
Status: DISCONTINUED | OUTPATIENT
Start: 2025-05-08 | End: 2025-05-09 | Stop reason: HOSPADM

## 2025-05-08 RX ORDER — PROPOFOL 10 MG/ML
INJECTION, EMULSION INTRAVENOUS
Status: DISCONTINUED | OUTPATIENT
Start: 2025-05-08 | End: 2025-05-08 | Stop reason: SDUPTHER

## 2025-05-08 RX ORDER — AMIODARONE HYDROCHLORIDE 200 MG/1
200 TABLET ORAL 2 TIMES DAILY
Status: DISCONTINUED | OUTPATIENT
Start: 2025-05-08 | End: 2025-05-09 | Stop reason: HOSPADM

## 2025-05-08 RX ADMIN — Medication 0.5 MG/MIN: at 03:08

## 2025-05-08 RX ADMIN — EMPAGLIFLOZIN 10 MG: 10 TABLET, FILM COATED ORAL at 23:01

## 2025-05-08 RX ADMIN — SACUBITRIL AND VALSARTAN 0.5 TABLET: 24; 26 TABLET, FILM COATED ORAL at 08:42

## 2025-05-08 RX ADMIN — HEPARIN SODIUM 14 UNITS/KG/HR: 10000 INJECTION, SOLUTION INTRAVENOUS at 04:52

## 2025-05-08 RX ADMIN — SODIUM CHLORIDE, PRESERVATIVE FREE 10 ML: 5 INJECTION INTRAVENOUS at 21:27

## 2025-05-08 RX ADMIN — ALLOPURINOL 300 MG: 300 TABLET ORAL at 21:23

## 2025-05-08 RX ADMIN — PROPOFOL 80 MG: 10 INJECTION, EMULSION INTRAVENOUS at 13:48

## 2025-05-08 RX ADMIN — SODIUM CHLORIDE: 9 INJECTION, SOLUTION INTRAVENOUS at 13:15

## 2025-05-08 RX ADMIN — METOPROLOL SUCCINATE 50 MG: 50 TABLET, EXTENDED RELEASE ORAL at 21:23

## 2025-05-08 RX ADMIN — ONDANSETRON 4 MG: 2 INJECTION, SOLUTION INTRAMUSCULAR; INTRAVENOUS at 03:18

## 2025-05-08 RX ADMIN — SILVER SULFADIAZINE: 10 CREAM TOPICAL at 08:45

## 2025-05-08 RX ADMIN — DULOXETINE 60 MG: 60 CAPSULE, DELAYED RELEASE ORAL at 08:42

## 2025-05-08 RX ADMIN — FENOFIBRATE 54 MG: 160 TABLET ORAL at 08:42

## 2025-05-08 RX ADMIN — DOCUSATE SODIUM 100 MG: 100 CAPSULE, LIQUID FILLED ORAL at 08:42

## 2025-05-08 RX ADMIN — PRAVASTATIN SODIUM 40 MG: 20 TABLET ORAL at 21:23

## 2025-05-08 RX ADMIN — AMIODARONE HYDROCHLORIDE 200 MG: 200 TABLET ORAL at 21:23

## 2025-05-08 RX ADMIN — HEPARIN SODIUM 2000 UNITS: 1000 INJECTION INTRAVENOUS; SUBCUTANEOUS at 22:59

## 2025-05-08 RX ADMIN — LEVOTHYROXINE SODIUM 75 MCG: 0.07 TABLET ORAL at 06:43

## 2025-05-08 RX ADMIN — METOPROLOL SUCCINATE 50 MG: 50 TABLET, EXTENDED RELEASE ORAL at 08:42

## 2025-05-08 RX ADMIN — SACUBITRIL AND VALSARTAN 0.5 TABLET: 24; 26 TABLET, FILM COATED ORAL at 21:23

## 2025-05-08 ASSESSMENT — PAIN SCALES - GENERAL: PAINLEVEL_OUTOF10: 0

## 2025-05-08 ASSESSMENT — LIFESTYLE VARIABLES: SMOKING_STATUS: 1

## 2025-05-08 ASSESSMENT — PAIN - FUNCTIONAL ASSESSMENT: PAIN_FUNCTIONAL_ASSESSMENT: 0-10

## 2025-05-08 NOTE — ANESTHESIA POSTPROCEDURE EVALUATION
Department of Anesthesiology  Postprocedure Note    Patient: Roma Dias  MRN: [id number]  YOB: 1960  Date of evaluation: 5/8/2025    Procedure Summary       Date: 05/08/25 Room / Location: [facility] Stage I    Anesthesia Start: 1323 Anesthesia Stop: 1355    Procedure: CARDIOVERSION Diagnosis:     Scheduled Providers:  Responsible Provider: Adriana Perez MD    Anesthesia Type: MAC ASA Status: 3            Anesthesia Type: MAC    Jenny Phase I: Jenny Score: 9    Jenny Phase II:      Anesthesia Post Evaluation    Patient location during evaluation: PACU  Patient participation: complete - patient participated  Level of consciousness: awake and alert  Pain score: 0  Airway patency: patent  Nausea & Vomiting: no nausea and no vomiting  Cardiovascular status: hemodynamically stable  Respiratory status: acceptable, room air and spontaneous ventilation  Hydration status: stable  Pain management: adequate        No notable events documented.   NOTIFICATION RETURN TO WORK / SCHOOL    1/18/2022 12:58 PM    [phone]      To Whom It May Concern:    Eva Ayoub is currently under the care of [facility]. She will return to work/school on: 01/18/2022    If there are questions or concerns please have the patient contact our office.         Sincerely,      Claudell Certain, MD

## 2025-05-08 NOTE — CARE COORDINATION
Cardiology and Pulmonology are following. Plan for external cardioversion today. Currently on RA. Will require pulse ox testing prior to discharge. Discharge plan is home when medically stable.   Cintia RAYN, RN  Case Management

## 2025-05-08 NOTE — PROGRESS NOTES
Pulse ox was 99% on room air at rest.  Ambulated patient on room air.  Oxygen saturation was 98% on room air while ambulating.

## 2025-05-08 NOTE — ANESTHESIA PRE PROCEDURE
Department of Anesthesiology  Preprocedure Note       Name:  Roma Dias   Age:  64 y.o.  :  1960                                          MRN:  [id number]         Date:  2025      Surgeon: * No surgeons listed *    Procedure: * No procedures listed *    Medications prior to admission:   Prior to Admission medications    Medication Sig Start Date End Date Taking? Authorizing Provider   docusate sodium (COLACE) 100 MG capsule Take 1 capsule by mouth daily    Joe Latif MD   KEYTRUDA 100 MG/4ML SOLN Inject 8 mLs as directed every 21 days 25   ProviderJoe MD   pravastatin (PRAVACHOL) 40 MG tablet 1 po qd q hs  Patient taking differently: Take 1 tablet by mouth nightly 1 po qd q hs 3/24/25   Lg Figueroa DO   metoprolol tartrate (LOPRESSOR) 50 MG tablet Take 1 tablet by mouth 2 times daily 3/24/25   Lg Figueroa DO   levothyroxine (EUTHYROX) 75 MCG tablet Take 1 tablet by mouth once daily 3/24/25   Lg Figueroa DO   fenofibrate 160 MG tablet 1 po qd  Patient taking differently: Take 1 tablet by mouth nightly 1 po qd 3/24/25   Lg Figueroa DO   DULoxetine (CYMBALTA) 60 MG extended release capsule Take 1 tablet daily 3/24/25   Lg Figueroa DO   amLODIPine (NORVASC) 5 MG tablet Take 1 tablet by mouth daily 3/24/25   Lg Figueroa DO   allopurinol (ZYLOPRIM) 300 MG tablet 1 po qd  Patient taking differently: Take 1 tablet by mouth nightly 1 po qd 3/24/25   Lg Figueroa DO   albuterol sulfate HFA (VENTOLIN HFA) 108 (90 Base) MCG/ACT inhaler Inhale 2 puffs into the lungs every 6 hours as needed for Wheezing 24   gL Figueroa DO   ondansetron (ZOFRAN-ODT) 4 MG disintegrating tablet Take 1 tablet by mouth 3 times daily as needed for Nausea or Vomiting 2/15/23   Benny Marie DO   nystatin (MYCOSTATIN) 824384 UNIT/GM powder Apply 3 times daily. 22   Shurilla, Opal, PA-C   clobetasol (TEMOVATE) 0.05 % cream Apply topically

## 2025-05-08 NOTE — PROGRESS NOTES
Spiritual Health History and Assessment/Progress Note  [facility]    Attempted Encounter, Initial Encounter,  ,  ,      Name: Roma Dias MRN: [id number]    Age: 64 y.o.     Sex: female   Language: English   Protestant: Pentecostalism   Pulmonary embolism without acute cor pulmonale (HCC)     Date: 5/8/2025                           Spiritual Assessment began in [address] MED SURG        Referral/Consult From: Rounding   Encounter Overview/Reason: Attempted Encounter, Initial Encounter  Service Provided For: Patient    Tita, Belief, Meaning:   Patient is connected with a tita tradition or spiritual practice  Family/Friends No family/friends present      Importance and Influence:  Patient unable to assess at this time  Family/Friends No family/friends present    Community:  Patient feels well-supported. Support system includes: Spouse/Partner and Extended family  Family/Friends No family/friends present    Assessment and Plan of Care:     Patient Interventions include: Other: Patient is lying in bed at rest and appears peaceful, did not disturb. Prayer offered for the patient  Family/Friends Interventions include: No family/friends present    Patient Plan of Care: Spiritual Care available upon further referral  Family/Friends Plan of Care: No family/friends present    Electronically signed by Chaplain Kellie on 5/8/2025 at 12:06 PM

## 2025-05-08 NOTE — PROCEDURES
PROCEDURE NOTE  Date: 5/8/2025   Name: Roma Dias  YOB: 1960    Procedures:    Preprocedure diagnosis:  Paroxysmal atrial fibrillation  Procedures, cardioversion elective arrhythmia external    Preprocedure diagnosis: A. Fib/flutter    Prior tests anticoagulated    Primary procedure  Written informed consent was obtained, the cardioversion was performed under stable fasting condition, self-adhesive anterior-posterior defibrillation pads were applied, the defibrillator was programmed to deliver energy in a synchronized fashion with a EKG. The patient was set up for monitoring of surface EKG and pulse oximetry continuously. Blood pressure was monitored and with automatic cuff measurements. Supplemental oxygen was administered. The procedure was performed under anesthesia by anesthesiology. After ensuring adequate anesthesia, DC CV was performed by delivering 200 J of direct current delivered in a synchronized fashion.     Result: Successful cardioversion to sinus rhythm, confirmed on 12-lead EKG.    Complication: None    Patient was monitored until awake and vitals remained stable.    April Brady M.D.  [facility] cardiology

## 2025-05-08 NOTE — PLAN OF CARE
Problem: Discharge Planning  Goal: Discharge to home or other facility with appropriate resources  5/8/2025 1025 by Donato Dunbar, RN  Outcome: Progressing  Flowsheets (Taken 5/8/2025 0800)  Discharge to home or other facility with appropriate resources: Arrange for needed discharge resources and transportation as appropriate

## 2025-05-08 NOTE — PROGRESS NOTES
INPATIENT CARDIOLOGY FOLLOW-UP    Name: Roma Dias    Age: 64 y.o.    Date of Admission: 5/7/2025 12:43 PM    Date of Service: 5/8/2025    Chief Complaint: Follow-up for paroxysmal atrial fibrillation, carcinoma of the breast, hypertension, pulmonary embolism and pericardial effusion.    Interim History:  No new overnight cardiac complaints.  Patient is n.p.o. and scheduled for RICHARD guided cardioversion at 2 PM today.  Currently with no complaints of CP, SOB, palpitations, dizziness, or lightheadedness.  Currently patient on Keytruda every 3 weeks.  Atrial fibrillation with intermittent RVR on telemetry.    Review of Systems:   Cardiac: As per HPI  General: No fever, chills  Pulmonary: As per HPI  HEENT: No visual disturbances, difficult swallowing  GI: No nausea, vomiting  Endocrine: No thyroid disease or DM  Musculoskeletal: FERNÁNDEZ x 4, no focal motor deficits  Skin: Intact, no rashes  Neuro/Psych: No headache or seizures    Problem List:  Patient Active Problem List   Diagnosis    Chest pain    Chronic depression    Coronary artery calcification    Primary hypertension    Obstructive sleep apnea    Acquired hypothyroidism    Moderate obesity    Chronic obstructive pulmonary disease (HCC)    Cigarette nicotine dependence without complication    Triple negative breast cancer (HCC) [C50.919]    Atrial fibrillation (HCC)    Single subsegmental pulmonary embolism without acute cor pulmonale (HCC)    Carcinoma of right female breast (HCC)    Pure hypercholesterolemia    Pericardial effusion    Pulmonary embolism without acute cor pulmonale (HCC)       Allergies:  Allergies   Allergen Reactions    Biaxin [Clarithromycin] Hallucinations     psychosis    Levofloxacin Other (See Comments)     Patient unsure reaction       Current Medications:  Current Facility-Administered Medications   Medication Dose Route Frequency Provider Last Rate Last Admin    fenofibrate (TRICOR) tablet 54 mg  54 mg Oral Daily Fanny Clemons MD

## 2025-05-08 NOTE — PROGRESS NOTES
[facility] Hospitalist Progress Note    Admitting Date and Time: 5/7/2025 12:43 PM  Admit Dx: Pulmonary embolism, unspecified chronicity, unspecified pulmonary embolism type, unspecified whether acute cor pulmonale present (HCC) [I26.99]    Subjective:  Patient is being followed for Pulmonary embolism, unspecified chronicity, unspecified pulmonary embolism type, unspecified whether acute cor pulmonale present (HCC) [I26.99]   Pt feels okay  Per RN: no additional concerns    ROS: denies fever, chills, cp, sob, n/v, HA unless otherwise noted above     allopurinol  300 mg Oral Nightly    docusate sodium  100 mg Oral Daily    DULoxetine  60 mg Oral Daily    fenofibrate  160 mg Oral Daily    levothyroxine  75 mcg Oral Daily    metoprolol succinate  50 mg Oral BID    pravastatin  40 mg Oral Nightly    sacubitril-valsartan  0.5 tablet Oral BID    sodium chloride flush  5-40 mL IntraVENous 2 times per day    silver sulfADIAZINE   Topical Daily     acetaminophen, 650 mg, Q6H PRN   Or  acetaminophen, 650 mg, Q6H PRN  heparin (porcine), 2,000 Units, PRN  heparin (porcine), 4,000 Units, PRN  magnesium sulfate, 2,000 mg, PRN  polyethylene glycol, 17 g, Daily PRN  potassium chloride, 40 mEq, PRN   Or  potassium alternative oral replacement, 40 mEq, PRN   Or  potassium chloride, 10 mEq, PRN  ondansetron, 4 mg, Q6H PRN  sodium chloride flush, 5-40 mL, PRN  sodium chloride, , PRN  acetaminophen, 650 mg, Q4H PRN         Objective:  /87   Pulse 60   Temp 98.1 °F (36.7 °C) (Oral)   Resp 18   SpO2 95%     General Appearance: alert and oriented to person, place and time and in NAD, laying in bed  Skin: warm and dry  Head: normocephalic and atraumatic  Eyes: PERRL, EOMI, conjunctivae normal  Neck: neck supple, trachea midline   Pulmonary/Chest: CTAB, no w/r/r, normal air movement, no respiratory distress, RA  Cardiovascular: IRR, no definite murmurs  Abdomen: soft, non-tender, non-distended  Extremities: no cyanosis, no

## 2025-05-08 NOTE — PROGRESS NOTES
Physician Progress Note      PATIENT:               SHANI ESPINOZA  CSN #:                  [id number]  :                       1960  ADMIT DATE:       2025 10:47 PM  DISCH DATE:        2025 8:22 AM  RESPONDING  PROVIDER #:        Gabriela Tyson DO          QUERY TEXT:    Acute hypoxemic respiratory failure is documented in the medical record IM PN   on  by Teri Herrera MD. Per H&P o wheezes, rales or rhonchi, normal   air movement, no respiratory distress. SPO2-90,92,96,100 on NC, RR-16,18,20.   Please provide additional clinical indicators supportive of your   documentation. Or please document if the diagnosis of Acute hypoxemic   respiratory failure has been ruled out after study.    The clinical indicators include:  F64yrs, Acute left subsegmental PE, COPD, CHF, HTN    -\"Patient was started on 2 L oxygen at [place] for comfort, she was never   hypoxic, and she states that this has greatly improved her dyspnea on   exertion. Pulmonary/Chest: clear to auscultation bilaterally- no wheezes,   rales or rhonchi, normal air movement, no respiratory distress.\"[IM H&P on    by Faith Kearney, APRN - CNP]    -\"Acute hypoxemic respiratory failure. Respiratory failure is multifactorial   likely secondary to CHF decompensation in the setting of A-fib with RVR and a   small pulmonary embolism and underlying COPD.  no respiratory distress\" [IM PN   on  by Teri Herrera MD]    -\"Normal respiratory effort is noted with no accessory muscle usage present\".   [cardiology consult on  by Norberto Zapien MD]    -\"Respiratory: Clear to auscultation bilaterally without wheezing or crackles.    Air entry is symmetric.\" [Pulmonology Consult on  by Vinnie Gandhi DO]    SPO2-90,92,96,100 on NC, from  to   RR-16,18,20 from  to     Treatment: 2L Nasal Cannula. Consult to Pulmonology.    Thank You  Keanu NYE CDS.  Options provided:  -- Acute

## 2025-05-08 NOTE — PROGRESS NOTES
Renal Dose Adjustment Policy (Generic)     This patient is on medication that requires renal, weight, and/or indication dose adjustment.      Date Body Weight IBW  Adjusted BW SCr  CrCl Dialysis status   5/8/2025   Ideal body weight: 52.4 kg (115 lb 8.3 oz)  Adjusted ideal body weight: 66.9 kg (147 lb 6.3 oz) Serum creatinine: 0.8 mg/dL 05/08/25 0520  Estimated creatinine clearance: 73 mL/min N/a       Pharmacy has dose-adjusted the following medication(s):    Date Previous Order Adjusted Order   5/8/2025 Tricor 160 mg daily  Tricor 54 mg daily       These changes were made per protocol according to the [facility]   Automatic Renal Dose Adjustment Policy.     *Please note this dose may need readjusted if patient's condition changes.    Please contact pharmacy with any questions regarding these changes.    Lay Gunter, [facility]  5/8/2025  8:33 AM

## 2025-05-08 NOTE — PROGRESS NOTES
[facility] called they can see pt on the monitor    Called Family Waiting pt  is not there    Called N2N report to Jihan

## 2025-05-09 VITALS
DIASTOLIC BLOOD PRESSURE: 82 MMHG | RESPIRATION RATE: 16 BRPM | TEMPERATURE: 97.7 F | SYSTOLIC BLOOD PRESSURE: 119 MMHG | OXYGEN SATURATION: 99 % | HEART RATE: 81 BPM

## 2025-05-09 LAB
ATYPICAL LYMPHOCYTE ABSOLUTE COUNT: 0.17 K/UL (ref 0–0.46)
ATYPICAL LYMPHOCYTES: 4 % (ref 0–4)
BASOPHILS # BLD: 0.1 K/UL (ref 0–0.2)
BASOPHILS NFR BLD: 3 % (ref 0–2)
EOSINOPHIL # BLD: 0.2 K/UL (ref 0.05–0.5)
EOSINOPHILS RELATIVE PERCENT: 5 % (ref 0–6)
ERYTHROCYTE [DISTWIDTH] IN BLOOD BY AUTOMATED COUNT: 13.6 % (ref 11.5–15)
HCT VFR BLD AUTO: 37 % (ref 34–48)
HGB BLD-MCNC: 11.4 G/DL (ref 11.5–15.5)
LYMPHOCYTES NFR BLD: 1.09 K/UL (ref 1.5–4)
LYMPHOCYTES RELATIVE PERCENT: 28 % (ref 20–42)
MCH RBC QN AUTO: 28.6 PG (ref 26–35)
MCHC RBC AUTO-ENTMCNC: 30.8 G/DL (ref 32–34.5)
MCV RBC AUTO: 92.7 FL (ref 80–99.9)
MONOCYTES NFR BLD: 0.17 K/UL (ref 0.1–0.95)
MONOCYTES NFR BLD: 4 % (ref 2–12)
NEUTROPHILS NFR BLD: 56 % (ref 43–80)
NEUTS SEG NFR BLD: 2.17 K/UL (ref 1.8–7.3)
PARTIAL THROMBOPLASTIN TIME: 106.5 SEC (ref 24.5–35.1)
PLATELET CONFIRMATION: NORMAL
PLATELET, FLUORESCENCE: 132 K/UL (ref 130–450)
PMV BLD AUTO: 13.8 FL (ref 7–12)
RBC # BLD AUTO: 3.99 M/UL (ref 3.5–5.5)
RBC # BLD: ABNORMAL 10*6/UL
WBC # BLD: ABNORMAL 10*3/UL
WBC OTHER # BLD: 3.9 K/UL (ref 4.5–11.5)

## 2025-05-09 PROCEDURE — 85730 THROMBOPLASTIN TIME PARTIAL: CPT

## 2025-05-09 PROCEDURE — 6370000000 HC RX 637 (ALT 250 FOR IP): Performed by: INTERNAL MEDICINE

## 2025-05-09 PROCEDURE — 85025 COMPLETE CBC W/AUTO DIFF WBC: CPT

## 2025-05-09 PROCEDURE — 99239 HOSP IP/OBS DSCHRG MGMT >30: CPT | Performed by: STUDENT IN AN ORGANIZED HEALTH CARE EDUCATION/TRAINING PROGRAM

## 2025-05-09 PROCEDURE — 6360000002 HC RX W HCPCS: Performed by: INTERNAL MEDICINE

## 2025-05-09 PROCEDURE — 2500000003 HC RX 250 WO HCPCS: Performed by: INTERNAL MEDICINE

## 2025-05-09 PROCEDURE — 6370000000 HC RX 637 (ALT 250 FOR IP): Performed by: STUDENT IN AN ORGANIZED HEALTH CARE EDUCATION/TRAINING PROGRAM

## 2025-05-09 PROCEDURE — 99233 SBSQ HOSP IP/OBS HIGH 50: CPT | Performed by: INTERNAL MEDICINE

## 2025-05-09 RX ORDER — FUROSEMIDE 20 MG/1
20 TABLET ORAL DAILY
Qty: 60 TABLET | Refills: 3 | Status: SHIPPED | OUTPATIENT
Start: 2025-05-10

## 2025-05-09 RX ORDER — AMIODARONE HYDROCHLORIDE 200 MG/1
200 TABLET ORAL 2 TIMES DAILY
Qty: 26 TABLET | Refills: 0 | Status: SHIPPED | OUTPATIENT
Start: 2025-05-09 | End: 2025-05-22

## 2025-05-09 RX ORDER — POTASSIUM CHLORIDE 1500 MG/1
20 TABLET, EXTENDED RELEASE ORAL DAILY
Status: DISCONTINUED | OUTPATIENT
Start: 2025-05-09 | End: 2025-05-09 | Stop reason: HOSPADM

## 2025-05-09 RX ORDER — METOPROLOL SUCCINATE 50 MG/1
50 TABLET, EXTENDED RELEASE ORAL 2 TIMES DAILY
Qty: 60 TABLET | Refills: 0 | Status: SHIPPED | OUTPATIENT
Start: 2025-05-09

## 2025-05-09 RX ORDER — AMIODARONE HYDROCHLORIDE 200 MG/1
200 TABLET ORAL DAILY
Qty: 30 TABLET | Refills: 0 | Status: SHIPPED | OUTPATIENT
Start: 2025-05-23

## 2025-05-09 RX ORDER — FUROSEMIDE 20 MG/1
20 TABLET ORAL DAILY
Status: DISCONTINUED | OUTPATIENT
Start: 2025-05-09 | End: 2025-05-09 | Stop reason: HOSPADM

## 2025-05-09 RX ADMIN — METOPROLOL SUCCINATE 50 MG: 50 TABLET, EXTENDED RELEASE ORAL at 08:35

## 2025-05-09 RX ADMIN — SILVER SULFADIAZINE: 10 CREAM TOPICAL at 08:38

## 2025-05-09 RX ADMIN — SODIUM CHLORIDE, PRESERVATIVE FREE 10 ML: 5 INJECTION INTRAVENOUS at 08:36

## 2025-05-09 RX ADMIN — POTASSIUM CHLORIDE 20 MEQ: 1500 TABLET, EXTENDED RELEASE ORAL at 11:25

## 2025-05-09 RX ADMIN — AMIODARONE HYDROCHLORIDE 200 MG: 200 TABLET ORAL at 08:35

## 2025-05-09 RX ADMIN — DOCUSATE SODIUM 100 MG: 100 CAPSULE, LIQUID FILLED ORAL at 08:35

## 2025-05-09 RX ADMIN — FUROSEMIDE 20 MG: 20 TABLET ORAL at 11:25

## 2025-05-09 RX ADMIN — FENOFIBRATE 54 MG: 160 TABLET ORAL at 08:35

## 2025-05-09 RX ADMIN — HEPARIN SODIUM 14 UNITS/KG/HR: 10000 INJECTION, SOLUTION INTRAVENOUS at 06:05

## 2025-05-09 RX ADMIN — LEVOTHYROXINE SODIUM 75 MCG: 0.07 TABLET ORAL at 06:36

## 2025-05-09 RX ADMIN — SACUBITRIL AND VALSARTAN 0.5 TABLET: 24; 26 TABLET, FILM COATED ORAL at 08:35

## 2025-05-09 RX ADMIN — EMPAGLIFLOZIN 10 MG: 10 TABLET, FILM COATED ORAL at 08:35

## 2025-05-09 RX ADMIN — DULOXETINE 60 MG: 60 CAPSULE, DELAYED RELEASE ORAL at 08:35

## 2025-05-09 ASSESSMENT — PAIN SCALES - GENERAL
PAINLEVEL_OUTOF10: 0

## 2025-05-09 NOTE — DISCHARGE SUMMARY
[facility] Hospitalist Physician Discharge Summary       Lg Figueroa, DO  [address]  [facility] [address]  [phone]    Go on 5/16/2025  Your hospital follow up appointment is on Friday May 16th @ 7:45 am ! ! !    ! ! IT IS VERY IMPORTANT YOU KEEP THIS APPOINTMENT ! !    April Brady MD  [address] [facility] [address]  [phone]    Call  for hospital follow up    James Castellano MD  [address] [facility] [address]  [phone]    Follow up        Activity level: as tolerated    Dispo: home      Condition on discharge: stable    Patient ID:  Roma Dias  [id number]  64 y.o.  1960    Admit date: 5/7/2025    Discharge date and time:  5/9/2025  4:14 PM    Admission Diagnoses: Principal Problem:    Pulmonary embolism (HCC)  Resolved Problems:    * No resolved hospital problems. *      Discharge Diagnoses: Principal Problem:    Pulmonary embolism (HCC)  Resolved Problems:    * No resolved hospital problems. *      Consults:  IP CONSULT TO HEM/ONC    Procedures:   ECHO 5/2    Left Ventricle: The left ventricle is normal in size at end diastole and mildly dilated in systole with normal thickness of endocardial surfaces.  No regional wall motion abnormalities are identified with global hypokinesis and moderate left ventricular systolic dysfunction.  An estimated ejection fraction of approximately 40% is calculated.  Indeterminate diastolic function is present on the basis of atrial arrhythmias.    Aortic Valve: Aortic valve is tricuspid with mild leaflet thickening and no evidence of aortic stenosis.  Mild aortic insufficiency is present.    Mitral Valve: The mitral valve is structurally normal with moderate centrally directed mitral regurgitation.    Tricuspid Valve: The tricuspid valve is structurally normal with mild tricuspid regurgitation.  A right ventricular systolic pressure of approximately 37 mmHg is calculated suggesting mild pulmonary

## 2025-05-09 NOTE — PROGRESS NOTES
INPATIENT CARDIOLOGY FOLLOW-UP    Name: Roma Dias    Age: 64 y.o.    Date of Admission: 5/7/2025 12:43 PM    Date of Service: 5/9/2025    Chief Complaint: Follow-up for paroxysmal atrial fibrillation, carcinoma of the breast, hypertension, pulmonary embolism and pericardial effusion.    Interim History:  No new overnight cardiac complaints.  Underwent  RICHARD guided cardioversion on 5/8/25 and successfully converted to NSRCurrently with no complaints of CP, SOB, palpitations, dizziness, or lightheadedness.  Currently patient on Keytruda every 3 weeks.  Atrial fibrillation with intermittent RVR on telemetry.    Review of Systems:   Cardiac: As per HPI  General: No fever, chills  Pulmonary: As per HPI  HEENT: No visual disturbances, difficult swallowing  GI: No nausea, vomiting  Endocrine: No thyroid disease or DM  Musculoskeletal: FERNÁNDEZ x 4, no focal motor deficits  Skin: Intact, no rashes  Neuro/Psych: No headache or seizures    Problem List:  Patient Active Problem List   Diagnosis    Chest pain    Chronic depression    Coronary artery calcification    Primary hypertension    Obstructive sleep apnea    Acquired hypothyroidism    Moderate obesity    Chronic obstructive pulmonary disease (HCC)    Cigarette nicotine dependence without complication    Triple negative breast cancer (HCC) [C50.919]    Atrial fibrillation (HCC)    Single subsegmental pulmonary embolism without acute cor pulmonale (HCC)    Carcinoma of right female breast (HCC)    Pure hypercholesterolemia    Pericardial effusion    Pulmonary embolism (HCC)       Allergies:  Allergies   Allergen Reactions    Biaxin [Clarithromycin] Hallucinations     psychosis    Levofloxacin Other (See Comments)     Patient unsure reaction       Current Medications:  Current Facility-Administered Medications   Medication Dose Route Frequency Provider Last Rate Last Admin    fenofibrate (TRICOR) tablet 54 mg  54 mg Oral Daily Fanny Clemons MD   54 mg at 05/09/25 4570

## 2025-05-09 NOTE — CARE COORDINATION
Discharge order noted. Plan is home with . No longer requires home O2. [facility] from [facility] notified. Patient denies any home needs. Her suband will transport home upon discharge.  Cintia RAYN, RN  Case Management

## 2025-05-09 NOTE — PROGRESS NOTES
[facility] Hospitalist Progress Note    Admitting Date and Time: 5/7/2025 12:43 PM  Admit Dx: Pulmonary embolism, unspecified chronicity, unspecified pulmonary embolism type, unspecified whether acute cor pulmonale present (HCC) [I26.99]    Subjective:  Patient is being followed for Pulmonary embolism, unspecified chronicity, unspecified pulmonary embolism type, unspecified whether acute cor pulmonale present (HCC) [I26.99]   Pt feels okay  Per RN: no additional concerns    ROS: denies fever, chills, cp, sob, n/v, HA unless otherwise noted above     fenofibrate  54 mg Oral Daily    amiodarone  200 mg Oral BID    [START ON 5/23/2025] amiodarone  200 mg Oral Daily    empagliflozin  10 mg Oral Daily    allopurinol  300 mg Oral Nightly    docusate sodium  100 mg Oral Daily    DULoxetine  60 mg Oral Daily    levothyroxine  75 mcg Oral Daily    metoprolol succinate  50 mg Oral BID    pravastatin  40 mg Oral Nightly    sacubitril-valsartan  0.5 tablet Oral BID    sodium chloride flush  5-40 mL IntraVENous 2 times per day    silver sulfADIAZINE   Topical Daily     acetaminophen, 650 mg, Q6H PRN   Or  acetaminophen, 650 mg, Q6H PRN  heparin (porcine), 2,000 Units, PRN  heparin (porcine), 4,000 Units, PRN  magnesium sulfate, 2,000 mg, PRN  polyethylene glycol, 17 g, Daily PRN  potassium chloride, 40 mEq, PRN   Or  potassium alternative oral replacement, 40 mEq, PRN   Or  potassium chloride, 10 mEq, PRN  ondansetron, 4 mg, Q6H PRN  sodium chloride flush, 5-40 mL, PRN  sodium chloride, , PRN         Objective:  /78   Pulse 76   Temp 97.7 °F (36.5 °C) (Oral)   Resp 16   SpO2 97%     General Appearance: alert and oriented to person, place and time and in NAD, walking around room  Skin: warm and dry  Head: normocephalic and atraumatic  Eyes: PERRL, EOMI, conjunctivae normal  Neck: neck supple, trachea midline   Pulmonary/Chest: CTAB, no w/r/r, normal air movement, no respiratory distress, RA  Cardiovascular:

## 2025-05-09 NOTE — CONSULTS
[facility]  Hematology/Oncology  Consult      Patient Name: Roma Dias  YOB: 1960  PCP: Lg Figueroa DO   Referring Provider:      Reason for Consultation: No chief complaint on file.       History of Present Illness: This is a 64-year-old female patient following with Dr. Castellano for triple negative breast cancer, poorly differentiated with negative ER and CT receptors and nonamplified HER2/sascha by FISH 2+ equivocal by IHC. She has multifocal disease and 3 separate masses in the upper outer quadrant of the right breast by MRI largest measuring 11 mm making it T1c N0 M0 clinically. She is s/p neoadjuvant chemoimmunotherapy per keynote 522 study with Taxol/carboplatinum and pembrolizumab followed sequentially by 4 cycles of AC along with pembrolizumab. posttreatment MRI demonstrated a partial response to therapy. Plan for bracketed right breast Magseed lumpectomy and sentinel lymph node biopsy with Dr. Santiago. She is currently undergoing single agent immunotherapy for a total of 17 cycles of pembrolizumab. She will also need post operative adjuvant radiation therapy to right breast.  Patient presented to the ED for evaluation of shortness of breath.  CT pulmonary for minimal PE with a tiny nonobstructive thrombus seen involving the posterior aspect of the apicoposterior segment of the left upper in the Josue hilar region.  Cough.  Minimal.  Moderate bilateral pleural effusions.  Mild cardiomegaly.  Moderate T8 compression fracture.  Mild 9 to T11 compression fractures.  Patient was also found to be in Afib with RVR she is was started on a heparin drip. Cardiology consulted and he is s/p RICHARD guided cardioversion. Recommendation to start on eliquis.  Consultation for patient with breast cancer on treatment now found with PE and anticoagulation recommendations.    ROS: Over 10 systems were reviewed and all were negative except as mention above.      Diagnostic Data:     Past  drip.  Switch to Eliquis upon discharge.   Rapid [id number] post cardioversion.  New onset congestive heart failure status post left heart cath.  No intervention.  Will follow as outpatient.      BRICE Jackson - CNP  Electronically signed 5/9/2025 at 11:09 AM  Patient seen and examined.  Agree with CNP assessment plan.  Note updated.   Jose Enriquez MD

## 2025-05-09 NOTE — PLAN OF CARE
Problem: Discharge Planning  Goal: Discharge to home or other facility with appropriate resources  Outcome: Progressing     Problem: Pain  Goal: Verbalizes/displays adequate comfort level or baseline comfort level  Outcome: Progressing     Problem: Respiratory - Adult  Goal: Achieves optimal ventilation and oxygenation  Outcome: Progressing     Problem: Discharge Planning  Goal: Discharge to home or other facility with appropriate resources  Outcome: Progressing     Problem: Pain  Goal: Verbalizes/displays adequate comfort level or baseline comfort level  Outcome: Progressing     Problem: Respiratory - Adult  Goal: Achieves optimal ventilation and oxygenation  Outcome: Progressing

## 2025-05-09 NOTE — PLAN OF CARE
Problem: Discharge Planning  Goal: Discharge to home or other facility with appropriate resources  5/9/2025 1133 by Donato Dunbar, RN  Outcome: Progressing  Flowsheets (Taken 5/9/2025 0800)  Discharge to home or other facility with appropriate resources:   Arrange for needed discharge resources and transportation as appropriate   Identify barriers to discharge with patient and caregiver     Problem: Pain  Goal: Verbalizes/displays adequate comfort level or baseline comfort level  5/9/2025 1133 by Donato Dunbar, RN  Outcome: Progressing     Problem: Respiratory - Adult  Goal: Achieves optimal ventilation and oxygenation  5/9/2025 1133 by Donato Dunbar, RN  Outcome: Progressing  Flowsheets (Taken 5/9/2025 0800)  Achieves optimal ventilation and oxygenation:   Assess for changes in respiratory status   Assess for changes in mentation and behavior

## 2025-05-09 NOTE — PROGRESS NOTES
CLINICAL PHARMACY NOTE: MEDS TO BEDS    Total # of Prescriptions Filled: 6   The following medications were delivered to the patient:  Eliquis 5 mg  Amiodarone 200 mg  Metoprolol succ er 50 mg  Jardiance 10 mg  Lasix 20 mg  Entresto 24-26     Additional Documentation:

## 2025-05-12 ENCOUNTER — CARE COORDINATION (OUTPATIENT)
Dept: CARE COORDINATION | Age: 65
End: 2025-05-12

## 2025-05-12 NOTE — CARE COORDINATION
Care Transitions Note    Initial Call - Call within 2 business days of discharge: Yes    Patient Current Location:  Home: [address] [place] [address]    Care Transition Nurse contacted the patient by telephone to perform post hospital discharge assessment, verified name and  as identifiers.  Provided introduction to self, and explanation of the Care Transition Nurse role.    Patient: Roma Dias    Patient : 1960   MRN: [id number]    Reason for Admission: persistent AFib  Discharge Date: 25  RURS: Readmission Risk Score: 14.9      Last Discharge Facility       Date Complaint Diagnosis Description Type Department Provider    25  Persistent atrial fibrillation (HCC) Admission (Discharged) [facility]ES[facility] BoogieW Fanny Clemons MD    25  Chest pain, unspecified type Admission (Discharged) Katina Rivera MD            Was this an external facility discharge? No    Additional needs identified to be addressed with provider   No needs identified             Method of communication with provider: none.    Patients top risk factors for readmission: depression, lack of knowledge about disease, medical condition-AFib, HTN, COPD, immunocompromised, multiple health system providers, and polypharmacy    Interventions to address risk factors:   Education: AFib, HTN.  Patient to call and schedule with med/onc (Dr Castellano) as per AVS.    Care Summary Note:   -Patient initially admitted to [facility] on 25 with AFib with RVR, to [facility] on 25 for LHC.  Required DCCV on two different dates.  Returned to [facility] on 25 and discharged to home on 25.  See hospital discharge summary for further details.    -Patient pleasant in conversation, states she is happy to be home.  No issues to right wrist cath site.  Denies any SOB which she states is what caused her to go to the hospital.  Reports dry lips with chapping of skin around-wonders if this is a side effect of one of her new medications.  States she applied

## 2025-05-16 ENCOUNTER — OFFICE VISIT (OUTPATIENT)
Dept: PRIMARY CARE CLINIC | Age: 65
End: 2025-05-16

## 2025-05-16 VITALS
OXYGEN SATURATION: 97 % | DIASTOLIC BLOOD PRESSURE: 72 MMHG | HEART RATE: 75 BPM | SYSTOLIC BLOOD PRESSURE: 102 MMHG | TEMPERATURE: 97.8 F | WEIGHT: 179 LBS | HEIGHT: 63 IN | BODY MASS INDEX: 31.71 KG/M2

## 2025-05-16 DIAGNOSIS — Z09 HOSPITAL DISCHARGE FOLLOW-UP: Primary | ICD-10-CM

## 2025-05-16 LAB
EKG ATRIAL RATE: 73 BPM
EKG Q-T INTERVAL: 490 MS
EKG QRS DURATION: 62 MS
EKG QTC CALCULATION (BAZETT): 675 MS
EKG R AXIS: -85 DEGREES
EKG T AXIS: 161 DEGREES
EKG VENTRICULAR RATE: 114 BPM

## 2025-05-16 RX ORDER — CLOBETASOL PROPIONATE 0.5 MG/G
EMULSION TOPICAL DAILY
Qty: 60 G | Refills: 0 | Status: SHIPPED | OUTPATIENT
Start: 2025-05-16

## 2025-05-16 RX ORDER — POTASSIUM CHLORIDE 750 MG/1
10 TABLET, EXTENDED RELEASE ORAL DAILY
COMMUNITY
Start: 2025-03-06

## 2025-05-16 ASSESSMENT — PATIENT HEALTH QUESTIONNAIRE - PHQ9
5. POOR APPETITE OR OVEREATING: SEVERAL DAYS
2. FEELING DOWN, DEPRESSED OR HOPELESS: NOT AT ALL
3. TROUBLE FALLING OR STAYING ASLEEP: NOT AT ALL
9. THOUGHTS THAT YOU WOULD BE BETTER OFF DEAD, OR OF HURTING YOURSELF: NOT AT ALL
7. TROUBLE CONCENTRATING ON THINGS, SUCH AS READING THE NEWSPAPER OR WATCHING TELEVISION: MORE THAN HALF THE DAYS
10. IF YOU CHECKED OFF ANY PROBLEMS, HOW DIFFICULT HAVE THESE PROBLEMS MADE IT FOR YOU TO DO YOUR WORK, TAKE CARE OF THINGS AT HOME, OR GET ALONG WITH OTHER PEOPLE: NOT DIFFICULT AT ALL
SUM OF ALL RESPONSES TO PHQ QUESTIONS 1-9: 5
4. FEELING TIRED OR HAVING LITTLE ENERGY: MORE THAN HALF THE DAYS
SUM OF ALL RESPONSES TO PHQ QUESTIONS 1-9: 5
8. MOVING OR SPEAKING SO SLOWLY THAT OTHER PEOPLE COULD HAVE NOTICED. OR THE OPPOSITE, BEING SO FIGETY OR RESTLESS THAT YOU HAVE BEEN MOVING AROUND A LOT MORE THAN USUAL: NOT AT ALL
1. LITTLE INTEREST OR PLEASURE IN DOING THINGS: NOT AT ALL
6. FEELING BAD ABOUT YOURSELF - OR THAT YOU ARE A FAILURE OR HAVE LET YOURSELF OR YOUR FAMILY DOWN: NOT AT ALL
SUM OF ALL RESPONSES TO PHQ QUESTIONS 1-9: 5
SUM OF ALL RESPONSES TO PHQ QUESTIONS 1-9: 5

## 2025-05-16 NOTE — PROGRESS NOTES
Post-Discharge Transitional Care  Follow Up      Assessment and Plan  Hr and blood pressure well controlled  Pending follow up with Cardiology  Tolerating medications well  Start clobetasol for contact dermatitis from adhesives  Following with oncology for breast cancer  Planning on following with Pulmonology for obstructive sleep apnea   Medications clarified --- she should take eliqus 1 tab bid starting now, transition to amiodarone 200 mg daily starting next week  Assessment & Plan  1. Atrial Fibrillation.  The patient's heart rate was irregular and elevated, with a slight deviation in cardiac output. The etiology of the atrial fibrillation could be multifactorial, potentially linked to his cancer diagnosis and subsequent hypercoagulable state, leading to a pulmonary embolism and increased cardiac workload. His rhythm appears stable at present. The possibility of intermittent atrial fibrillation was discussed. He was advised to monitor his heart rate using a pulse oximeter or blood pressure cuff, and to report any irregularities. The patient will continue taking amiodarone 200 mg twice a day for 14 days, then 200 mg daily thereafter. The continuation of amiodarone will be reassessed by the cardiologist.    2. Pulmonary Embolism.  The patient was recently diagnosed with a pulmonary embolism, which may have contributed to his atrial fibrillation. He is currently on Eliquis, taking 2 tablets by mouth twice a day for the first 7 days, then transitioning to 1 tablet twice a day indefinitely. The patient was educated on the importance of continuing Eliquis as a blood thinner to prevent further clots.    3. Skin Rash.  The patient reported a rash potentially related to his medications. He was advised to use Silvadene cream for the rash and to avoid applying it to sensitive areas such as the face and genital region. If the rash persists or worsens, further treatment options will be considered.    4. Medication

## 2025-05-19 NOTE — PROGRESS NOTES
T8, T9-11  Carpal tunnel  Anxiety/depression  Chronic fatigue - multifactorial        PLAN:  Extensive discussion with patient and  reviewing all cardiac testing done during admit, diagnosis, treatment plan  Add Aldactone 12.5mg daily to current GDMT for HFmrEF.  Discussed importance of not taking potassium supplements or using potassium salt substitutes.   BMP in 1 week.   Counseled patient extensively on the importance of continued tobacco cessation.  Offered nicotine replacement and medication assistance - declined.  Discussed alternative stress management.  Advised patient &  on all of the following:  low sodium diet ( < or equal to 2 g daily); Daily monitoring of weight -make note and bring weights with you to  Appointments; Stay hydrated (64 oz daily) with daily monitoring of fluid intake; Get BP cuff - have available - take BP if you do not feel well and write down number and symptoms ;Watch for signs of heart failure - weight gain of 3 lbs or more in 1-2 days, swelling in legs or belly, shortness of breath - trouble breathing when you lie down --  call us or heart failure clinic at start of symptoms; discussed symptoms that would prompt ED visit.   Discussed importance of keeping sleep med appointment in June and the relationship between untreated sleep apnea, A Fib, and heart failure.   CHF Clinic referral sent.  Patient is agreeable to attend.    Patient was originally seen by Dr. Zapien inpatient - Dr. Brady followed patient.  She requests to be Dr. Brady's patient since Dr. Zapien has retired. -- Follow up with Dr. Brady in 3 months  I  will talk with Dr Brady about when it is safe for lumpectomy    I spent a total of 38 minutes on the date of service which included preparing to see the patient, face to face patient care, completing clinical documentation, obtaining and/or reviewing separately obtained history, performing a medically appropriate exam and counseling and educating the

## 2025-05-20 ENCOUNTER — OFFICE VISIT (OUTPATIENT)
Dept: CARDIOLOGY CLINIC | Age: 65
End: 2025-05-20
Payer: MEDICARE

## 2025-05-20 VITALS
HEART RATE: 65 BPM | HEIGHT: 63 IN | DIASTOLIC BLOOD PRESSURE: 68 MMHG | RESPIRATION RATE: 18 BRPM | BODY MASS INDEX: 31.54 KG/M2 | SYSTOLIC BLOOD PRESSURE: 100 MMHG | WEIGHT: 178 LBS

## 2025-05-20 DIAGNOSIS — I50.22 HEART FAILURE WITH MID-RANGE EJECTION FRACTION (HFMEF) (HCC): Primary | ICD-10-CM

## 2025-05-20 PROCEDURE — 3074F SYST BP LT 130 MM HG: CPT | Performed by: CLINICAL NURSE SPECIALIST

## 2025-05-20 PROCEDURE — 93000 ELECTROCARDIOGRAM COMPLETE: CPT | Performed by: INTERNAL MEDICINE

## 2025-05-20 PROCEDURE — 3017F COLORECTAL CA SCREEN DOC REV: CPT | Performed by: CLINICAL NURSE SPECIALIST

## 2025-05-20 PROCEDURE — G8400 PT W/DXA NO RESULTS DOC: HCPCS | Performed by: CLINICAL NURSE SPECIALIST

## 2025-05-20 PROCEDURE — 1111F DSCHRG MED/CURRENT MED MERGE: CPT | Performed by: CLINICAL NURSE SPECIALIST

## 2025-05-20 PROCEDURE — 4004F PT TOBACCO SCREEN RCVD TLK: CPT | Performed by: CLINICAL NURSE SPECIALIST

## 2025-05-20 PROCEDURE — 1123F ACP DISCUSS/DSCN MKR DOCD: CPT | Performed by: CLINICAL NURSE SPECIALIST

## 2025-05-20 PROCEDURE — G8427 DOCREV CUR MEDS BY ELIG CLIN: HCPCS | Performed by: CLINICAL NURSE SPECIALIST

## 2025-05-20 PROCEDURE — 3078F DIAST BP <80 MM HG: CPT | Performed by: CLINICAL NURSE SPECIALIST

## 2025-05-20 PROCEDURE — G8417 CALC BMI ABV UP PARAM F/U: HCPCS | Performed by: CLINICAL NURSE SPECIALIST

## 2025-05-20 PROCEDURE — 99214 OFFICE O/P EST MOD 30 MIN: CPT | Performed by: CLINICAL NURSE SPECIALIST

## 2025-05-20 PROCEDURE — 1090F PRES/ABSN URINE INCON ASSESS: CPT | Performed by: CLINICAL NURSE SPECIALIST

## 2025-05-20 PROCEDURE — 1159F MED LIST DOCD IN RCRD: CPT | Performed by: CLINICAL NURSE SPECIALIST

## 2025-05-20 RX ORDER — SPIRONOLACTONE 25 MG/1
12.5 TABLET ORAL DAILY
Qty: 45 TABLET | Refills: 1 | Status: SHIPPED | OUTPATIENT
Start: 2025-05-20

## 2025-05-20 NOTE — PATIENT INSTRUCTIONS
Continue to not smoke - great job.    Advised low sodium diet ( < or equal to 2 g daily)  Daily monitoring of weight -make note and bring weights with you to Dr. Appointments   Stay hydrated (64 oz daily) with daily monitoring of fluid intake  Get BP cuff - have available - take BP if you do not feel well and write down number and symptoms   Add Aldactone 1/2 tab daily to meds. Need blood work in 1 week after take it.  No Potassium supplements   CHF Clinic will be calling you for appointment    Watch for signs of heart failure - weight gain of 3 lbs or more in 1-2 days, swelling in legs or belly, shortness of breath - trouble breathing when you lie down --  call us or heart failure clinic at start of symptoms   We will talk with Dr. Brady about when it is safe for lumpectomy.

## 2025-05-22 ENCOUNTER — TELEPHONE (OUTPATIENT)
Age: 65
End: 2025-05-22

## 2025-05-22 NOTE — TELEPHONE ENCOUNTER
Message left for Roma regarding calling back to get scheduled with CHF clinic in [place].    Electronically signed by Jagruti Weldon RN on 5/22/2025 at 8:57 AM      05/27/25 12:24 PM    Call placed again to schedule chf clinic appointment. No answer and message left on answering machine.    Electronically signed by Jagruti Weldon RN on 5/27/2025 at 12:24 PM

## 2025-05-27 ENCOUNTER — TELEPHONE (OUTPATIENT)
Dept: OTHER | Age: 65
End: 2025-05-27

## 2025-05-28 ENCOUNTER — CARE COORDINATION (OUTPATIENT)
Dept: CARE COORDINATION | Age: 65
End: 2025-05-28

## 2025-05-28 NOTE — CARE COORDINATION
Care Transitions Note    Follow Up Call     Patient Current Location:  Home: [address] [facility] [address]    Care Transition Nurse contacted the patient by telephone. Verified name and  as identifiers.    Additional needs identified to be addressed with provider   High priority: Pt admits to being in a depressed state d/t recent health issues. Pt feeling sad, fatigued, lack of motivation/energy. Denies suicidal thoughts, self harm.   Please contact patient with any questions/concerns/changes.                   Method of communication with provider: chart routing.    Care Summary Note:     CTN spoke with Roma for subsequent Care Transition outreach. Roma shares that she is in a depressed state. Pt feeling sad, fatigued, lack of motivation/energy. Denies suicidal thoughts, self harm. CTN routed message High Priority to PCP to address.    Pt denies CP palpitations, SOB, CAMACHO, dizziness, new/increased edema. Pt is monitoring daily weights and BP/HR. Pt did not provide any readings.     Discussed the importance of monitoring vitals daily, keep log of daily weights and report a weight gain of 3 lbs or more in 1 day, or 5 lbs or more in a week. Pt declined RPM enrollment, stated her  purchased a BP cuff that notifies you if you have an irregular heart beat, has scale at home.     CTN let Pt know that CHF Clinic has been trying to reach her for an appt. Pt is aware and will call clinic today.    Noted Pt has been losing weight, not following Cardiac Diet. Offered Pt RD referral. Pt is agreeable, stating she was looking on the Internet and getting overwhelmed.     Pt denies Transportation, Home, or Medication needs. Instructed to contact PCP/Specialist or present to ED for new or worsening symptoms. Pt v/u.    Plan of care updates since last contact:  New medications reviewed: Clobetasol cream, Spironolactone  PCP, Cardiology and Oncology appts completed  Referral to RD  Internal referral to [facility]

## 2025-05-29 ENCOUNTER — CARE COORDINATION (OUTPATIENT)
Dept: CARE COORDINATION | Age: 65
End: 2025-05-29

## 2025-05-29 NOTE — CARE COORDINATION
RD received referral from Nely ROTHMAN:  Diagnosis: New Onset Afib, HF, HTN, COPD. Pt on Chemo for Breast Cancer. Pt needs info on Cardiac Diet. She has also been losing weight.      RD contacted Roma Dias regarding Dietitian referral. Pt answered, RD explained reason for call and role in care. Pt requested RD call back on a different day. Patient prefers a call on Tuesday 6/3/25 in the afternoon. RD will outreach as requested and follow up as appropriate.       Kristie Tran RDN, LD  [phone]

## 2025-05-29 NOTE — CARE COORDINATION
Care Transitions Note    Follow Up Call     Attempted to reach patient for transitions of care follow up.  Unable to reach patient.      Outreach Attempts:   HIPAA compliant voicemail left for patient.     Care Summary Note:     CTN left HIPAA complaint message notifying Pt that Dr Figueroa would like her to schedule an office visit if her symptoms worsen or if she wishes to address the issue of being depressed further.      Follow Up Appointment:   Future Appointments         Provider Specialty Dept Phone    6/1/2025 10:00 PM [facility] [phone]    8/18/2025 9:00 AM Lg Figueroa, DO Primary Care [phone]          Nely Rayo LPN

## 2025-06-01 ENCOUNTER — HOSPITAL ENCOUNTER (OUTPATIENT)
Dept: SLEEP CENTER | Age: 65
Discharge: HOME | End: 2025-06-01
Payer: MEDICARE

## 2025-06-01 DIAGNOSIS — G47.33 OBSTRUCTIVE SLEEP APNEA: Primary | ICD-10-CM

## 2025-06-01 PROCEDURE — 95810 POLYSOM 6/> YRS 4/> PARAM: CPT

## 2025-06-02 ENCOUNTER — TELEPHONE (OUTPATIENT)
Dept: PRIMARY CARE CLINIC | Age: 65
End: 2025-06-02

## 2025-06-02 VITALS
WEIGHT: 174 LBS | HEART RATE: 65 BPM | DIASTOLIC BLOOD PRESSURE: 76 MMHG | HEIGHT: 64 IN | BODY MASS INDEX: 29.71 KG/M2 | SYSTOLIC BLOOD PRESSURE: 130 MMHG | OXYGEN SATURATION: 98 %

## 2025-06-02 ASSESSMENT — SLEEP AND FATIGUE QUESTIONNAIRES
HOW LIKELY ARE YOU TO NOD OFF OR FALL ASLEEP WHILE SITTING QUIETLY AFTER LUNCH WITHOUT ALCOHOL: HIGH CHANCE OF DOZING
HOW LIKELY ARE YOU TO NOD OFF OR FALL ASLEEP WHILE WATCHING TV: SLIGHT CHANCE OF DOZING
HOW LIKELY ARE YOU TO NOD OFF OR FALL ASLEEP WHILE SITTING INACTIVE IN A PUBLIC PLACE: WOULD NEVER DOZE
HOW LIKELY ARE YOU TO NOD OFF OR FALL ASLEEP WHILE LYING DOWN TO REST IN THE AFTERNOON WHEN CIRCUMSTANCES PERMIT: MODERATE CHANCE OF DOZING
HOW LIKELY ARE YOU TO NOD OFF OR FALL ASLEEP WHILE SITTING AND READING: MODERATE CHANCE OF DOZING
HOW LIKELY ARE YOU TO NOD OFF OR FALL ASLEEP WHEN YOU ARE A PASSENGER IN A CAR FOR AN HOUR WITHOUT A BREAK: HIGH CHANCE OF DOZING
HOW LIKELY ARE YOU TO NOD OFF OR FALL ASLEEP IN A CAR, WHILE STOPPED FOR A FEW MINUTES IN TRAFFIC: WOULD NEVER DOZE
HOW LIKELY ARE YOU TO NOD OFF OR FALL ASLEEP WHILE SITTING AND TALKING TO SOMEONE: SLIGHT CHANCE OF DOZING
ESS TOTAL SCORE: 12

## 2025-06-02 NOTE — TELEPHONE ENCOUNTER
She should continue Jardiance.    Metoprolol and amiodarone are managed by cardiology --- she should double check with them but I believe she should continue those as well.

## 2025-06-02 NOTE — TELEPHONE ENCOUNTER
Pt's  called in stating that they are not sure if Roma needs to still be on     Jardiance  Amiodarone  Metoprolol Succinate     The medications were prescribed by the hospital and wanted to know if you could let them know if she still needs to take them or not?    Please advise

## 2025-06-02 NOTE — TELEPHONE ENCOUNTER
Pt was notified.     Pt does need a refill     Jardiance    [facility] [address] [place] [address] - [address] [place] [address] -  [phone] - F [phone]  [address] [place] [address]  Phone: [phone]  Fax: [phone]

## 2025-06-03 ENCOUNTER — CARE COORDINATION (OUTPATIENT)
Dept: CARE COORDINATION | Age: 65
End: 2025-06-03

## 2025-06-03 NOTE — CARE COORDINATION
Contacted Roma Dias and left voicemail regarding Dietitian referral. Left call back number and will follow up as appropriate.         Kristie Tran RDN, LD  [phone]

## 2025-06-04 ENCOUNTER — CARE COORDINATION (OUTPATIENT)
Dept: CARE COORDINATION | Age: 65
End: 2025-06-04

## 2025-06-04 NOTE — CARE COORDINATION
Care Transitions Note    Follow Up Call     Patient Current Location:  Home: [address] [address]    Care Transition Nurse contacted the patient by telephone. Verified name and  as identifiers.    Additional needs identified to be addressed with provider   Standard priority: needs refills-Toprol and Amiodarone                  Method of communication with provider: chart routing to cardiology provider.    Care Summary Note:   -Patient reports her depressive feelings are about the same.  States she needs to establish with another psych provider as her previous provider is no longer available.  Patient feels she may need an adjustment in her medication.  Noted in EMR patient is on Cymbalta.  CTN informed patient that her PCP is available for assistance if needed as per last CTN note in EMR.  -Is monitoring her weight, B/P, and HR, states have been good, did not provide any readings.  -Patient denies any further needs at this time and is agreeable to final call from care transitions next outreach.     Plan of care updates since last contact:  Referrals: CTN reminded patient that central dietician has been trying to reach her.   Completed sleep appointment on 25.  B/P 130/76, HR 65, oximeter 98% RA, weight 174 pounds at visit.  Scheduled with [facility].     Advance Care Planning:   Does patient have an Advance Directive: health care decision maker confirmed on a prior call.    Medication Review:  No changes since last call.     Remote Patient Monitoring:  Offered patient enrollment in the Remote Patient Monitoring (RPM) program for in-home monitoring: Yes, but did not enroll at this time: already monitoring with home equipment.    Assessments:  Care Transitions Subsequent and Final Call    Subsequent and Final Calls  Have your medications changed?: No  Do you have any questions related to your medications?: No  Do you currently have any active services?: Yes  Are you currently active with

## 2025-06-05 ENCOUNTER — CARE COORDINATION (OUTPATIENT)
Dept: CARE COORDINATION | Age: 65
End: 2025-06-05

## 2025-06-05 RX ORDER — METOPROLOL SUCCINATE 50 MG/1
50 TABLET, EXTENDED RELEASE ORAL 2 TIMES DAILY
Qty: 60 TABLET | Refills: 0 | Status: SHIPPED | OUTPATIENT
Start: 2025-06-05

## 2025-06-05 RX ORDER — AMIODARONE HYDROCHLORIDE 200 MG/1
200 TABLET ORAL DAILY
Qty: 30 TABLET | Refills: 0 | Status: SHIPPED | OUTPATIENT
Start: 2025-06-05

## 2025-06-05 NOTE — CARE COORDINATION
Contacted Roma Dias and left voicemail regarding Dietitian referral. Left call back number. RD outreached 5/29/25, 6/3/25 and today 6/5/25. No additional outreach attempts scheduled at this time. RD will continue to follow/assist with patient return call.       Kristie Tran RDN, LD  [phone]

## 2025-06-10 ENCOUNTER — CARE COORDINATION (OUTPATIENT)
Dept: CARE COORDINATION | Age: 65
End: 2025-06-10

## 2025-06-10 NOTE — CARE COORDINATION
Care Transitions Note    Final Call     Attempted to reach patient for transitions of care follow up.  Unable to reach patient.      Outreach Attempts:   HIPAA compliant voicemail left for patient.     Patient graduated from the Care Transitions program on 6/10/25.  Patient/family has the ability to self-manage at this time..      Handoff:   Patient was not referred to the ACM team due to no additional needs identified.       Care Summary Note:   -CTN noted in EMR Toprol and Amiodarone refills were placed on 6/5/25.  -CTN to sign off as care transition program ends tomorrow..     Assessments:  Unable to complete as was unable to reach patient.    Upcoming Appointments:    Future Appointments         Provider Specialty Dept Phone    6/20/2025 9:15 AM [facility] CHF ROOM 2 Cardiology [phone]    8/18/2025 9:00 AM Lg Figueroa, DO Primary Care [phone]            Elise Luque RN

## 2025-06-20 ENCOUNTER — RESULTS FOLLOW-UP (OUTPATIENT)
Age: 65
End: 2025-06-20

## 2025-06-20 ENCOUNTER — HOSPITAL ENCOUNTER (OUTPATIENT)
Dept: OTHER | Age: 65
Setting detail: THERAPIES SERIES
Discharge: HOME | End: 2025-06-20
Payer: MEDICARE

## 2025-06-20 VITALS
HEART RATE: 80 BPM | WEIGHT: 174 LBS | SYSTOLIC BLOOD PRESSURE: 106 MMHG | OXYGEN SATURATION: 97 % | RESPIRATION RATE: 18 BRPM | DIASTOLIC BLOOD PRESSURE: 58 MMHG | BODY MASS INDEX: 30.34 KG/M2

## 2025-06-20 LAB
ANION GAP SERPL CALCULATED.3IONS-SCNC: 11 MMOL/L (ref 7–16)
BNP SERPL-MCNC: 577 PG/ML (ref 0–125)
BUN SERPL-MCNC: 23 MG/DL (ref 8–23)
CALCIUM SERPL-MCNC: 10.4 MG/DL (ref 8.8–10.2)
CHLORIDE SERPL-SCNC: 99 MMOL/L (ref 98–107)
CO2 SERPL-SCNC: 27 MMOL/L (ref 22–29)
CREAT SERPL-MCNC: 1.3 MG/DL (ref 0.5–1)
GFR, ESTIMATED: 48 ML/MIN/1.73M2
GLUCOSE SERPL-MCNC: 119 MG/DL (ref 74–99)
POTASSIUM SERPL-SCNC: 4.2 MMOL/L (ref 3.5–5.1)
SODIUM SERPL-SCNC: 136 MMOL/L (ref 136–145)

## 2025-06-20 PROCEDURE — 83880 ASSAY OF NATRIURETIC PEPTIDE: CPT

## 2025-06-20 PROCEDURE — 99204 OFFICE O/P NEW MOD 45 MIN: CPT

## 2025-06-20 PROCEDURE — 80048 BASIC METABOLIC PNL TOTAL CA: CPT

## 2025-06-20 RX ORDER — FUROSEMIDE 20 MG/1
20 TABLET ORAL
Qty: 36 TABLET | Refills: 1 | Status: SHIPPED | OUTPATIENT
Start: 2025-06-20

## 2025-06-20 NOTE — PROGRESS NOTES
Congestive Heart Failure Clinic   [facility]      Referring Provider: -  Primary Care Physician: Lg Figueroa DO   Cardiologist: Dr. Brady  Nephrologist: -      HISTORY OF PRESENT ILLNESS:     Roma iDas is a 64 y.o. (1960) female with a history of HFrEF (EF < 40%)  Pre Cupid:   No results found for: \"LVEF\"  Post Cupid:    Lab Results   Component Value Date    EFBP 40 (A) 05/02/2025         She presents to the CHF clinic for ongoing evaluation and monitoring of heart failure.    In the CHF clinic today she denies any adverse symptoms except:  [] Dizziness or lightheadedness   [] Syncope or near syncope  [] Recent Fall  [] Shortness of breath at rest   [] Dyspnea with exertion  [] Decline in functional capacity (unable to perform activities they had previously been able to do)  [] Fatigue   [] Orthopnea  [] PND  [] Nocturnal cough  [] Hemoptysis  [] Chest pain, pressure, or discomfort  [] Palpitations  [] Abdominal distention  [] Abdominal bloating  [] Early satiety  [] Blood in stool   [] Diarrhea  [] Constipation  [] Nausea/Vomiting  [] Decreased urinary response to oral diuretic   [] Scrotal swelling   [] Lower extremity edema  [] Used PRN doses of oral diuretic   [] Weight gain    Wt Readings from Last 3 Encounters:   06/20/25 78.9 kg (174 lb)   06/02/25 78.9 kg (174 lb)   05/20/25 80.7 kg (178 lb)           SOCIAL HISTORY:  [x] Denies tobacco, alcohol or illicit drug abuse  [] Tobacco use:  [] ETOH use:  [] Illicit drug use:        MEDICATIONS:    Allergies   Allergen Reactions    Biaxin [Clarithromycin] Hallucinations     psychosis    Levofloxacin Other (See Comments)     Patient unsure reaction     Prior to Visit Medications    Medication Sig Taking? Authorizing Provider   amiodarone (CORDARONE) 200 MG tablet Take 1 tablet by mouth daily Yes Aletha Lujan, APRN - CNS   metoprolol succinate (TOPROL XL) 50 MG extended release

## 2025-06-20 NOTE — PLAN OF CARE
Problem: Chronic Conditions and Co-morbidities  Goal: Patient's chronic conditions and co-morbidity symptoms are monitored and maintained or improved  Flowsheets (Taken 6/20/2025 1008)  Care Plan - Patient's Chronic Conditions and Co-Morbidity Symptoms are Monitored and Maintained or Improved: Monitor and assess patient's chronic conditions and comorbid symptoms for stability, deterioration, or improvement

## 2025-06-20 NOTE — RESULT ENCOUNTER NOTE
Labs and CHF Clinic note reviewed    HFrEF, LVEF 40% per TTE 5/2/2025    Euvolemic on exam in clinic today.  Presents for initial CHF clinic evaluation.  Has no complaints.  Weight 174 pounds.    Wt Readings from Last 3 Encounters:  06/20/25 : 78.9 kg (174 lb)  06/02/25 : 78.9 kg (174 lb)  05/20/25 : 80.7 kg (178 lb)    BP Readings from Last 1 Encounters:  06/20/25 : (!) 106/58    Pulse Readings from Last 1 Encounters:  06/20/25 : 80      Current GDMT:  Jardiance 10 mg p.o. daily  Lasix 20 mg p.o. daily  Toprol-XL 50 mg p.o. twice daily  Entresto 24/26 mg tablet-half tablet p.o. twice daily  Aldactone 12.5 mg p.o. daily    BMP shows mild worsening renal function compared to prior with creatinine 1.3, BUN of 23 and GFR low at 48 compared to 0.8, 9 and 85 respectively on 5/8/2025.  Potassium stable at 4.2.  NT proBNP 577 compared to 1412 on 5/8/2025 and 5909 on 5/5/2025    1.  Decrease Lasix to 20 mg 3 times weekly on Monday/Wednesday/Friday (instead of daily) due to mild worsening in renal function  2.  Ensure patient staying hydrated with 64 ounces of fluid daily  3.  Follow-up in CHF clinic on 7/14/2025 as scheduled or sooner if needed

## 2025-07-02 RX ORDER — METOPROLOL SUCCINATE 50 MG/1
50 TABLET, EXTENDED RELEASE ORAL 2 TIMES DAILY
Qty: 60 TABLET | Refills: 6 | Status: SHIPPED | OUTPATIENT
Start: 2025-07-02

## 2025-07-03 ENCOUNTER — TELEPHONE (OUTPATIENT)
Age: 65
End: 2025-07-03

## 2025-07-14 ENCOUNTER — HOSPITAL ENCOUNTER (OUTPATIENT)
Dept: OTHER | Age: 65
Setting detail: THERAPIES SERIES
Discharge: HOME | End: 2025-07-14
Payer: MEDICARE

## 2025-07-14 VITALS
HEART RATE: 67 BPM | OXYGEN SATURATION: 98 % | RESPIRATION RATE: 18 BRPM | DIASTOLIC BLOOD PRESSURE: 60 MMHG | WEIGHT: 177 LBS | BODY MASS INDEX: 30.86 KG/M2 | SYSTOLIC BLOOD PRESSURE: 103 MMHG

## 2025-07-14 LAB
ANION GAP SERPL CALCULATED.3IONS-SCNC: 11 MMOL/L (ref 7–16)
BNP SERPL-MCNC: 672 PG/ML (ref 0–125)
BUN SERPL-MCNC: 15 MG/DL (ref 8–23)
CALCIUM SERPL-MCNC: 9.4 MG/DL (ref 8.8–10.2)
CHLORIDE SERPL-SCNC: 103 MMOL/L (ref 98–107)
CO2 SERPL-SCNC: 25 MMOL/L (ref 22–29)
CREAT SERPL-MCNC: 0.9 MG/DL (ref 0.5–1)
GFR, ESTIMATED: 68 ML/MIN/1.73M2
GLUCOSE SERPL-MCNC: 91 MG/DL (ref 74–99)
POTASSIUM SERPL-SCNC: 3.8 MMOL/L (ref 3.5–5.1)
SODIUM SERPL-SCNC: 139 MMOL/L (ref 136–145)

## 2025-07-14 PROCEDURE — 99214 OFFICE O/P EST MOD 30 MIN: CPT

## 2025-07-14 PROCEDURE — 80048 BASIC METABOLIC PNL TOTAL CA: CPT

## 2025-07-14 PROCEDURE — 83880 ASSAY OF NATRIURETIC PEPTIDE: CPT

## 2025-07-14 NOTE — PROGRESS NOTES
Congestive Heart Failure Clinic   [facility]      Referring Provider: -  Primary Care Physician: Lg Figueroa DO   Cardiologist: Dr. Brady  Nephrologist: -      HISTORY OF PRESENT ILLNESS:     Roma Dias is a 64 y.o. (1960) female with a history of HFrEF (EF < 40%)  Pre Cupid:   No results found for: \"LVEF\"  Post Cupid:    Lab Results   Component Value Date    EFBP 40 (A) 05/02/2025         She presents to the CHF clinic for ongoing evaluation and monitoring of heart failure.    In the CHF clinic today she denies any adverse symptoms except:  [] Dizziness or lightheadedness   [] Syncope or near syncope  [] Recent Fall  [] Shortness of breath at rest   [] Dyspnea with exertion  [] Decline in functional capacity (unable to perform activities they had previously been able to do)  [] Fatigue   [] Orthopnea  [] PND  [] Nocturnal cough  [] Hemoptysis  [] Chest pain, pressure, or discomfort  [] Palpitations  [] Abdominal distention  [] Abdominal bloating  [] Early satiety  [] Blood in stool   [] Diarrhea  [] Constipation  [] Nausea/Vomiting  [] Decreased urinary response to oral diuretic   [] Scrotal swelling   [] Lower extremity edema  [] Used PRN doses of oral diuretic   [] Weight gain    Wt Readings from Last 3 Encounters:   07/14/25 80.3 kg (177 lb)   06/20/25 78.9 kg (174 lb)   06/02/25 78.9 kg (174 lb)       SOCIAL HISTORY:  [x] Denies tobacco, alcohol or illicit drug abuse  [] Tobacco use:  [] ETOH use:  [] Illicit drug use:        MEDICATIONS:    Allergies   Allergen Reactions    Biaxin [Clarithromycin] Hallucinations     psychosis    Levofloxacin Other (See Comments)     Patient unsure reaction     Prior to Visit Medications    Medication Sig Taking? Authorizing Provider   sacubitril-valsartan (ENTRESTO) 24-26 MG per tablet Take 0.5 tablets by mouth 2 times daily Yes Katina Iverson MD   metoprolol succinate (TOPROL XL) 50 MG extended

## 2025-07-14 NOTE — PLAN OF CARE
Problem: Chronic Conditions and Co-morbidities  Goal: Patient's chronic conditions and co-morbidity symptoms are monitored and maintained or improved  Flowsheets (Taken 7/14/2025 1231)  Care Plan - Patient's Chronic Conditions and Co-Morbidity Symptoms are Monitored and Maintained or Improved: Monitor and assess patient's chronic conditions and comorbid symptoms for stability, deterioration, or improvement

## 2025-07-15 RX ORDER — AMIODARONE HYDROCHLORIDE 200 MG/1
200 TABLET ORAL DAILY
Qty: 30 TABLET | Refills: 3 | Status: SHIPPED | OUTPATIENT
Start: 2025-07-15

## 2025-07-22 ENCOUNTER — TELEPHONE (OUTPATIENT)
Dept: OTHER | Age: 65
End: 2025-07-22

## 2025-07-22 NOTE — TELEPHONE ENCOUNTER
Called patient to discuss medication changes.    Yana Hennessy APRN - Yanni Ga RN  Labs and CHF clinic note reviewed  Increase Entresto full 24/26 mg BID tablet  Decrease lasix to PRN  Follow up in in CHF clinic as scheduled    Patient expressed verbal understanding and repeated orders back correctly. Encouraged to call the clinic back with any questions.

## 2025-08-04 ENCOUNTER — HOSPITAL ENCOUNTER (OUTPATIENT)
Dept: OTHER | Age: 65
Setting detail: THERAPIES SERIES
Discharge: HOME | End: 2025-08-04
Payer: MEDICARE

## 2025-08-04 VITALS
SYSTOLIC BLOOD PRESSURE: 109 MMHG | DIASTOLIC BLOOD PRESSURE: 59 MMHG | WEIGHT: 179 LBS | HEART RATE: 76 BPM | OXYGEN SATURATION: 97 % | RESPIRATION RATE: 18 BRPM | BODY MASS INDEX: 31.21 KG/M2

## 2025-08-04 DIAGNOSIS — I50.22 HEART FAILURE WITH MID-RANGE EJECTION FRACTION (HFMEF) (HCC): Primary | ICD-10-CM

## 2025-08-04 LAB
ANION GAP SERPL CALCULATED.3IONS-SCNC: 10 MMOL/L (ref 7–16)
BNP SERPL-MCNC: 654 PG/ML (ref 0–125)
BUN SERPL-MCNC: 24 MG/DL (ref 8–23)
CALCIUM SERPL-MCNC: 10 MG/DL (ref 8.8–10.2)
CHLORIDE SERPL-SCNC: 106 MMOL/L (ref 98–107)
CO2 SERPL-SCNC: 26 MMOL/L (ref 22–29)
CREAT SERPL-MCNC: 1 MG/DL (ref 0.5–1)
GFR, ESTIMATED: 61 ML/MIN/1.73M2
GLUCOSE SERPL-MCNC: 106 MG/DL (ref 74–99)
POTASSIUM SERPL-SCNC: 5.5 MMOL/L (ref 3.5–5.1)
SODIUM SERPL-SCNC: 142 MMOL/L (ref 136–145)

## 2025-08-04 PROCEDURE — 99214 OFFICE O/P EST MOD 30 MIN: CPT

## 2025-08-04 PROCEDURE — 80048 BASIC METABOLIC PNL TOTAL CA: CPT

## 2025-08-04 PROCEDURE — 83880 ASSAY OF NATRIURETIC PEPTIDE: CPT

## 2025-08-11 ENCOUNTER — OFFICE VISIT (OUTPATIENT)
Dept: CARDIOLOGY CLINIC | Age: 65
End: 2025-08-11
Payer: MEDICARE

## 2025-08-11 VITALS
DIASTOLIC BLOOD PRESSURE: 80 MMHG | TEMPERATURE: 97 F | OXYGEN SATURATION: 96 % | RESPIRATION RATE: 18 BRPM | WEIGHT: 181.7 LBS | BODY MASS INDEX: 32.2 KG/M2 | HEART RATE: 62 BPM | SYSTOLIC BLOOD PRESSURE: 124 MMHG | HEIGHT: 63 IN

## 2025-08-11 DIAGNOSIS — I48.0 PAF (PAROXYSMAL ATRIAL FIBRILLATION) (HCC): Primary | ICD-10-CM

## 2025-08-11 DIAGNOSIS — I42.9 CARDIOMYOPATHY, UNSPECIFIED TYPE (HCC): ICD-10-CM

## 2025-08-11 DIAGNOSIS — I50.22 HEART FAILURE WITH MID-RANGE EJECTION FRACTION (HFMEF) (HCC): ICD-10-CM

## 2025-08-11 PROCEDURE — 1123F ACP DISCUSS/DSCN MKR DOCD: CPT | Performed by: INTERNAL MEDICINE

## 2025-08-11 PROCEDURE — 1159F MED LIST DOCD IN RCRD: CPT | Performed by: INTERNAL MEDICINE

## 2025-08-11 PROCEDURE — 3074F SYST BP LT 130 MM HG: CPT | Performed by: INTERNAL MEDICINE

## 2025-08-11 PROCEDURE — 3079F DIAST BP 80-89 MM HG: CPT | Performed by: INTERNAL MEDICINE

## 2025-08-11 PROCEDURE — G2211 COMPLEX E/M VISIT ADD ON: HCPCS | Performed by: INTERNAL MEDICINE

## 2025-08-11 PROCEDURE — G8417 CALC BMI ABV UP PARAM F/U: HCPCS | Performed by: INTERNAL MEDICINE

## 2025-08-11 PROCEDURE — G8400 PT W/DXA NO RESULTS DOC: HCPCS | Performed by: INTERNAL MEDICINE

## 2025-08-11 PROCEDURE — 99214 OFFICE O/P EST MOD 30 MIN: CPT | Performed by: INTERNAL MEDICINE

## 2025-08-11 PROCEDURE — 93000 ELECTROCARDIOGRAM COMPLETE: CPT | Performed by: INTERNAL MEDICINE

## 2025-08-11 PROCEDURE — 1090F PRES/ABSN URINE INCON ASSESS: CPT | Performed by: INTERNAL MEDICINE

## 2025-08-11 PROCEDURE — G8427 DOCREV CUR MEDS BY ELIG CLIN: HCPCS | Performed by: INTERNAL MEDICINE

## 2025-08-11 PROCEDURE — 4004F PT TOBACCO SCREEN RCVD TLK: CPT | Performed by: INTERNAL MEDICINE

## 2025-08-11 PROCEDURE — 3017F COLORECTAL CA SCREEN DOC REV: CPT | Performed by: INTERNAL MEDICINE

## 2025-08-12 ENCOUNTER — TELEPHONE (OUTPATIENT)
Dept: CARDIOLOGY | Age: 65
End: 2025-08-12

## 2025-08-18 ENCOUNTER — OFFICE VISIT (OUTPATIENT)
Dept: PRIMARY CARE CLINIC | Age: 65
End: 2025-08-18

## 2025-08-18 VITALS
HEIGHT: 63 IN | OXYGEN SATURATION: 97 % | SYSTOLIC BLOOD PRESSURE: 108 MMHG | HEART RATE: 68 BPM | WEIGHT: 184 LBS | DIASTOLIC BLOOD PRESSURE: 64 MMHG | TEMPERATURE: 95.9 F | BODY MASS INDEX: 32.6 KG/M2

## 2025-08-18 DIAGNOSIS — Z78.0 ASYMPTOMATIC MENOPAUSAL STATE: ICD-10-CM

## 2025-08-18 DIAGNOSIS — Z11.59 NEED FOR HEPATITIS C SCREENING TEST: ICD-10-CM

## 2025-08-18 DIAGNOSIS — E78.00 HYPERCHOLESTEROLEMIA: Primary | ICD-10-CM

## 2025-08-18 DIAGNOSIS — E03.8 OTHER SPECIFIED HYPOTHYROIDISM: ICD-10-CM

## 2025-08-18 DIAGNOSIS — E79.0 HYPERURICEMIA: ICD-10-CM

## 2025-08-18 DIAGNOSIS — R73.01 IFG (IMPAIRED FASTING GLUCOSE): ICD-10-CM

## 2025-08-25 ENCOUNTER — HOSPITAL ENCOUNTER (OUTPATIENT)
Dept: OTHER | Age: 65
Setting detail: THERAPIES SERIES
Discharge: HOME | End: 2025-08-25
Payer: MEDICARE

## 2025-08-25 VITALS
RESPIRATION RATE: 18 BRPM | WEIGHT: 180 LBS | SYSTOLIC BLOOD PRESSURE: 130 MMHG | DIASTOLIC BLOOD PRESSURE: 60 MMHG | HEART RATE: 68 BPM | OXYGEN SATURATION: 99 % | BODY MASS INDEX: 31.89 KG/M2

## 2025-08-25 LAB
ANION GAP SERPL CALCULATED.3IONS-SCNC: 12 MMOL/L (ref 7–16)
BNP SERPL-MCNC: 709 PG/ML (ref 0–125)
BUN SERPL-MCNC: 16 MG/DL (ref 8–23)
CALCIUM SERPL-MCNC: 9.6 MG/DL (ref 8.8–10.2)
CHLORIDE SERPL-SCNC: 103 MMOL/L (ref 98–107)
CO2 SERPL-SCNC: 26 MMOL/L (ref 22–29)
CREAT SERPL-MCNC: 0.9 MG/DL (ref 0.5–1)
GFR, ESTIMATED: 68 ML/MIN/1.73M2
GLUCOSE SERPL-MCNC: 89 MG/DL (ref 74–99)
POTASSIUM SERPL-SCNC: 3.8 MMOL/L (ref 3.5–5.1)
SODIUM SERPL-SCNC: 141 MMOL/L (ref 136–145)

## 2025-08-25 PROCEDURE — 80048 BASIC METABOLIC PNL TOTAL CA: CPT

## 2025-08-25 PROCEDURE — 83880 ASSAY OF NATRIURETIC PEPTIDE: CPT

## 2025-08-25 PROCEDURE — 99214 OFFICE O/P EST MOD 30 MIN: CPT

## 2025-08-26 ENCOUNTER — HOSPITAL ENCOUNTER (EMERGENCY)
Age: 65
Discharge: HOME | End: 2025-08-26
Attending: EMERGENCY MEDICINE
Payer: MEDICARE

## 2025-08-26 ENCOUNTER — APPOINTMENT (OUTPATIENT)
Dept: CT IMAGING | Age: 65
End: 2025-08-26
Payer: MEDICARE

## 2025-08-26 VITALS
RESPIRATION RATE: 16 BRPM | HEIGHT: 63 IN | WEIGHT: 180 LBS | HEART RATE: 61 BPM | BODY MASS INDEX: 31.89 KG/M2 | DIASTOLIC BLOOD PRESSURE: 77 MMHG | OXYGEN SATURATION: 97 % | TEMPERATURE: 98.3 F | SYSTOLIC BLOOD PRESSURE: 138 MMHG

## 2025-08-26 DIAGNOSIS — R31.0 GROSS HEMATURIA: ICD-10-CM

## 2025-08-26 DIAGNOSIS — N20.0 KIDNEY STONE: Primary | ICD-10-CM

## 2025-08-26 LAB
ANION GAP SERPL CALCULATED.3IONS-SCNC: 12 MMOL/L (ref 7–16)
BILIRUB UR QL STRIP: NEGATIVE
BUN SERPL-MCNC: 17 MG/DL (ref 8–23)
CALCIUM SERPL-MCNC: 9.5 MG/DL (ref 8.8–10.2)
CHLORIDE SERPL-SCNC: 104 MMOL/L (ref 98–107)
CLARITY UR: ABNORMAL
CO2 SERPL-SCNC: 27 MMOL/L (ref 22–29)
COLOR UR: ABNORMAL
COMMENT: ABNORMAL
CREAT SERPL-MCNC: 0.8 MG/DL (ref 0.5–1)
EPI CELLS #/AREA URNS HPF: ABNORMAL /HPF
ERYTHROCYTE [DISTWIDTH] IN BLOOD BY AUTOMATED COUNT: 15.3 % (ref 11.5–15)
GFR, ESTIMATED: 81 ML/MIN/1.73M2
GLUCOSE SERPL-MCNC: 76 MG/DL (ref 74–99)
GLUCOSE UR STRIP-MCNC: >=1000 MG/DL
HCT VFR BLD AUTO: 42.3 % (ref 34–48)
HGB BLD-MCNC: 13.7 G/DL (ref 11.5–15.5)
HGB UR QL STRIP.AUTO: ABNORMAL
KETONES UR STRIP-MCNC: NEGATIVE MG/DL
LACTATE BLDV-SCNC: 0.7 MMOL/L (ref 0.5–2.2)
LEUKOCYTE ESTERASE UR QL STRIP: NEGATIVE
MCH RBC QN AUTO: 29.9 PG (ref 26–35)
MCHC RBC AUTO-ENTMCNC: 32.4 G/DL (ref 32–34.5)
MCV RBC AUTO: 92.4 FL (ref 80–99.9)
NITRITE UR QL STRIP: NEGATIVE
PH UR STRIP: 7 [PH] (ref 5–8)
PLATELET # BLD AUTO: 58 K/UL (ref 130–450)
PLATELET CONFIRMATION: NORMAL
PMV BLD AUTO: ABNORMAL FL (ref 7–12)
POTASSIUM SERPL-SCNC: 3.7 MMOL/L (ref 3.5–5.1)
PROT UR STRIP-MCNC: 30 MG/DL
RBC # BLD AUTO: 4.58 M/UL (ref 3.5–5.5)
RBC #/AREA URNS HPF: ABNORMAL /HPF
SODIUM SERPL-SCNC: 142 MMOL/L (ref 136–145)
SP GR UR STRIP: 1.01 (ref 1–1.03)
UROBILINOGEN UR STRIP-ACNC: 0.2 EU/DL (ref 0–1)
WBC #/AREA URNS HPF: ABNORMAL /HPF
WBC OTHER # BLD: 5.1 K/UL (ref 4.5–11.5)

## 2025-08-26 PROCEDURE — 87086 URINE CULTURE/COLONY COUNT: CPT

## 2025-08-26 PROCEDURE — 2580000003 HC RX 258: Performed by: EMERGENCY MEDICINE

## 2025-08-26 PROCEDURE — 80048 BASIC METABOLIC PNL TOTAL CA: CPT

## 2025-08-26 PROCEDURE — 85027 COMPLETE CBC AUTOMATED: CPT

## 2025-08-26 PROCEDURE — 83605 ASSAY OF LACTIC ACID: CPT

## 2025-08-26 PROCEDURE — 74176 CT ABD & PELVIS W/O CONTRAST: CPT

## 2025-08-26 PROCEDURE — 99284 EMERGENCY DEPT VISIT MOD MDM: CPT

## 2025-08-26 PROCEDURE — 81001 URINALYSIS AUTO W/SCOPE: CPT

## 2025-08-26 RX ORDER — HYDROCODONE BITARTRATE AND ACETAMINOPHEN 5; 325 MG/1; MG/1
1 TABLET ORAL EVERY 6 HOURS PRN
Qty: 10 TABLET | Refills: 0 | Status: SHIPPED | OUTPATIENT
Start: 2025-08-26 | End: 2025-08-29

## 2025-08-26 RX ORDER — ONDANSETRON 4 MG/1
4 TABLET, ORALLY DISINTEGRATING ORAL 3 TIMES DAILY PRN
Qty: 30 TABLET | Refills: 0 | Status: SHIPPED | OUTPATIENT
Start: 2025-08-26

## 2025-08-26 RX ORDER — 0.9 % SODIUM CHLORIDE 0.9 %
1000 INTRAVENOUS SOLUTION INTRAVENOUS ONCE
Status: COMPLETED | OUTPATIENT
Start: 2025-08-26 | End: 2025-08-26

## 2025-08-26 RX ORDER — TAMSULOSIN HYDROCHLORIDE 0.4 MG/1
0.4 CAPSULE ORAL DAILY
Qty: 90 CAPSULE | Refills: 1 | Status: SHIPPED | OUTPATIENT
Start: 2025-08-26

## 2025-08-26 RX ADMIN — SODIUM CHLORIDE 1000 ML: 9 INJECTION, SOLUTION INTRAVENOUS at 17:25

## 2025-08-26 ASSESSMENT — PAIN - FUNCTIONAL ASSESSMENT: PAIN_FUNCTIONAL_ASSESSMENT: 0-10

## 2025-08-26 ASSESSMENT — PAIN DESCRIPTION - LOCATION: LOCATION: ABDOMEN

## 2025-08-26 ASSESSMENT — PAIN DESCRIPTION - ORIENTATION: ORIENTATION: LOWER;MID

## 2025-08-26 ASSESSMENT — PAIN DESCRIPTION - DESCRIPTORS: DESCRIPTORS: DISCOMFORT;CRAMPING

## 2025-08-26 ASSESSMENT — PAIN SCALES - GENERAL: PAINLEVEL_OUTOF10: 6

## 2025-08-27 ENCOUNTER — TELEPHONE (OUTPATIENT)
Dept: CARDIOLOGY | Age: 65
End: 2025-08-27

## 2025-08-28 LAB
MICROORGANISM SPEC CULT: ABNORMAL
MICROORGANISM SPEC CULT: ABNORMAL
SPECIMEN DESCRIPTION: ABNORMAL

## (undated) DEVICE — 18 GA N.G. KIT, 10 PACK: Brand: SITE-RITE

## (undated) DEVICE — CANNULA NSL CANN NSL L25FT TBNG AD O2 SUP SFT UC

## (undated) DEVICE — GUIDEWIRE VASC J 0.035 INX300 CM INTERMED SHAPEABLE TIP WHLY

## (undated) DEVICE — SYRINGE MED 10ML LUERLOCK TIP W/O SFTY DISP

## (undated) DEVICE — DRAPE SHEET: Brand: UNBRANDED

## (undated) DEVICE — SOLUTION SURG PREP ANTIMICROBIAL 4 OZ SKIN WND EXIDINE

## (undated) DEVICE — STRAP POS MP 30X3 IN HK LOOP CLOSURE FOAM DISP

## (undated) DEVICE — GAUZE,SPONGE,4"X4",8PLY,STRL,LF,10/TRAY: Brand: MEDLINE

## (undated) DEVICE — 5F (1.0MM ID) X 9CM STIFF5F (1.0 MICRO-STICK®INTRODUCER SE WITH NITINOL GUIDEWIREWITH NITIN WITH RADIOPAQUE TIPWITH RADIOPAQ: Brand: MICRO-STICK SETMICRO-STICK SET

## (undated) DEVICE — GLIDESHEATH SLENDER ACCESS KIT: Brand: GLIDESHEATH SLENDER

## (undated) DEVICE — CATH URETERAL 5FR FLEX TIP OPEN END 70CM

## (undated) DEVICE — Device

## (undated) DEVICE — SYRINGE MED 5ML STD CLR PLAS LUERLOCK TIP N CTRL DISP

## (undated) DEVICE — KIT ANGIO W/ AT P65 PREM HND CTRL FOR CNTRST DEL ANGIOTOUCH

## (undated) DEVICE — TUBING, SUCTION, 3/16" X 12', STRAIGHT: Brand: MEDLINE

## (undated) DEVICE — CYSTO PACK: Brand: MEDLINE INDUSTRIES, INC.

## (undated) DEVICE — GOWN,SIRUS,FABRNF,2XL,18/CS: Brand: MEDLINE

## (undated) DEVICE — RADIFOCUS OPTITORQUE ANGIOGRAPHIC CATHETER: Brand: OPTITORQUE

## (undated) DEVICE — SYRINGE MED 30ML STD CLR PLAS LUERLOCK TIP N CTRL DISP

## (undated) DEVICE — BAG DRNGE COMB PK

## (undated) DEVICE — SYRINGE 20ML LL S/C 50

## (undated) DEVICE — ELECTRODE PT RET AD L9FT HI MOIST COND ADH HYDRGEL CORDED

## (undated) DEVICE — GUIDEWIRE VASC L260CM 0.035IN J TIP L3MM PTFE FIX COR NAMIC

## (undated) DEVICE — SYRINGE MEDICAL 3ML CLEAR PLASTIC STANDARD NON CONTROL LUERLOCK TIP DISPOSABLE

## (undated) DEVICE — BAND COMPR L24CM REG CLR PLAS HEMSTAT EXT HK AND LOOP RETEN

## (undated) DEVICE — DRAPE EQUIP BANDED BG 36X28 IN W/ROUNDED CORNER SNAPKOVER

## (undated) DEVICE — MINOR VASCULAR: Brand: MEDLINE INDUSTRIES, INC.

## (undated) DEVICE — SOLUTION IRRIG 3000ML 0.9% SOD CHL USP UROMATIC PLAS CONT

## (undated) DEVICE — 3M™ IOBAN™ 2 ANTIMICROBIAL INCISE DRAPE 6640EZ: Brand: IOBAN™ 2

## (undated) DEVICE — SOLUTION IRRIG 3000ML STRL H2O USP UROMATIC PLAS CONT

## (undated) DEVICE — GUIDEWIRE ENDOSCP L150CM DIA0.035IN TIP 3CM PTFE NIT

## (undated) DEVICE — KIT MFLD ISOLATN NACL CNTRST PRT TBNG SPIK W/ PRSS TRNSDUC

## (undated) DEVICE — 4-PORT MANIFOLD: Brand: NEPTUNE 2

## (undated) DEVICE — MEDICINE CUP, GRADUATED, STER: Brand: MEDLINE

## (undated) DEVICE — ANGIOGRAPHIC CATHETER: Brand: EXPO™

## (undated) DEVICE — PAD, DEFIB, ADULT, RADIOTRAN, PHYSIO, LO: Brand: MEDLINE

## (undated) DEVICE — SOLUTION IRRIG 500ML 0.9% SOD CHLO USP POUR PLAS BTL

## (undated) DEVICE — GLOVE ORANGE PI 8   MSG9080